# Patient Record
Sex: FEMALE | Race: WHITE | NOT HISPANIC OR LATINO | URBAN - METROPOLITAN AREA
[De-identification: names, ages, dates, MRNs, and addresses within clinical notes are randomized per-mention and may not be internally consistent; named-entity substitution may affect disease eponyms.]

---

## 2017-02-14 ENCOUNTER — EMERGENCY (EMERGENCY)
Facility: HOSPITAL | Age: 82
LOS: 1 days | Discharge: HOME | End: 2017-02-14
Attending: EMERGENCY MEDICINE | Admitting: EMERGENCY MEDICINE
Payer: MEDICARE

## 2017-02-14 VITALS
OXYGEN SATURATION: 100 % | TEMPERATURE: 97 F | HEART RATE: 88 BPM | DIASTOLIC BLOOD PRESSURE: 57 MMHG | SYSTOLIC BLOOD PRESSURE: 138 MMHG | RESPIRATION RATE: 20 BRPM

## 2017-02-14 VITALS
HEART RATE: 82 BPM | DIASTOLIC BLOOD PRESSURE: 46 MMHG | OXYGEN SATURATION: 84 % | SYSTOLIC BLOOD PRESSURE: 159 MMHG | RESPIRATION RATE: 18 BRPM

## 2017-02-14 DIAGNOSIS — Z96.643 PRESENCE OF ARTIFICIAL HIP JOINT, BILATERAL: Chronic | ICD-10-CM

## 2017-02-14 DIAGNOSIS — Z98.89 OTHER SPECIFIED POSTPROCEDURAL STATES: Chronic | ICD-10-CM

## 2017-02-14 PROCEDURE — 99284 EMERGENCY DEPT VISIT MOD MDM: CPT | Mod: 25,GC

## 2017-02-14 PROCEDURE — 93010 ELECTROCARDIOGRAM REPORT: CPT

## 2017-02-14 NOTE — PROVIDER CONTACT NOTE (OTHER) - ASSESSMENT
Pt stable to return home via ambulance.  [facility] to transport patient at 10-10:30.  daughter aware of transport time.

## 2017-02-14 NOTE — ED ADULT NURSE NOTE - OBJECTIVE STATEMENT
Received pt. in rm 2, A&Ox   , c/o generalized body pain (more so on left side) s/p slip and fall out of bed today. Daughter at bedside states pt. didn't have LOC or head trauma. Pt. non-ambulatory at baseline; h/o chronic pain. EKG performed; MD at bedside for eval. Pending xrays. Will continue to monitor. Received pt. in rm 2, A&O to baseline  , c/o generalized body pain (more so on left side) s/p slip and fall out of bed today. Daughter at bedside states pt. didn't have LOC or head trauma. Pt. non-ambulatory at baseline; h/o chronic pain. EKG performed; MD at bedside for eval. Daughter refuses xrays at this time. Will continue to monitor.

## 2017-02-14 NOTE — ED PROVIDER NOTE - PMH
Anxiety    Arthritis    Atrial fibrillation and flutter    Benign essential HTN    CKD (chronic kidney disease)    COPD (chronic obstructive pulmonary disease)    HLD (hyperlipidemia)    Obese    Pruritus    Senile dementia with depression    Symptomatic bradycardia

## 2017-02-14 NOTE — ED ADULT TRIAGE NOTE - CHIEF COMPLAINT QUOTE
Pt. brought directly to tx/ area by EMS. Witness fall by daughter at home. Pt. slid off of bed. A & O x 1. NAD noted. Daughter says that patient was c/o left leg pain. Pt. is not c/o pain at present. NAD noted. Pt. brought directly to tx/ area by EMS. Witness fall by daughter at home. Pt. slid off of bed. A & O x 1. NAD noted. Daughter says that patient was c/o left leg pain. Pt. is not c/o pain at present. NAD noted. Denies LOC or head trauma.

## 2017-02-14 NOTE — ED ADULT NURSE NOTE - CHIEF COMPLAINT QUOTE
Pt. brought directly to tx/ area by EMS. Witness fall by daughter at home. Pt. slid off of bed. A & O x 1. NAD noted. Daughter says that patient was c/o left leg pain. Pt. is not c/o pain at present. NAD noted. Denies LOC or head trauma.

## 2017-02-14 NOTE — ED PROVIDER NOTE - OBJECTIVE STATEMENT
88F PMH afib (not on anticoagulation 2/2 ICH in July 2016), dementia, s/p pacemaker placement for symptomatic bradycardia, CKD, COPD presents after unwitnessed fall at home. Pt's daughter at bedside, states heard pt fall, went into room and found patient on the ground, sitting on her left hip with her back resting up against bed. Pt was previously in bed. Typically does not attempt to get up or ambulate, so attempt to get out of bed was unusual. Pt otherwise at baseline mental status. Pt moaning when attempting to move her, but daughter states this is chronic. Has otherwise been at baseline over the past few days. A+Ox1-2 at baseline, does not ambulate.

## 2017-02-14 NOTE — ED PROVIDER NOTE - ATTENDING CONTRIBUTION TO CARE
88f pmhx severe dementia, chronic pain, htn, dlp, afib with ppm, no a/c. pt non-ambulatory at baseline. lives with daughter. pt tried to get out of bed. daughter heard her fall, found her on the floor. pt was aigtated so she called ems. pt now at baseline. pt chronically has pain wherever you press her. pt was at baseline prior. h/o b/l MANDA. exam, hr 52, other vs wnl, nad. hs and lungs normal, abdo benign. hip not dislocated on exam. I had a long d/w with daughter. she said pt is at baseline now and she would not have brought pt to ED if she was in this state. daughter declines xrays because she would not do surgery. I told her that if fx, longer course of pain but daughter said pt non-mbulatory anyways so doesn't want xrays. pulse 52. I expressed concern over ppm malfunction. daughter daid she was avi last time too and ppm interrogated and was fine. she has a cards f/u appt coming up. declined ppm interrogation in ED. wants to take pt home. will d/c.

## 2017-02-14 NOTE — ED ADULT NURSE NOTE - PSH
History of back surgery  x3, approimately 1980, 1985, 1991  Status post bilateral total hip replacement  several years ago  Status post placement of implantable loop recorder  9/15

## 2017-02-14 NOTE — ED PROVIDER NOTE - MEDICAL DECISION MAKING DETAILS
88F s/p unwitnessed fall at home. 88F s/p unwitnessed fall at home. Pt currently at baseline, per daughter would not have brought her to hospital if was at this baseline prior to calling ambulance. Daughter would not want surgical intervention if fracture present given that pt is nonambulatory at baseline, low suspicion for intracranial process, family would not want any intervention if there was process. Will d/c home per family.

## 2017-02-23 ENCOUNTER — EMERGENCY (EMERGENCY)
Facility: HOSPITAL | Age: 82
LOS: 1 days | Discharge: HOME | End: 2017-02-23
Attending: EMERGENCY MEDICINE | Admitting: EMERGENCY MEDICINE
Payer: MEDICARE

## 2017-02-23 VITALS
RESPIRATION RATE: 18 BRPM | TEMPERATURE: 98 F | HEART RATE: 61 BPM | SYSTOLIC BLOOD PRESSURE: 137 MMHG | DIASTOLIC BLOOD PRESSURE: 69 MMHG | OXYGEN SATURATION: 95 %

## 2017-02-23 VITALS — DIASTOLIC BLOOD PRESSURE: 72 MMHG | SYSTOLIC BLOOD PRESSURE: 139 MMHG | HEART RATE: 70 BPM

## 2017-02-23 DIAGNOSIS — Z98.89 OTHER SPECIFIED POSTPROCEDURAL STATES: Chronic | ICD-10-CM

## 2017-02-23 DIAGNOSIS — Z96.643 PRESENCE OF ARTIFICIAL HIP JOINT, BILATERAL: Chronic | ICD-10-CM

## 2017-02-23 LAB
ALBUMIN SERPL ELPH-MCNC: 3.1 G/DL — LOW (ref 3.3–5)
ALP SERPL-CCNC: 109 U/L — SIGNIFICANT CHANGE UP (ref 40–120)
ALT FLD-CCNC: 9 U/L — SIGNIFICANT CHANGE UP (ref 4–33)
ANISOCYTOSIS BLD QL: SIGNIFICANT CHANGE UP
APTT BLD: 32.7 SEC — SIGNIFICANT CHANGE UP (ref 27.5–37.4)
AST SERPL-CCNC: 12 U/L — SIGNIFICANT CHANGE UP (ref 4–32)
BASOPHILS # BLD AUTO: 0.02 K/UL — SIGNIFICANT CHANGE UP (ref 0–0.2)
BASOPHILS NFR BLD AUTO: 0.2 % — SIGNIFICANT CHANGE UP (ref 0–2)
BASOPHILS NFR SPEC: 1 % — SIGNIFICANT CHANGE UP (ref 0–2)
BILIRUB SERPL-MCNC: 0.8 MG/DL — SIGNIFICANT CHANGE UP (ref 0.2–1.2)
BUN SERPL-MCNC: 22 MG/DL — SIGNIFICANT CHANGE UP (ref 7–23)
CALCIUM SERPL-MCNC: 9.2 MG/DL — SIGNIFICANT CHANGE UP (ref 8.4–10.5)
CHLORIDE SERPL-SCNC: 106 MMOL/L — SIGNIFICANT CHANGE UP (ref 98–107)
CO2 SERPL-SCNC: 21 MMOL/L — LOW (ref 22–31)
CREAT SERPL-MCNC: 0.98 MG/DL — SIGNIFICANT CHANGE UP (ref 0.5–1.3)
EOSINOPHIL # BLD AUTO: 0.63 K/UL — HIGH (ref 0–0.5)
EOSINOPHIL NFR BLD AUTO: 5.9 % — SIGNIFICANT CHANGE UP (ref 0–6)
EOSINOPHIL NFR FLD: 5 % — SIGNIFICANT CHANGE UP (ref 0–6)
GLUCOSE SERPL-MCNC: 102 MG/DL — HIGH (ref 70–99)
HCT VFR BLD CALC: 40.6 % — SIGNIFICANT CHANGE UP (ref 34.5–45)
HGB BLD-MCNC: 12.8 G/DL — SIGNIFICANT CHANGE UP (ref 11.5–15.5)
HYPOCHROMIA BLD QL: SLIGHT — SIGNIFICANT CHANGE UP
IMM GRANULOCYTES NFR BLD AUTO: 1.4 % — SIGNIFICANT CHANGE UP (ref 0–1.5)
INR BLD: 1.03 — SIGNIFICANT CHANGE UP (ref 0.87–1.18)
LYMPHOCYTES # BLD AUTO: 1.86 K/UL — SIGNIFICANT CHANGE UP (ref 1–3.3)
LYMPHOCYTES # BLD AUTO: 17.4 % — SIGNIFICANT CHANGE UP (ref 13–44)
LYMPHOCYTES NFR SPEC AUTO: 17 % — SIGNIFICANT CHANGE UP (ref 13–44)
MANUAL SMEAR VERIFICATION: SIGNIFICANT CHANGE UP
MCHC RBC-ENTMCNC: 26.7 PG — LOW (ref 27–34)
MCHC RBC-ENTMCNC: 31.5 % — LOW (ref 32–36)
MCV RBC AUTO: 84.6 FL — SIGNIFICANT CHANGE UP (ref 80–100)
MONOCYTES # BLD AUTO: 0.84 K/UL — SIGNIFICANT CHANGE UP (ref 0–0.9)
MONOCYTES NFR BLD AUTO: 7.9 % — SIGNIFICANT CHANGE UP (ref 2–14)
MONOCYTES NFR BLD: 8 % — SIGNIFICANT CHANGE UP (ref 2–9)
MYELOCYTES NFR BLD: 1 % — HIGH (ref 0–0)
NEUTROPHIL AB SER-ACNC: 67 % — SIGNIFICANT CHANGE UP (ref 43–77)
NEUTROPHILS # BLD AUTO: 7.18 K/UL — SIGNIFICANT CHANGE UP (ref 1.8–7.4)
NEUTROPHILS NFR BLD AUTO: 67.2 % — SIGNIFICANT CHANGE UP (ref 43–77)
PLATELET # BLD AUTO: 331 K/UL — SIGNIFICANT CHANGE UP (ref 150–400)
PMV BLD: 10 FL — SIGNIFICANT CHANGE UP (ref 7–13)
POLYCHROMASIA BLD QL SMEAR: SLIGHT — SIGNIFICANT CHANGE UP
POTASSIUM SERPL-MCNC: 3.7 MMOL/L — SIGNIFICANT CHANGE UP (ref 3.5–5.3)
POTASSIUM SERPL-SCNC: 3.7 MMOL/L — SIGNIFICANT CHANGE UP (ref 3.5–5.3)
PROT SERPL-MCNC: 6.8 G/DL — SIGNIFICANT CHANGE UP (ref 6–8.3)
PROTHROM AB SERPL-ACNC: 11.8 SEC — SIGNIFICANT CHANGE UP (ref 10–13.1)
RBC # BLD: 4.8 M/UL — SIGNIFICANT CHANGE UP (ref 3.8–5.2)
RBC # FLD: 19.3 % — HIGH (ref 10.3–14.5)
SODIUM SERPL-SCNC: 143 MMOL/L — SIGNIFICANT CHANGE UP (ref 135–145)
VARIANT LYMPHS # BLD: 1 % — SIGNIFICANT CHANGE UP
WBC # BLD: 10.68 K/UL — HIGH (ref 3.8–10.5)
WBC # FLD AUTO: 10.68 K/UL — HIGH (ref 3.8–10.5)

## 2017-02-23 PROCEDURE — 99285 EMERGENCY DEPT VISIT HI MDM: CPT

## 2017-02-23 PROCEDURE — 73552 X-RAY EXAM OF FEMUR 2/>: CPT | Mod: 26,50

## 2017-02-23 PROCEDURE — 73523 X-RAY EXAM HIPS BI 5/> VIEWS: CPT | Mod: 26

## 2017-02-23 PROCEDURE — 73562 X-RAY EXAM OF KNEE 3: CPT | Mod: 26,50

## 2017-02-23 RX ORDER — ACETAMINOPHEN 500 MG
1000 TABLET ORAL ONCE
Qty: 0 | Refills: 0 | Status: COMPLETED | OUTPATIENT
Start: 2017-02-23 | End: 2017-02-23

## 2017-02-23 RX ADMIN — Medication 400 MILLIGRAM(S): at 14:26

## 2017-02-23 NOTE — ED ADULT NURSE NOTE - OBJECTIVE STATEMENT
Pt. received in stable condition and NAD AOX 1-2. 20G IV inserted to left AC Labs drawn and sent. Pt. c/o of left hip pain. Has had hx of hip replacements. MD evaluation in progress. Will cont. to monitor.

## 2017-02-23 NOTE — ED ADULT TRIAGE NOTE - CHIEF COMPLAINT QUOTE
pt BIBA pt seen at [facility] last week s/p fall, reports left hip pain, no deformity but bruising noted. .

## 2017-02-23 NOTE — ED ADULT NURSE NOTE - CHIEF COMPLAINT QUOTE
pt BIBA pt seen at [facility] last week s/p fall, reports left hip pain, no deformity but bruising noted. .

## 2017-02-23 NOTE — ED ADULT NURSE NOTE - PT NEEDS ASSIST
Walnuts  Avocado  Olive Oil  Freeman  Tuna  Exercise    All good ways to increase good cholesterol (HDL)  
no

## 2017-02-23 NOTE — ED PROVIDER NOTE - OBJECTIVE STATEMENT
87yo F PMHx dementia, B/L hip replacements, afib (not on AC due to previous ICH) p/w CC LE pain s/p fall. Pt. is poor historian - accompanied by daughter who explains that  10 days ago pt. fell out of bed, landed in seated position, refused Xrays on previous ED visit, today presents because pt. appears to be in severe pain with movement. Pt. non-ambulatory at baseline - she complains of pain in left hip and left knee. Denies fever, chills, CP, SOB, N/V/D, urinary symptoms. 89yo F PMHx dementia, B/L hip replacements, afib (not on AC due to previous ICH) p/w CC LE pain s/p fall. Pt. is poor historian - accompanied by daughter who explains that  10 days ago pt. fell out of bed, landed in seated position, refused Xrays on previous ED visit, today presents because pt. appears to be in severe pain with movement. Pt. non-ambulatory at baseline - she complains of pain in left hip and left knee. Denies fever, chills, CP, SOB, N/V/D, urinary symptoms.    Attendinyo female presents with hip adria with movement.  Pt was seen with a fall recently, but refused xrays.  pt would not want surgery.

## 2017-03-30 ENCOUNTER — APPOINTMENT (OUTPATIENT)
Dept: ELECTROPHYSIOLOGY | Facility: CLINIC | Age: 82
End: 2017-03-30

## 2017-05-22 ENCOUNTER — APPOINTMENT (OUTPATIENT)
Dept: ELECTROPHYSIOLOGY | Facility: CLINIC | Age: 82
End: 2017-05-22

## 2017-05-29 ENCOUNTER — INPATIENT (INPATIENT)
Facility: HOSPITAL | Age: 82
LOS: 1 days | Discharge: HOME | End: 2017-05-31
Attending: HOSPITALIST | Admitting: HOSPITALIST
Payer: MEDICARE

## 2017-05-29 VITALS
OXYGEN SATURATION: 95 % | SYSTOLIC BLOOD PRESSURE: 158 MMHG | DIASTOLIC BLOOD PRESSURE: 106 MMHG | TEMPERATURE: 97 F | RESPIRATION RATE: 16 BRPM | HEART RATE: 55 BPM

## 2017-05-29 DIAGNOSIS — N17.9 ACUTE KIDNEY FAILURE, UNSPECIFIED: ICD-10-CM

## 2017-05-29 DIAGNOSIS — K52.9 NONINFECTIVE GASTROENTERITIS AND COLITIS, UNSPECIFIED: ICD-10-CM

## 2017-05-29 DIAGNOSIS — I10 ESSENTIAL (PRIMARY) HYPERTENSION: ICD-10-CM

## 2017-05-29 DIAGNOSIS — R00.1 BRADYCARDIA, UNSPECIFIED: ICD-10-CM

## 2017-05-29 DIAGNOSIS — Z96.643 PRESENCE OF ARTIFICIAL HIP JOINT, BILATERAL: Chronic | ICD-10-CM

## 2017-05-29 DIAGNOSIS — R62.7 ADULT FAILURE TO THRIVE: ICD-10-CM

## 2017-05-29 DIAGNOSIS — Z29.9 ENCOUNTER FOR PROPHYLACTIC MEASURES, UNSPECIFIED: ICD-10-CM

## 2017-05-29 DIAGNOSIS — Z98.89 OTHER SPECIFIED POSTPROCEDURAL STATES: Chronic | ICD-10-CM

## 2017-05-29 DIAGNOSIS — N39.0 URINARY TRACT INFECTION, SITE NOT SPECIFIED: ICD-10-CM

## 2017-05-29 DIAGNOSIS — I24.9 ACUTE ISCHEMIC HEART DISEASE, UNSPECIFIED: ICD-10-CM

## 2017-05-29 LAB
ALBUMIN SERPL ELPH-MCNC: 3.9 G/DL — SIGNIFICANT CHANGE UP (ref 3.3–5)
ALP SERPL-CCNC: 101 U/L — SIGNIFICANT CHANGE UP (ref 40–120)
ALT FLD-CCNC: 12 U/L — SIGNIFICANT CHANGE UP (ref 4–33)
APPEARANCE UR: CLEAR — SIGNIFICANT CHANGE UP
AST SERPL-CCNC: 23 U/L — SIGNIFICANT CHANGE UP (ref 4–32)
BACTERIA # UR AUTO: SIGNIFICANT CHANGE UP
BASE EXCESS BLDV CALC-SCNC: 0.7 MMOL/L — SIGNIFICANT CHANGE UP
BASOPHILS # BLD AUTO: 0.07 K/UL — SIGNIFICANT CHANGE UP (ref 0–0.2)
BASOPHILS NFR BLD AUTO: 0.6 % — SIGNIFICANT CHANGE UP (ref 0–2)
BILIRUB SERPL-MCNC: 0.7 MG/DL — SIGNIFICANT CHANGE UP (ref 0.2–1.2)
BILIRUB UR-MCNC: NEGATIVE — SIGNIFICANT CHANGE UP
BLOOD GAS VENOUS - CREATININE: 1.36 MG/DL — HIGH (ref 0.5–1.3)
BLOOD UR QL VISUAL: HIGH
BUN SERPL-MCNC: 46 MG/DL — HIGH (ref 7–23)
CALCIUM SERPL-MCNC: 10 MG/DL — SIGNIFICANT CHANGE UP (ref 8.4–10.5)
CHLORIDE BLDV-SCNC: 112 MMOL/L — HIGH (ref 96–108)
CHLORIDE SERPL-SCNC: 105 MMOL/L — SIGNIFICANT CHANGE UP (ref 98–107)
CK MB BLD-MCNC: 5.33 NG/ML — HIGH (ref 1–4.7)
CK MB BLD-MCNC: 5.49 NG/ML — HIGH (ref 1–4.7)
CK MB BLD-MCNC: SIGNIFICANT CHANGE UP (ref 0–2.5)
CK SERPL-CCNC: 59 U/L — SIGNIFICANT CHANGE UP (ref 25–170)
CK SERPL-CCNC: 59 U/L — SIGNIFICANT CHANGE UP (ref 25–170)
CO2 SERPL-SCNC: 20 MMOL/L — LOW (ref 22–31)
COLOR SPEC: YELLOW — SIGNIFICANT CHANGE UP
CREAT SERPL-MCNC: 1.38 MG/DL — HIGH (ref 0.5–1.3)
DIGOXIN SERPL-MCNC: 1.2 NG/ML — SIGNIFICANT CHANGE UP (ref 0.8–2)
EOSINOPHIL # BLD AUTO: 0.18 K/UL — SIGNIFICANT CHANGE UP (ref 0–0.5)
EOSINOPHIL NFR BLD AUTO: 1.6 % — SIGNIFICANT CHANGE UP (ref 0–6)
GAS PNL BLDV: 138 MMOL/L — SIGNIFICANT CHANGE UP (ref 136–146)
GLUCOSE BLDV-MCNC: 140 — HIGH (ref 70–99)
GLUCOSE SERPL-MCNC: 137 MG/DL — HIGH (ref 70–99)
GLUCOSE UR-MCNC: NEGATIVE — SIGNIFICANT CHANGE UP
HCO3 BLDV-SCNC: 23 MMOL/L — SIGNIFICANT CHANGE UP (ref 20–27)
HCT VFR BLD CALC: 49 % — HIGH (ref 34.5–45)
HCT VFR BLDV CALC: 48.1 % — HIGH (ref 34.5–45)
HGB BLD-MCNC: 15.7 G/DL — HIGH (ref 11.5–15.5)
HGB BLDV-MCNC: 15.7 G/DL — HIGH (ref 11.5–15.5)
IMM GRANULOCYTES NFR BLD AUTO: 0.3 % — SIGNIFICANT CHANGE UP (ref 0–1.5)
KETONES UR-MCNC: NEGATIVE — SIGNIFICANT CHANGE UP
LACTATE BLDV-MCNC: 1.9 MMOL/L — SIGNIFICANT CHANGE UP (ref 0.5–2)
LEUKOCYTE ESTERASE UR-ACNC: HIGH
LYMPHOCYTES # BLD AUTO: 2.44 K/UL — SIGNIFICANT CHANGE UP (ref 1–3.3)
LYMPHOCYTES # BLD AUTO: 21 % — SIGNIFICANT CHANGE UP (ref 13–44)
MCHC RBC-ENTMCNC: 28.4 PG — SIGNIFICANT CHANGE UP (ref 27–34)
MCHC RBC-ENTMCNC: 32 % — SIGNIFICANT CHANGE UP (ref 32–36)
MCV RBC AUTO: 88.6 FL — SIGNIFICANT CHANGE UP (ref 80–100)
MONOCYTES # BLD AUTO: 0.75 K/UL — SIGNIFICANT CHANGE UP (ref 0–0.9)
MONOCYTES NFR BLD AUTO: 6.5 % — SIGNIFICANT CHANGE UP (ref 2–14)
MUCOUS THREADS # UR AUTO: SIGNIFICANT CHANGE UP
NEUTROPHILS # BLD AUTO: 8.14 K/UL — HIGH (ref 1.8–7.4)
NEUTROPHILS NFR BLD AUTO: 70 % — SIGNIFICANT CHANGE UP (ref 43–77)
NITRITE UR-MCNC: POSITIVE — HIGH
PCO2 BLDV: 49 MMHG — SIGNIFICANT CHANGE UP (ref 41–51)
PH BLDV: 7.34 PH — SIGNIFICANT CHANGE UP (ref 7.32–7.43)
PH UR: 6 — SIGNIFICANT CHANGE UP (ref 4.6–8)
PLATELET # BLD AUTO: 275 K/UL — SIGNIFICANT CHANGE UP (ref 150–400)
PMV BLD: 11.3 FL — SIGNIFICANT CHANGE UP (ref 7–13)
PO2 BLDV: 29 MMHG — LOW (ref 35–40)
POTASSIUM BLDV-SCNC: 4.2 MMOL/L — SIGNIFICANT CHANGE UP (ref 3.4–4.5)
POTASSIUM SERPL-MCNC: 4.4 MMOL/L — SIGNIFICANT CHANGE UP (ref 3.5–5.3)
POTASSIUM SERPL-SCNC: 4.4 MMOL/L — SIGNIFICANT CHANGE UP (ref 3.5–5.3)
PROT SERPL-MCNC: 8.6 G/DL — HIGH (ref 6–8.3)
PROT UR-MCNC: 100 — HIGH
RBC # BLD: 5.53 M/UL — HIGH (ref 3.8–5.2)
RBC # FLD: 15.9 % — HIGH (ref 10.3–14.5)
RBC CASTS # UR COMP ASSIST: SIGNIFICANT CHANGE UP (ref 0–?)
SAO2 % BLDV: 41 % — LOW (ref 60–85)
SODIUM SERPL-SCNC: 143 MMOL/L — SIGNIFICANT CHANGE UP (ref 135–145)
SP GR SPEC: 1.02 — SIGNIFICANT CHANGE UP (ref 1–1.03)
SQUAMOUS # UR AUTO: SIGNIFICANT CHANGE UP
TROPONIN T SERPL-MCNC: < 0.06 NG/ML — SIGNIFICANT CHANGE UP (ref 0–0.06)
TROPONIN T SERPL-MCNC: < 0.06 NG/ML — SIGNIFICANT CHANGE UP (ref 0–0.06)
UROBILINOGEN FLD QL: NORMAL E.U. — SIGNIFICANT CHANGE UP (ref 0.1–0.2)
WBC # BLD: 11.61 K/UL — HIGH (ref 3.8–10.5)
WBC # FLD AUTO: 11.61 K/UL — HIGH (ref 3.8–10.5)
WBC UR QL: SIGNIFICANT CHANGE UP (ref 0–?)

## 2017-05-29 PROCEDURE — 99223 1ST HOSP IP/OBS HIGH 75: CPT | Mod: GC

## 2017-05-29 PROCEDURE — 70450 CT HEAD/BRAIN W/O DYE: CPT | Mod: 26

## 2017-05-29 PROCEDURE — 71010: CPT | Mod: 26

## 2017-05-29 PROCEDURE — 99223 1ST HOSP IP/OBS HIGH 75: CPT

## 2017-05-29 PROCEDURE — 74176 CT ABD & PELVIS W/O CONTRAST: CPT | Mod: 26

## 2017-05-29 RX ORDER — DOCUSATE SODIUM 100 MG
100 CAPSULE ORAL THREE TIMES A DAY
Qty: 0 | Refills: 0 | Status: DISCONTINUED | OUTPATIENT
Start: 2017-05-29 | End: 2017-05-31

## 2017-05-29 RX ORDER — CEFTRIAXONE 500 MG/1
1 INJECTION, POWDER, FOR SOLUTION INTRAMUSCULAR; INTRAVENOUS EVERY 24 HOURS
Qty: 0 | Refills: 0 | Status: DISCONTINUED | OUTPATIENT
Start: 2017-05-30 | End: 2017-05-31

## 2017-05-29 RX ORDER — HEPARIN SODIUM 5000 [USP'U]/ML
5000 INJECTION INTRAVENOUS; SUBCUTANEOUS EVERY 12 HOURS
Qty: 0 | Refills: 0 | Status: DISCONTINUED | OUTPATIENT
Start: 2017-05-29 | End: 2017-05-31

## 2017-05-29 RX ORDER — SODIUM CHLORIDE 9 MG/ML
1000 INJECTION INTRAMUSCULAR; INTRAVENOUS; SUBCUTANEOUS
Qty: 0 | Refills: 0 | Status: DISCONTINUED | OUTPATIENT
Start: 2017-05-29 | End: 2017-05-30

## 2017-05-29 RX ORDER — POLYETHYLENE GLYCOL 3350 17 G/17G
17 POWDER, FOR SOLUTION ORAL DAILY
Qty: 0 | Refills: 0 | Status: DISCONTINUED | OUTPATIENT
Start: 2017-05-29 | End: 2017-05-31

## 2017-05-29 RX ORDER — SODIUM CHLORIDE 9 MG/ML
1000 INJECTION INTRAMUSCULAR; INTRAVENOUS; SUBCUTANEOUS ONCE
Qty: 0 | Refills: 0 | Status: COMPLETED | OUTPATIENT
Start: 2017-05-29 | End: 2017-05-29

## 2017-05-29 RX ORDER — ASPIRIN/CALCIUM CARB/MAGNESIUM 324 MG
81 TABLET ORAL DAILY
Qty: 0 | Refills: 0 | Status: DISCONTINUED | OUTPATIENT
Start: 2017-05-29 | End: 2017-05-31

## 2017-05-29 RX ORDER — CLONAZEPAM 1 MG
0.5 TABLET ORAL AT BEDTIME
Qty: 0 | Refills: 0 | Status: DISCONTINUED | OUTPATIENT
Start: 2017-05-29 | End: 2017-05-31

## 2017-05-29 RX ORDER — LEVOTHYROXINE SODIUM 125 MCG
25 TABLET ORAL DAILY
Qty: 0 | Refills: 0 | Status: DISCONTINUED | OUTPATIENT
Start: 2017-05-29 | End: 2017-05-31

## 2017-05-29 RX ORDER — ASPIRIN/CALCIUM CARB/MAGNESIUM 324 MG
162 TABLET ORAL ONCE
Qty: 0 | Refills: 0 | Status: COMPLETED | OUTPATIENT
Start: 2017-05-29 | End: 2017-05-29

## 2017-05-29 RX ORDER — CEFTRIAXONE 500 MG/1
1 INJECTION, POWDER, FOR SOLUTION INTRAMUSCULAR; INTRAVENOUS ONCE
Qty: 0 | Refills: 0 | Status: COMPLETED | OUTPATIENT
Start: 2017-05-29 | End: 2017-05-29

## 2017-05-29 RX ORDER — SENNA PLUS 8.6 MG/1
2 TABLET ORAL AT BEDTIME
Qty: 0 | Refills: 0 | Status: DISCONTINUED | OUTPATIENT
Start: 2017-05-29 | End: 2017-05-31

## 2017-05-29 RX ORDER — DIGOXIN 250 MCG
0.12 TABLET ORAL DAILY
Qty: 0 | Refills: 0 | Status: DISCONTINUED | OUTPATIENT
Start: 2017-05-29 | End: 2017-05-31

## 2017-05-29 RX ORDER — ATORVASTATIN CALCIUM 80 MG/1
80 TABLET, FILM COATED ORAL ONCE
Qty: 0 | Refills: 0 | Status: COMPLETED | OUTPATIENT
Start: 2017-05-29 | End: 2017-05-29

## 2017-05-29 RX ADMIN — Medication 0.12 MILLIGRAM(S): at 18:58

## 2017-05-29 RX ADMIN — Medication 100 MILLIGRAM(S): at 22:24

## 2017-05-29 RX ADMIN — SENNA PLUS 2 TABLET(S): 8.6 TABLET ORAL at 22:24

## 2017-05-29 RX ADMIN — Medication 25 MICROGRAM(S): at 19:02

## 2017-05-29 RX ADMIN — Medication 0.5 MILLIGRAM(S): at 22:24

## 2017-05-29 RX ADMIN — POLYETHYLENE GLYCOL 3350 17 GRAM(S): 17 POWDER, FOR SOLUTION ORAL at 22:24

## 2017-05-29 RX ADMIN — HEPARIN SODIUM 5000 UNIT(S): 5000 INJECTION INTRAVENOUS; SUBCUTANEOUS at 18:59

## 2017-05-29 RX ADMIN — SODIUM CHLORIDE 75 MILLILITER(S): 9 INJECTION INTRAMUSCULAR; INTRAVENOUS; SUBCUTANEOUS at 22:25

## 2017-05-29 RX ADMIN — SODIUM CHLORIDE 1000 MILLILITER(S): 9 INJECTION INTRAMUSCULAR; INTRAVENOUS; SUBCUTANEOUS at 13:51

## 2017-05-29 RX ADMIN — Medication 162 MILLIGRAM(S): at 15:06

## 2017-05-29 RX ADMIN — SODIUM CHLORIDE 75 MILLILITER(S): 9 INJECTION INTRAMUSCULAR; INTRAVENOUS; SUBCUTANEOUS at 20:00

## 2017-05-29 RX ADMIN — Medication 81 MILLIGRAM(S): at 18:58

## 2017-05-29 RX ADMIN — CEFTRIAXONE 100 GRAM(S): 500 INJECTION, POWDER, FOR SOLUTION INTRAMUSCULAR; INTRAVENOUS at 16:33

## 2017-05-29 NOTE — ED PROVIDER NOTE - CARE PLAN
Principal Discharge DX:	ACS (acute coronary syndrome)  Secondary Diagnosis:	Nausea and vomiting, intractability of vomiting not specified, unspecified vomiting type

## 2017-05-29 NOTE — ED PROVIDER NOTE - OBJECTIVE STATEMENT
87yo F h/o dementia, B/L hip replacements, afib (not on AC due to previous ICH) p/w decreased appetite since ICH, but worse today. As per daughter, appetite has deteriorated over the last three day. Daughter tried to give boost, which pt tolerated and then patient vomited up everything. Denies any pain. otherwise as per daughter pt at baseline.

## 2017-05-29 NOTE — H&P ADULT - PROBLEM SELECTOR PLAN 6
Avoid beta blockers given hx symptomatic bradycardia.  Avoid ACEi at this time given ROMIE on CKD  Will give hydralazine and monitor

## 2017-05-29 NOTE — ED ADULT NURSE NOTE - OBJECTIVE STATEMENT
Alert, awake, minimally verbal.  Calm, cooperative.   Abdomen soft, non-distended, no tenderness.   No active vomiting in ED.  Vitals noted, bp elevated.  IV accessed. Labs sent.  MD Montanez at the bedside.

## 2017-05-29 NOTE — H&P ADULT - NSHPPHYSICALEXAM_GEN_ALL_CORE
General: NAD  Neurology: A&Ox1 (name)  ENT:  Mucosa moist  Neck:  Supple, no sinuses or palpable masses  Lymphatic:  Patient w/swelling at ankles and knees. No palpable cervical, supraclavicular, axillary or inguinal adenopathy.  Respiratory: CTA B/L  CV: RRR, no murmur  Abdominal: Soft, NT, ND no palpable mass  MSK: L ankle edema, r knee edema

## 2017-05-29 NOTE — ED PROVIDER NOTE - CRITICAL CARE PROVIDED
additional history taking/interpretation of diagnostic studies/consultation with other physicians/conducted a detailed discussion of DNR status/documentation/direct patient care (not related to procedure)/consult w/ pt's family directly relating to pts condition

## 2017-05-29 NOTE — ED ADULT NURSE NOTE - CHIEF COMPLAINT
The patient is a 88y Female brought in by daughter, c/o pt is not eating since yesterday, one episode of vomiting.  As per the daughter, pt was eating normal food.  History of dementia, cva with hemipalesic, pacemaker.  Pt did not take home meds since yesterday.

## 2017-05-29 NOTE — H&P ADULT - PROBLEM SELECTOR PLAN 1
- Likely 2/2 AMS worsened by metabolic causes (infection)  - Speech and swallow to assess swallowing - Likely 2/2 UTI

## 2017-05-29 NOTE — CONSULT NOTE ADULT - SUBJECTIVE AND OBJECTIVE BOX
Date of Admission:  5/29/17  CHIEF COMPLAINT:  decreased appetite in nursing home    HISTORY OF PRESENT ILLNESS:    89yo F h/o dementia, B/L hip replacements, afib (not on AC due to previous ICH) p/w decreased appetite since ICH, but worse today. As per daughter, appetite has deteriorated over the last three day. Daughter tried to give boost, which pt tolerated and then patient vomited up everything. Denies any pain. otherwise as per daughter pt at baseline.    Allergies  latex (Flushing (Skin))  No Known Drug Allergies  Intolerances  beta blockers (Hypotension)  	    MEDICATIONS:   Clonazepam    PAST MEDICAL & SURGICAL HISTORY:  Symptomatic bradycardia  Obese  Pruritus  Anxiety  Atrial fibrillation and flutter  HLD (hyperlipidemia)  CKD (chronic kidney disease)  High cholesterol  COPD (chronic obstructive pulmonary disease)  Arthritis  Benign essential HTN  Senile dementia with depression  Status post bilateral total hip replacement: several years ago  Status post placement of implantable loop recorder: 9/15  History of back surgery: x3, approimately 1980, 1985, 1991  H/O back injury  Hip problem: surgery b/l      FAMILY HISTORY:  No pertinent family history in first degree relatives  No significant family history      SOCIAL HISTORY:    [x ] Non-smoker  [ ] Smoker  [ ] Alcohol      REVIEW OF SYSTEMS:  General: decreased appetite  Lungs: + wheeze  Cardiac: negative for CP or palpitations  Abd: +nausea and +vomitting  Extremities: negative for edema    PHYSICAL EXAM:  T(C): 35.9, Max: 35.9 (05-29 @ 12:43)  HR: 54 (54 - 55)  BP: 124/82 (124/82 - 158/106)  RR: 15 (15 - 16)  SpO2: 98% (95% - 98%)    Appearance: 	  HEENT:   Normal oral mucosa, PERRL, EOMI	  Lymphatic: No lymphadenopathy  Cardiovascular: Normal S1 S2, No JVD, No murmurs, No edema  Respiratory: decreased breath sounds at bases +wheeze b/l  Psychiatry: baseline dementia Mood & affect appropriate  Gastrointestinal:  Umbilicus red with pus, Soft, Non-tender, + BS	  Skin: No rashes, No ecchymoses, No cyanosis	  Neurologic: Non-focal  Extremities: + 2 pedal edema bilaterally  Vascular: Peripheral pulses palpable 2+ bilaterally        LABS:	 	    CBC Full  -  ( 29 May 2017 13:00 )  WBC Count : 11.61 K/uL  Hemoglobin : 15.7 g/dL  Hematocrit : 49.0 %  Platelet Count - Automated : 275 K/uL  Mean Cell Volume : 88.6 fL  Mean Cell Hemoglobin : 28.4 pg  Mean Cell Hemoglobin Concentration : 32.0 %  Auto Neutrophil # : 8.14 K/uL  Auto Lymphocyte # : 2.44 K/uL  Auto Monocyte # : 0.75 K/uL  Auto Eosinophil # : 0.18 K/uL  Auto Basophil # : 0.07 K/uL  Auto Neutrophil % : 70.0 %  Auto Lymphocyte % : 21.0 %  Auto Monocyte % : 6.5 %  Auto Eosinophil % : 1.6 %  Auto Basophil % : 0.6 %    05-29    143  |  105  |  46<H>  ----------------------------<  137<H>  4.4   |  20<L>  |  1.38<H>    Ca    10.0      29 May 2017 13:00    TPro  8.6<H>  /  Alb  3.9  /  TBili  0.7  /  DBili  x   /  AST  23  /  ALT  12  /  AlkPhos  101  05-29    CARDIAC MARKERS:  CKMB: 5.49 ng/mL (05-29 @ 13:00)    CKMB Relative Index: Test not performed (05-29 @ 13:00)      TELEMETRY: junctional bradycardia	    ECG:  	Junctional bradycardia with ST depressions in V2, slight elevation in AVR  RADIOLOGY:  OTHER: 	    PREVIOUS DIAGNOSTIC TESTING:    Echocardiogram:EF (Visual Estimate): 60 %  1. Normal left ventricular internal dimensions and wall  thicknesses.  2. Normal left ventricular systolic function. No segmental  wall motion abnormalities.  3. Normal right ventricular size and function.

## 2017-05-29 NOTE — H&P ADULT - PROBLEM SELECTOR PLAN 5
- Possible colitis on CT... - Possible colitis on CT. Benign exam.  - Bowel regimen for now - Possible stercoral colitis on CT. Benign exam.  - Bowel regimen for now, and monitor

## 2017-05-29 NOTE — ED PROVIDER NOTE - ATTENDING CONTRIBUTION TO CARE
DR. STEVENSON, ATTENDING MD-  I performed a face to face bedside interview with patient regarding history of present illness, review of symptoms and past medical history. I completed an independent physical exam.  I have discussed patient's plan of care with the resident.   Documentation as above in the note.   HPI: 87yo F h/o dementia, B/L hip replacements, afib (not on AC due to previous ICH 7/2016), anxiety, arthritis, CKD, COPD, HLP, from home presents with loss of appetite but worse today. Had one bout nb/nb vomiting this AM. History obtained from daughter who is also HCP. Pt full code per daughter.   EXAM: no acute distress, AOx1. Heart Bradycardia without M/R/G. Abd tenderness to palpation LUQ.   MDM: 87 yo F from home with multiple medical problems that presents with nausea but found to have abnormal EKG showing possible STEMI with elevation in aVR and depressions in V3-4-5-6. STEMI called and Dr Davies saw EKG, aware. Pt previous ICH last year, not candidate for cardiac cath lab. Will start ASA as pt takes at home safely and Statin but hold plavix and heparin. Will admit to CCU. Pt is full code per daughter, not ready to commit mother to DNR/DNI.

## 2017-05-29 NOTE — H&P ADULT - ASSESSMENT
88F PMH dementia, B/L hip replacements, Afib (not on AC due to previous ICH), symptomatic bradycardia (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), COPD, CKD p/w failure to thrive and junctional bradycardia 88F PMH dementia, B/L hip replacements, Afib (not on AC due to previous ICH), symptomatic bradycardia (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), COPD, CKD p/w failure to thrive and junctional bradycardia, found to have UTI and possible stercoral colitis

## 2017-05-29 NOTE — ED PROVIDER NOTE - PROGRESS NOTE DETAILS
Discussed with cardiology/CCU - admit to medicine on tele due to EKG changes and UTI - interventionalist does not think this is a STEMI and pt is not a candidate for cath or anti-coagulation

## 2017-05-29 NOTE — ED PROVIDER NOTE - DIAGNOSIS COUNSELING, MDM
conducted a detailed discussion... I had a detailed discussion with the patient and guardian regarding the historical points, exam findings, and any diagnostic results supporting the admit diagnosis.

## 2017-05-29 NOTE — H&P ADULT - NSHPREVIEWOFSYSTEMS_GEN_ALL_CORE
Limited by patient's mental status    Review of Systems:   CONSTITUTIONAL: No fever, endorses chills  RESPIRATORY: No cough, wheezing, SOB  CARDIOVASCULAR: No CP, palpitations, dizziness  GASTROINTESTINAL: No abdominal or epigastric pain. No N/V. No diarrhea.  GENITOURINARY: No dysuria, frequency  NEUROLOGICAL: No headaches

## 2017-05-29 NOTE — H&P ADULT - HISTORY OF PRESENT ILLNESS
88F PMH dementia, B/L hip replacements, Afib (not on AC due to previous ICH), symptomatic bradycardia (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), COPD, CKD p/w decreased appetite since ICH, but worse today. As per daughter, appetite has deteriorated over the last three day. Daughter tried to give boost, which pt tolerated and then patient vomited up everything. Denies any pain. otherwise as per daughter pt at baseline. 88F PMH dementia, B/L hip replacements, Afib (not on AC due to previous ICH), symptomatic bradycardia (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), COPD, CKD p/w decreased appetite since ICH, but worse today. As per daughter, appetite has deteriorated over the last three day. Daughter tried to give boost, which pt tolerated and then patient vomited up everything. Denies any pain. otherwise as per daughter pt at baseline.    wheelchair at baseline  Raghavendra lift  Daughter and grand daughter 88F PMH dementia, B/L hip replacements, Afib (not on AC due to previous ICH), symptomatic bradycardia (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), COPD, CKD p/w decreased appetite since ICH, but worse today. As per daughter, appetite has deteriorated over the last three day. Daughter tried to give boost, which pt tolerated and then patient vomited up everything. Patient is A&O x 1. She is in a wheel chair at baseline and her family uses a neel lift to get her out of bed. Denies any pain. otherwise as per daughter pt at baseline.    Patient's daughter states that she has not been eating over the past 2 days. She has been finding food or pills in her mouth not fully eaten. Patient denies any CP, abdominal pain, SOB, dysuria, frequency, dizziness, fevers. Patient does have chills.  Patient does not have any HCP, she is taken care of by her daughter and grand daughter. Daughter states that she would be decision maker.   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     ED Vitals: T 96.7, HR 55, /106, RR 16, SpO2 95% on RA.  Initial Labs: WBC 11.61, Cr 1.38, Trop < .06. Lactate 1.9. Dig 1.2. UA positive nitrite, moderate LE, moderate bacteremia.     Patient was given 88F PMH dementia, B/L hip replacements, Afib (not on AC due to previous ICH), symptomatic bradycardia (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), COPD, CKD p/w decreased appetite since ICH, but worse today. As per daughter, appetite has deteriorated over the last three day. Daughter tried to give boost, which pt tolerated and then patient vomited up everything. Patient is A&O x 1. She is in a wheel chair at baseline and her family uses a neel lift to get her out of bed. Denies any pain. otherwise as per daughter pt at baseline.    Patient's daughter states that she has not been eating over the past 2 days. She has been finding food or pills in her mouth not fully eaten. Patient denies any CP, abdominal pain, SOB, dysuria, frequency, dizziness, fevers. Patient does have chills.  Patient does not have any HCP, she is taken care of by her daughter and grand daughter. Daughter states that she would be decision maker.   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     ED Vitals: T 96.7, HR 55, /106, RR 16, SpO2 95% on RA.  Initial Labs: WBC 11.61, Cr 1.38, Trop < .06. Lactate 1.9. Dig 1.2. UA positive nitrite, moderate LE, moderate bacteremia.   Initial imaging: CTH - No mass effect, hemorrhage or evidence of acute intracranial pathology. CT A/P Colonic diverticulosis without diverticulitis. Stool-filled rectum with mild wall thickening. Possible stercoral colitis.    Patient was given 1L bolus NS and cetriaxone 88F PMH dementia, B/L hip replacements, Afib (not on AC due to previous ICH), symptomatic bradycardia (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), COPD, CKD p/w worsened decrease in appetite over past day. As per daughter, appetite has deteriorated over the last three day. Daughter tried to give boost, which pt tolerated and then patient vomited up everything. Patient is A&O x 1. She is in a wheel chair at baseline and her family uses a neel lift to get her out of bed. Denies any pain. otherwise as per daughter pt at baseline.    Patient's daughter states that she has not been eating over the past 2 days. She has been finding food or pills in her mouth not fully eaten. Patient denies any CP, abdominal pain, SOB, dysuria, frequency, dizziness, fevers. Patient does have chills.  Patient does not have any HCP, she is taken care of by her daughter and grand daughter. Daughter states that she would be decision maker.   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     ED Vitals: T 96.7, HR 55, /106, RR 16, SpO2 95% on RA.  Initial Labs: WBC 11.61, Cr 1.38, Trop < .06. Lactate 1.9. Dig 1.2. UA positive nitrite, moderate LE, moderate bacteremia.   Initial imaging: CTH - No mass effect, hemorrhage or evidence of acute intracranial pathology. CT A/P Colonic diverticulosis without diverticulitis. Stool-filled rectum with mild wall thickening. Possible stercoral colitis.    Patient was given 1L bolus NS and cetriaxone 88F PMH dementia, B/L hip replacements, Afib (not on AC due to previous ICH), symptomatic bradycardia (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), COPD, CKD p/w worsened decrease in appetite over past day. As per daughter, appetite has deteriorated over the last three day. Daughter tried to give boost, which pt tolerated and then patient vomited up everything. Patient is A&O x 1. She is in a wheel chair at baseline and her family uses a neel lift to get her out of bed. Denies any pain. otherwise as per daughter pt at baseline.    Patient's daughter states that she has not been eating over the past 2 days. She has been finding food or pills in her mouth not fully eaten. Patient denies any CP, abdominal pain, SOB, dysuria, frequency, dizziness, fevers. Patient does have chills.  Patient does not have any HCP, she is taken care of by her daughter and grand daughter. Daughter states that she would be decision maker.   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     ED Vitals: T 96.7, HR 55, /106, RR 16, SpO2 95% on RA.  Initial Labs: WBC 11.61, Cr 1.38, Trop < .06. Lactate 1.9. Dig 1.2. UA positive nitrite, moderate LE, moderate bacteremia.   Initial imaging: CTH - No mass effect, hemorrhage or evidence of acute intracranial pathology. CT A/P Colonic diverticulosis without diverticulitis. Stool-filled rectum with mild wall thickening. Possible stercoral colitis.    Patient was given 1L bolus NS and ceftriaxone

## 2017-05-29 NOTE — CONSULT NOTE ADULT - ASSESSMENT
87yo F h/o dementia, B/L hip replacements, afib (not on AC due to previous ICH) p/w decreased appetite since ICH, but worse today, unlikely an acute MI given no chest pain.  -EKG does not meet criteria for ACUTE MI, no ST elevations. Cardiac Enzyme negative x 1. No chest pain. Would not treat for ACS at this time, especially given recent history of ICH.  Can trend enzymes,. Would recommend a medtronic PPM device interrogation in the am to further evaluate junctional bradycardia.  last interrogation was with Dr. Canales in December 2016. DDD  . Plan discussed with Dr. Briones.

## 2017-05-29 NOTE — H&P ADULT - PROBLEM SELECTOR PLAN 4
- Bladder scan,   - Urine lytes  - Renal ultrasound Likely pre-renal  - will give gentle hydration and f/u in am

## 2017-05-29 NOTE — H&P ADULT - NSHPLABSRESULTS_GEN_ALL_CORE
WBC 11.61, Cr 1.38, Trop < .06. Lactate 1.9. Dig 1.2. UA positive nitrite, moderate LE, moderate bacteremia.   CTH - No mass effect, hemorrhage or evidence of acute intracranial pathology. CT A/P Colonic diverticulosis without diverticulitis. Stool-filled rectum with mild wall thickening. Possible stercoral colitis. Reviewed: WBC 11.61, Cr 1.38, Trop < .06. Lactate 1.9. Dig 1.2. UA positive nitrite, moderate LE, moderate bacteremia.   CTH reviewed - No mass effect, hemorrhage or evidence of acute intracranial pathology.   CT A/P reviewed Colonic diverticulosis without diverticulitis. Stool-filled rectum with mild wall thickening. Possible stercoral colitis.

## 2017-05-30 DIAGNOSIS — G93.41 METABOLIC ENCEPHALOPATHY: ICD-10-CM

## 2017-05-30 DIAGNOSIS — K56.41 FECAL IMPACTION: ICD-10-CM

## 2017-05-30 LAB
CK MB BLD-MCNC: 5.17 NG/ML — HIGH (ref 1–4.7)
CK SERPL-CCNC: 56 U/L — SIGNIFICANT CHANGE UP (ref 25–170)
SPECIMEN SOURCE: SIGNIFICANT CHANGE UP
TROPONIN T SERPL-MCNC: < 0.06 NG/ML — SIGNIFICANT CHANGE UP (ref 0–0.06)

## 2017-05-30 PROCEDURE — 99233 SBSQ HOSP IP/OBS HIGH 50: CPT | Mod: GC

## 2017-05-30 PROCEDURE — 93280 PM DEVICE PROGR EVAL DUAL: CPT | Mod: 26,GC

## 2017-05-30 RX ADMIN — POLYETHYLENE GLYCOL 3350 17 GRAM(S): 17 POWDER, FOR SOLUTION ORAL at 12:06

## 2017-05-30 RX ADMIN — SENNA PLUS 2 TABLET(S): 8.6 TABLET ORAL at 21:20

## 2017-05-30 RX ADMIN — Medication 25 MICROGRAM(S): at 05:09

## 2017-05-30 RX ADMIN — Medication 81 MILLIGRAM(S): at 12:07

## 2017-05-30 RX ADMIN — Medication 0.12 MILLIGRAM(S): at 05:10

## 2017-05-30 RX ADMIN — CEFTRIAXONE 100 GRAM(S): 500 INJECTION, POWDER, FOR SOLUTION INTRAMUSCULAR; INTRAVENOUS at 18:01

## 2017-05-30 RX ADMIN — Medication 0.5 MILLIGRAM(S): at 21:20

## 2017-05-30 RX ADMIN — HEPARIN SODIUM 5000 UNIT(S): 5000 INJECTION INTRAVENOUS; SUBCUTANEOUS at 05:10

## 2017-05-30 RX ADMIN — HEPARIN SODIUM 5000 UNIT(S): 5000 INJECTION INTRAVENOUS; SUBCUTANEOUS at 18:01

## 2017-05-30 NOTE — PROGRESS NOTE ADULT - ATTENDING COMMENTS
88F COPD PPM due to symptomatic bradycardia HTN with metabolic encephalopathy due to UTI, ROMIE on CKD3, stercoral colitis  --IVF for likely prerenal ROMIE on CKD3  CTX for UTI, f/u cx  metabolic encephalopathy improving  bowel regimen  s/p interrogation of PPM, functioning well 88F COPD PPM due to symptomatic bradycardia HTN with metabolic encephalopathy due to UTI, ROMIE on CKD3, stercoral colitis,  abnormal EKG without evidence of ACS, paroxysmal afib  --ACS ruled out, EKG stable  --paroxysmal afib, no AC due to hx ICH  --IVF for likely prerenal ROMIE on CKD3  CTX for UTI, f/u cx  metabolic encephalopathy improving  bowel regimen  s/p interrogation of PPM, functioning well

## 2017-05-30 NOTE — PROGRESS NOTE ADULT - ASSESSMENT
88F PMH dementia, B/L hip replacements, Afib (not on AC due to previous ICH), symptomatic bradycardia (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), COPD, CKD p/w failure to thrive and junctional bradycardia, found to have UTI and possible stercoral colitis.    # Metabolic encephalopathyFailure to thrive in adult: Likely 2/2 UTI. Significantly improved today s/p abx treatment     # UTI - C/w ceftriaxone, total 3d course. F/u UCx results.    # Junctional bradycardia - ppm interrogation - ppm interrogated in the AMtoday.    # ROMIE - Likely pre-renal. Gentle IVF and follow-up labs a  - Urine lytes  - Renal ultrasound.     Problem/Plan - 5:  ·  Problem: Fecal impaction in rectumColitis.  Plan: - Possible stercoral colitis on CT. Benign exam.  - Bowel regimen for now, and monitor- Possible colitis on CT. Benign exam.  - Bowel regimen for now.     Problem/Plan - 6:  Problem: HTN (hypertension). Plan: Avoid beta blockers given hx symptomatic bradycardia.  Avoid ACEi at this time given ROMIE on CKD  Will give hydralazine and monitor.    Problem/Plan - 7:  ·  Problem: Need for prophylactic measure.  Plan: - Hep SQ. 88F PMH dementia, B/L hip replacements, Afib (not on AC due to previous ICH), symptomatic bradycardia (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), COPD, CKD p/w failure to thrive and junctional bradycardia, found to have UTI and possible stercoral colitis.    # Metabolic encephalopathyFailure to thrive in adult: Likely 2/2 UTI. Significantly improved today s/p abx treatment     # UTI - C/w ceftriaxone, total 3d course. F/u UCx results.    # Junctional bradycardia - ppm interrogation - ppm interrogated in the AMtoday.    # ROMIE - Likely pre-renal. Gentle IVF and follow-up labs  # Fecal impaction in rectumColitis - Possible stercoral colitis on CT. Benign exam.   - On senna, miralax, colace. Will give suppository today to try to stimulate BM    # HTN - Hypertensive on arrival, BP now controlled. Avoid beta blockers given hx symptomatic bradycardia. Avoid ACEi at this time given ROMIE on CKD.    # DVT PPx: Hep SQ. 88F PMH dementia, B/L hip replacements, Afib (not on AC due to previous ICH), symptomatic bradycardia (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), COPD, CKD3 p/w failure to thrive and junctional bradycardia, found to have UTI and possible stercoral colitis.    # Metabolic encephalopathyFailure to thrive in adult: Likely 2/2 UTI. Significantly improved today s/p abx treatment     # UTI - C/w ceftriaxone, total 3d course. F/u UCx results.    # Junctional bradycardia - ppm interrogation - ppm interrogated in the AMtoday.    # ROMIE - Likely pre-renal. Gentle IVF and follow-up labs  # Fecal impaction in rectumColitis - Possible stercoral colitis on CT. Benign exam.   - On senna, miralax, colace. Will give suppository today to try to stimulate BM    # HTN - Hypertensive on arrival, BP now controlled. Avoid beta blockers given hx symptomatic bradycardia. Avoid ACEi at this time given ROMIE on CKD.    # DVT PPx: Hep SQ. 88F PMH dementia, B/L hip replacements, Afib (not on AC due to previous ICH), symptomatic bradycardia (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), COPD, CKD3 p/w failure to thrive and junctional bradycardia, found to have UTI and possible stercoral colitis     # Metabolic encephalopathyFailure to thrive in adult: Likely 2/2 UTI. Significantly improved today s/p abx treatment     # UTI - C/w ceftriaxone, total 3d course. F/u UCx results.    # Junctional bradycardia - ppm interrogation - ppm interrogated in the AMtoday.    # ROMIE - Likely pre-renal. Gentle IVF and follow-up labs  # Fecal impaction in rectumColitis - Possible stercoral colitis on CT. Benign exam.   - On senna, miralax, colace. Will give suppository today to try to stimulate BM    # HTN - Hypertensive on arrival, BP now controlled. Avoid beta blockers given hx symptomatic bradycardia. Avoid ACEi at this time given ROMIE on CKD.    # DVT PPx: Hep SQ.

## 2017-05-30 NOTE — PROGRESS NOTE ADULT - SUBJECTIVE AND OBJECTIVE BOX
Patient is a 88y old  Female who presents with a chief complaint of Failure to thrive, AMS (29 May 2017 16:53)      SUBJECTIVE / OVERNIGHT EVENTS:    MEDICATIONS  (STANDING):  heparin  Injectable 5000Unit(s) SubCutaneous every 12 hours  levothyroxine Injectable 25MICROGram(s) IV Push daily  clonazePAM Tablet 0.5milliGRAM(s) Oral at bedtime  digoxin     Tablet 0.125milliGRAM(s) Oral daily  aspirin  chewable 81milliGRAM(s) Oral daily  cefTRIAXone   IVPB 1Gram(s) IV Intermittent every 24 hours  sodium chloride 0.9%. 1000milliLiter(s) IV Continuous <Continuous>  senna 2Tablet(s) Oral at bedtime  docusate sodium 100milliGRAM(s) Oral three times a day  polyethylene glycol 3350 17Gram(s) Oral daily    MEDICATIONS  (PRN):  bisacodyl Suppository 10milliGRAM(s) Rectal daily PRN Constipation      Vital Signs Last 24 Hrs  T(C): 36.8, Max: 36.8 ( @ 22:00)  HR: 54 (54 - 70)  BP: 145/74 (131/99 - 186/145)  RR: 16 (16 - 19)  SpO2: 97% (95% - 99%)  Wt(kg): --  CAPILLARY BLOOD GLUCOSE    I&O's Summary      PHYSICAL EXAM:  GENERAL: NAD, well-developed  HEAD:  Atraumatic, Normocephalic  EYES: EOMI, PERRLA, conjunctiva and sclera clear  NECK: Supple, No JVD  CHEST/LUNG: Clear to auscultation bilaterally; No wheeze  HEART: Regular rate and rhythm; No murmurs, rubs, or gallops  ABDOMEN: Soft, Nontender, Nondistended; Bowel sounds present  EXTREMITIES:  2+ Peripheral Pulses, No clubbing, cyanosis, or edema  PSYCH: AAOx3  NEUROLOGY: non-focal  SKIN: No rashes or lesions    LABS:                        15.7   11.61 )-----------( 275      ( 29 May 2017 13:00 )             49.0         143  |  105  |  46<H>  ----------------------------<  137<H>  4.4   |  20<L>  |  1.38<H>    Ca    10.0      29 May 2017 13:00    TPro  8.6<H>  /  Alb  3.9  /  TBili  0.7  /  DBili  x   /  AST  23  /  ALT  12  /  AlkPhos  101  05-29      CARDIAC MARKERS ( 30 May 2017 02:25 )  x     / < 0.06 ng/mL / 56 u/L / 5.17 ng/mL / x      CARDIAC MARKERS ( 29 May 2017 19:44 )  x     / < 0.06 ng/mL / 59 u/L / 5.33 ng/mL / x      CARDIAC MARKERS ( 29 May 2017 13:00 )  x     / < 0.06 ng/mL / 59 u/L / 5.49 ng/mL / x          Urinalysis Basic - ( 29 May 2017 13:40 )    Color: YELLOW / Appearance: CLEAR / S.022 / pH: 6.0  Gluc: NEGATIVE / Ketone: NEGATIVE  / Bili: NEGATIVE / Urobili: NORMAL E.U.   Blood: TRACE / Protein: 100 / Nitrite: POSITIVE   Leuk Esterase: MODERATE / RBC: 10-25 / WBC 10-25   Sq Epi: OCC / Non Sq Epi: x / Bacteria: MOD        RADIOLOGY & ADDITIONAL TESTS:    Imaging Personally Reviewed:    Consultant(s) Notes Reviewed:      Care Discussed with Consultants/Other Providers:

## 2017-05-30 NOTE — PROGRESS NOTE ADULT - SUBJECTIVE AND OBJECTIVE BOX
ELECTROPHYSIOLOGY  Device Interrogation Performed                                  Date/Time: 5/30/17 1300  :  [employer]    Model: Advisa                    Mode:    DDD            Rate:    50/120    Atrial Lead:  P wave amplitude:    0.6 mV          Impedence:    532  Ohms      Threshold:   0.25    V@   0.50   ms      Ventricular Lead(s):  RV Lead: R wave amplitude:     3.8    mv          Impedence:  399    Ohms      Threshold:  0.75   V@  0.40   ms       Battery Status:              X       Good                SATURNINO                     EOL    Underlying Rhythm:   sinus rhythm  AS-VS 43.9%; AS- 12.6%; AP-VS 8.7%; AP- 34.8%    Events/Observation: multiple episodes of ATach/AFib w/ RVR, last recorded 5/17/2017    Impression/Plan:  Normal PPM.   Normal sensing and pacing via iterative testing. Good battery status. Excellent threshold capture.  No reprogramming.

## 2017-05-30 NOTE — PHYSICAL THERAPY INITIAL EVALUATION ADULT - RANGE OF MOTION EXAMINATION, REHAB EVAL
bilateral upper extremity ROM was WFL (within functional limits)/bilateral lower extremity ROM was WFL (within functional limits)/deficits as listed below/b/l knee/: 30-90, b/l ankle df: -10

## 2017-05-31 ENCOUNTER — TRANSCRIPTION ENCOUNTER (OUTPATIENT)
Age: 82
End: 2017-05-31

## 2017-05-31 VITALS — WEIGHT: 156.97 LBS

## 2017-05-31 LAB
BASOPHILS # BLD AUTO: 0.11 K/UL — SIGNIFICANT CHANGE UP (ref 0–0.2)
BASOPHILS NFR BLD AUTO: 1.2 % — SIGNIFICANT CHANGE UP (ref 0–2)
BUN SERPL-MCNC: 35 MG/DL — HIGH (ref 7–23)
CALCIUM SERPL-MCNC: 9.5 MG/DL — SIGNIFICANT CHANGE UP (ref 8.4–10.5)
CHLORIDE SERPL-SCNC: 109 MMOL/L — HIGH (ref 98–107)
CO2 SERPL-SCNC: 16 MMOL/L — LOW (ref 22–31)
CREAT SERPL-MCNC: 1.17 MG/DL — SIGNIFICANT CHANGE UP (ref 0.5–1.3)
EOSINOPHIL # BLD AUTO: 0.38 K/UL — SIGNIFICANT CHANGE UP (ref 0–0.5)
EOSINOPHIL NFR BLD AUTO: 4.1 % — SIGNIFICANT CHANGE UP (ref 0–6)
GLUCOSE SERPL-MCNC: 85 MG/DL — SIGNIFICANT CHANGE UP (ref 70–99)
HCT VFR BLD CALC: 44.7 % — SIGNIFICANT CHANGE UP (ref 34.5–45)
HGB BLD-MCNC: 14.2 G/DL — SIGNIFICANT CHANGE UP (ref 11.5–15.5)
IMM GRANULOCYTES NFR BLD AUTO: 0.2 % — SIGNIFICANT CHANGE UP (ref 0–1.5)
LYMPHOCYTES # BLD AUTO: 2.39 K/UL — SIGNIFICANT CHANGE UP (ref 1–3.3)
LYMPHOCYTES # BLD AUTO: 25.6 % — SIGNIFICANT CHANGE UP (ref 13–44)
MAGNESIUM SERPL-MCNC: 2.1 MG/DL — SIGNIFICANT CHANGE UP (ref 1.6–2.6)
MCHC RBC-ENTMCNC: 28.2 PG — SIGNIFICANT CHANGE UP (ref 27–34)
MCHC RBC-ENTMCNC: 31.8 % — LOW (ref 32–36)
MCV RBC AUTO: 88.7 FL — SIGNIFICANT CHANGE UP (ref 80–100)
METHOD TYPE: SIGNIFICANT CHANGE UP
MONOCYTES # BLD AUTO: 0.52 K/UL — SIGNIFICANT CHANGE UP (ref 0–0.9)
MONOCYTES NFR BLD AUTO: 5.6 % — SIGNIFICANT CHANGE UP (ref 2–14)
NEUTROPHILS # BLD AUTO: 5.92 K/UL — SIGNIFICANT CHANGE UP (ref 1.8–7.4)
NEUTROPHILS NFR BLD AUTO: 63.3 % — SIGNIFICANT CHANGE UP (ref 43–77)
ORGANISM # SPEC MICROSCOPIC CNT: SIGNIFICANT CHANGE UP
PHOSPHATE SERPL-MCNC: 3.1 MG/DL — SIGNIFICANT CHANGE UP (ref 2.5–4.5)
PLATELET # BLD AUTO: 238 K/UL — SIGNIFICANT CHANGE UP (ref 150–400)
PMV BLD: 11.2 FL — SIGNIFICANT CHANGE UP (ref 7–13)
POTASSIUM SERPL-MCNC: 4.1 MMOL/L — SIGNIFICANT CHANGE UP (ref 3.5–5.3)
POTASSIUM SERPL-SCNC: 4.1 MMOL/L — SIGNIFICANT CHANGE UP (ref 3.5–5.3)
RBC # BLD: 5.04 M/UL — SIGNIFICANT CHANGE UP (ref 3.8–5.2)
RBC # FLD: 16.4 % — HIGH (ref 10.3–14.5)
SODIUM SERPL-SCNC: 144 MMOL/L — SIGNIFICANT CHANGE UP (ref 135–145)
WBC # BLD: 9.34 K/UL — SIGNIFICANT CHANGE UP (ref 3.8–10.5)
WBC # FLD AUTO: 9.34 K/UL — SIGNIFICANT CHANGE UP (ref 3.8–10.5)

## 2017-05-31 PROCEDURE — 99239 HOSP IP/OBS DSCHRG MGMT >30: CPT

## 2017-05-31 RX ORDER — ASPIRIN/CALCIUM CARB/MAGNESIUM 324 MG
1 TABLET ORAL
Qty: 0 | Refills: 0 | COMMUNITY
Start: 2017-05-31

## 2017-05-31 RX ORDER — DOCUSATE SODIUM 100 MG
1 CAPSULE ORAL
Qty: 0 | Refills: 0 | COMMUNITY
Start: 2017-05-31

## 2017-05-31 RX ADMIN — CEFTRIAXONE 100 GRAM(S): 500 INJECTION, POWDER, FOR SOLUTION INTRAMUSCULAR; INTRAVENOUS at 14:42

## 2017-05-31 RX ADMIN — Medication 25 MICROGRAM(S): at 05:08

## 2017-05-31 RX ADMIN — Medication 81 MILLIGRAM(S): at 12:40

## 2017-05-31 RX ADMIN — Medication 100 MILLIGRAM(S): at 12:40

## 2017-05-31 RX ADMIN — HEPARIN SODIUM 5000 UNIT(S): 5000 INJECTION INTRAVENOUS; SUBCUTANEOUS at 05:08

## 2017-05-31 RX ADMIN — Medication 0.12 MILLIGRAM(S): at 05:08

## 2017-05-31 NOTE — SWALLOW BEDSIDE ASSESSMENT ADULT - ASR SWALLOW LINGUAL MOBILITY
impaired anterior elevation/impaired right lateral movement/impaired left lateral movement/impaired protrusion

## 2017-05-31 NOTE — DISCHARGE NOTE ADULT - MEDICATION SUMMARY - MEDICATIONS TO TAKE
I will START or STAY ON the medications listed below when I get home from the hospital:    aspirin 81 mg oral tablet, chewable  -- 1 tab(s) by mouth once a day  -- Indication: For Arrhythmia    digoxin 125 mcg (0.125 mg) oral tablet  -- 1 tab(s) by mouth once a day  -- Indication: For Arrhythmia    clonazePAM 0.5 mg oral tablet  -- 0.5 milligram(s) by mouth 2 times a day  -- Indication: For Anxiety    furosemide 40 mg oral tablet  -- 1 tab(s) by mouth once a day  -- Indication: For Intermittent use for lower extremity swelling    famotidine 20 mg oral tablet  -- 1 tab(s) by mouth once a day  -- Indication: For GERD    Colace 100 mg oral capsule  -- 1 cap(s) by mouth 3 times a day  -- Indication: For Constipation    levothyroxine 50 mcg (0.05 mg) oral tablet  -- 1 tab(s) by mouth once a day  -- Indication: For Hypothyroidism

## 2017-05-31 NOTE — PROGRESS NOTE ADULT - ASSESSMENT
88F PMH dementia, B/L hip replacements, Afib (not on AC due to previous ICH), symptomatic bradycardia (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), COPD, CKD3 p/w failure to thrive and junctional bradycardia, found to have UTI and possible stercoral colitis     # Metabolic encephalopathyFailure to thrive in adult: Likely 2/2 UTI. improved w/abx treatment     # UTI - C/w ceftriaxone, total 3d course. GNR.  # Junctional bradycardia - s/p ppm interrogation- ppm interrogated in the AM, no events.  # ORMIE - Improving with gentle IVF. Likely pre-renal.  # Fecal impaction in rectumColitis - Possible stercoral colitis on CT. Benign exam.   - On senna, miralax, colace. S/p bisacodyl suppository. 1 stool count    # HTN - Hypertensive on arrival, BP largely normal but SBP ranging up to 179. Avoid beta blockers given hx symptomatic bradycardia. Avoid ACEi at this time given ROMIE on CKD. Likely runs high. Can give hydralazine as needed, outpatient follow-up.    # DVT PPx: Hep SQ.

## 2017-05-31 NOTE — DIETITIAN INITIAL EVALUATION ADULT. - OTHER INFO
Nutrition Consult X chewing/swallowing difficulty. Pt 89 yo female appears alert, somewhat confused (?). No family @ bed side. Per nurse Pt's appetite fair now; no nausea/vomiting/diarrhea @ present. Pt on Dysphagia 1 Pureed diet with no liquids. Noted Speech Bedside Swallow Evaluation pending. Case discussed with Medical Team; Medical Team to add Thickened liquids to Pt's diet order. Pt not sure about her height or her weight or any weight changes.

## 2017-05-31 NOTE — PROGRESS NOTE ADULT - ATTENDING COMMENTS
88F COPD PPM due to symptomatic bradycardia HTN with metabolic encephalopathy due to UTI, ROMIE on CKD3, stercoral colitis,  abnormal EKG without evidence of ACS, paroxysmal afib  --ACS ruled out, EKG stable  --paroxysmal afib, no AC due to hx ICH  --prerenal ROMIE on CKD3, improved  CTX for UTI, Cx E coli sens to CTX  metabolic encephalopathy improved, MS at baseline  Had BM  s/p interrogation of PPM, functioning well  stable for d/c home  d/c time 45 min

## 2017-05-31 NOTE — PROGRESS NOTE ADULT - SUBJECTIVE AND OBJECTIVE BOX
Patient is a 88y old  Female who presents with a chief complaint of Failure to thrive, AMS (29 May 2017 16:53)      SUBJECTIVE / OVERNIGHT EVENTS: No events overnight.    MEDICATIONS  (STANDING):  heparin  Injectable 5000Unit(s) SubCutaneous every 12 hours  levothyroxine Injectable 25MICROGram(s) IV Push daily  clonazePAM Tablet 0.5milliGRAM(s) Oral at bedtime  digoxin     Tablet 0.125milliGRAM(s) Oral daily  aspirin  chewable 81milliGRAM(s) Oral daily  cefTRIAXone   IVPB 1Gram(s) IV Intermittent every 24 hours  senna 2Tablet(s) Oral at bedtime  docusate sodium 100milliGRAM(s) Oral three times a day  polyethylene glycol 3350 17Gram(s) Oral daily    MEDICATIONS  (PRN):  bisacodyl Suppository 10milliGRAM(s) Rectal daily PRN Constipation      Vital Signs Last 24 Hrs  T(C): 36.8, Max: 37.1 (05-30 @ 18:05)  HR: 52 (50 - 56)  BP: 156/67 (135/93 - 179/74)  RR: 18 (16 - 20)  SpO2: 97% (96% - 100%)  Wt(kg): --  CAPILLARY BLOOD GLUCOSE    I&O's Summary      PHYSICAL EXAM:  GENERAL: NAD, well-developed  HEAD:  Atraumatic, Normocephalic  EYES: EOMI, PERRLA, conjunctiva and sclera clear  NECK: Supple, No JVD  CHEST/LUNG: Clear to auscultation bilaterally; No wheeze  HEART: Regular rate and rhythm; No murmurs, rubs, or gallops  ABDOMEN: Soft, Nontender, Nondistended; Bowel sounds present  EXTREMITIES:  2+ Peripheral Pulses, No clubbing, cyanosis, or edema  PSYCH: AAOx3  NEUROLOGY: non-focal  SKIN: No rashes or lesions    LABS:         RADIOLOGY & ADDITIONAL TESTS:    Imaging Personally Reviewed:    Consultant(s) Notes Reviewed:  Physical therapy, Electrophysiology    Care Discussed with Consultants/Other Providers: Patient is a 88y old  Female who presents with a chief complaint of Failure to thrive, AMS (29 May 2017 16:53)      SUBJECTIVE / OVERNIGHT EVENTS: No events overnight. Large BM this morning.    MEDICATIONS  (STANDING):  heparin  Injectable 5000Unit(s) SubCutaneous every 12 hours  levothyroxine Injectable 25MICROGram(s) IV Push daily  clonazePAM Tablet 0.5milliGRAM(s) Oral at bedtime  digoxin     Tablet 0.125milliGRAM(s) Oral daily  aspirin  chewable 81milliGRAM(s) Oral daily  cefTRIAXone   IVPB 1Gram(s) IV Intermittent every 24 hours  senna 2Tablet(s) Oral at bedtime  docusate sodium 100milliGRAM(s) Oral three times a day  polyethylene glycol 3350 17Gram(s) Oral daily    MEDICATIONS  (PRN):  bisacodyl Suppository 10milliGRAM(s) Rectal daily PRN Constipation      Vital Signs Last 24 Hrs  T(C): 36.8, Max: 37.1 (05-30 @ 18:05)  HR: 52 (50 - 56)  BP: 156/67 (135/93 - 179/74)  RR: 18 (16 - 20)  SpO2: 97% (96% - 100%)  Wt(kg): --  CAPILLARY BLOOD GLUCOSE    I&O's Summary      PHYSICAL EXAM:  GENERAL: NAD, well-developed  HEAD:  Atraumatic, Normocephalic  EYES: EOMI, PERRLA, conjunctiva and sclera clear  NECK: Supple, No JVD  CHEST/LUNG: Clear to auscultation bilaterally; No wheeze  HEART: Regular rate and rhythm; No murmurs, rubs, or gallops  ABDOMEN: Soft, Nontender, Nondistended; Bowel sounds present  EXTREMITIES:  2+ Peripheral Pulses, No clubbing, cyanosis, or edema  PSYCH: AAOx3  NEUROLOGY: non-focal  SKIN: No rashes or lesions    LABS:                        14.2   9.34  )-----------( 238      ( 31 May 2017 06:30 )             44.7   05-31    144  |  109<H>  |  35<H>  ----------------------------<  85  4.1   |  16<L>  |  1.17    Ca    9.5      31 May 2017 06:30  Phos  3.1     05-31  Mg     2.1     05-31    TPro  8.6<H>  /  Alb  3.9  /  TBili  0.7  /  DBili  x   /  AST  23  /  ALT  12  /  AlkPhos  101  05-29           RADIOLOGY & ADDITIONAL TESTS:    Imaging Personally Reviewed:    Consultant(s) Notes Reviewed:  Physical therapy, Electrophysiology    Care Discussed with Consultants/Other Providers:

## 2017-05-31 NOTE — DISCHARGE NOTE ADULT - PATIENT PORTAL LINK FT
“You can access the [employer] Patient Portal, offered by [facility], by registering with the following website: [email or web address]”

## 2017-05-31 NOTE — SWALLOW BEDSIDE ASSESSMENT ADULT - PHARYNGEAL PHASE
Decreased laryngeal elevation/Delayed pharyngeal swallow Delayed pharyngeal swallow/Decreased laryngeal elevation/Throat clear post oral intake

## 2017-05-31 NOTE — SWALLOW BEDSIDE ASSESSMENT ADULT - COMMENTS
The patient was seen this PM for initial assessment of swallow function, at which time patient was alert and cooperative. Per charting, the patient is an 87 y/o F with PMHx significant of dementia, B/L hip replacements, Afib (not on AC due to previous ICH), symptomatic bradycardia (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), COPD, and CKD. She presented to [facility] for failure to thrive and junctional bradycardia, found to have UTI and possible stercoral colitis. Upon further chart review, the patient had a Modified Barium Swallow Study completed on 7/25/16 (please see full report for details), at which time a puree with honey thick liquid diet with strict aspiration precautions was recommended. It is unknown if the patient has been compliant with this recommendation at home. Discussed results and recommendations from this evaluation with the patient and Team 4.

## 2017-05-31 NOTE — SWALLOW BEDSIDE ASSESSMENT ADULT - MUCOSAL QUALITY
Intra-oral examination revealed a white substance on lingual surface suggestive of possible thrush. MD made aware.

## 2017-05-31 NOTE — DISCHARGE NOTE ADULT - CARE PROVIDERS DIRECT ADDRESSES
[email or web address]

## 2017-05-31 NOTE — DISCHARGE NOTE ADULT - PLAN OF CARE
Follow-up with your doctor When you came in your mental status was altered. This was because you had a urinary tract infection. Your urine grew pan-sensitive E. coli. You had a full course (3 days) of IV antibiotics (Ceftriaxone) in the hospital. Please follow-up with your primary care physician Follow-up with your doctor for a regimen to ensure healthy bowel movements When you arrived in the emergency department, you had a CT Scan of the abdomen, which demonstrated stool impaction in the colon. You had a bowel movement and passed this on your third day of admission after a regimen and suppositories. You can continue with colace, at this time, to ensure regular bowel movements. Follow-up with your primary doctor for any adjustments, as needed. Follow a Dysphagia 1 diet; honey-thick liquids. Follow-up with your doctor Follow the appropriate diet based on the assessment by speech and swallow.   You were recommended to follow-up for a cinesophagram to better study your swallowing. Please follow with your doctor.

## 2017-05-31 NOTE — SWALLOW BEDSIDE ASSESSMENT ADULT - ASR SWALLOW ASPIRATION MONITOR
pneumonia/throat clearing/oral hygiene/change of breathing pattern/fever/position upright (89Y)/cough/gurgly voice/upper respiratory infection

## 2017-05-31 NOTE — DISCHARGE NOTE ADULT - CARE PROVIDER_API CALL
Mahogany Sanhcez (MD), Cardiovascular Disease  [address] [place] [address]  Phone: [phone]  Fax: [phone]    Dr. Waters,   Primary Medical Doctor  Phone: (   )    -  Fax: (   )    -

## 2017-05-31 NOTE — SWALLOW BEDSIDE ASSESSMENT ADULT - SWALLOW EVAL: DIAGNOSIS
1. The patient demonstrated a mild oral dysphagia for puree, honey thick, and nectar thick liquid textures marked by delayed bolus collection, transfer, and posterior transport. 2. The patient demonstrated a mild-moderate oral dysphagia for thin liquid textures marked by delayed bolus collection and transfer with suspected posterior loss over the base of tongue. 3. The patient demonstrated a mild pharyngeal dysphagia for puree and honey thick liquid textures marked by delayed swallow trigger with reduced hyolaryngeal elevation upon digital palpation without any evidence of airway penetration. 4. The patient demonstrated a mild-moderate pharyngeal dysphagia for nectar thick and thin liquid textures marked by delayed swallow trigger with reduced hyolaryngeal elevation upon palpation resulting in delayed throat clearing suggesting possible airway penetration.

## 2017-05-31 NOTE — DISCHARGE NOTE ADULT - HOSPITAL COURSE
HPI:   88F PMH dementia, B/L hip replacements, Afib (not on AC due to previous ICH), symptomatic bradycardia (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), COPD, CKD p/w worsened decrease in appetite over past day. As per daughter, appetite has deteriorated over the last three day. Daughter tried to give boost, which pt tolerated and then patient vomited up everything. Patient is A&O x 1. She is in a wheel chair at baseline and her family uses a neel lift to get her out of bed. Denies any pain. otherwise as per daughter pt at baseline.    Patient's daughter states that she has not been eating over the past 2 days. She has been finding food or pills in her mouth not fully eaten. Patient denies any CP, abdominal pain, SOB, dysuria, frequency, dizziness, fevers. Patient does have chills.  Patient does not have any HCP, she is taken care of by her daughter and grand daughter. Daughter states that she would be decision maker.   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     ED Vitals: T 96.7, HR 55, /106, RR 16, SpO2 95% on RA.  Initial Labs: WBC 11.61, Cr 1.38, Trop < .06. Lactate 1.9. Dig 1.2. UA positive nitrite, moderate LE, moderate bacteremia.   Initial imaging: CTH - No mass effect, hemorrhage or evidence of acute intracranial pathology. CT A/P Colonic diverticulosis without diverticulitis. Stool-filled rectum with mild wall thickening. Possible stercoral colitis.  Patient had EKG concerning for ACS and was seen by cardiology, who determined the rhythm to be junctional bradycardia with   Patient was given 1L bolus NS and ceftriaxone.  Patient had a 3 day course of Ceftriaxone for a pan-sensitive E. coli UTI. Her mental status improved to near baseline by day after hospitalization. She was given gentle IVF for mild pre-renal ROMIE. She received a bowel regimen and suppository which resulted in a large bowel movement on her third day of hospitalization. Patient was seen by PT with recommendation of home with home PT, which patient's family declined. She was also scheduled to be seen by speech and swallow, although her mental status had significantly improved and her family declined the assessment and wanted to take her home. HPI:   88F PMH dementia, B/L hip replacements, Afib (not on AC due to previous ICH), symptomatic bradycardia (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), COPD, CKD p/w worsened decrease in appetite over past day. As per daughter, appetite has deteriorated over the last three day. Daughter tried to give boost, which pt tolerated and then patient vomited up everything. Patient is A&O x 1. She is in a wheel chair at baseline and her family uses a neel lift to get her out of bed. Denies any pain. otherwise as per daughter pt at baseline.    Patient's daughter states that she has not been eating over the past 2 days. She has been finding food or pills in her mouth not fully eaten. Patient denies any CP, abdominal pain, SOB, dysuria, frequency, dizziness, fevers. Patient does have chills.  Patient does not have any HCP, she is taken care of by her daughter and grand daughter. Daughter states that she would be decision maker.   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     ED Vitals: T 96.7, HR 55, /106, RR 16, SpO2 95% on RA.  Initial Labs: WBC 11.61, Cr 1.38, Trop < .06. Lactate 1.9. Dig 1.2. UA positive nitrite, moderate LE, moderate bacteremia.   Initial imaging: CTH - No mass effect, hemorrhage or evidence of acute intracranial pathology. CT A/P Colonic diverticulosis without diverticulitis. Stool-filled rectum with mild wall thickening. Possible stercoral colitis.    Patient had EKG concerning for ACS and was seen by cardiology, who determined the rhythm to be junctional bradycardia without ischemic features. Cardiac enzymes were trended and negative. Pacemaker was interrogated and showed no events.    Patient was given 1L bolus NS and ceftriaxone.  Patient had a 3 day course of Ceftriaxone for a pan-sensitive E. coli UTI. Her mental status improved to near baseline by day after hospitalization. She was given gentle IVF for mild pre-renal ROMIE. She received a bowel regimen and suppository which resulted in a large bowel movement on her third day of hospitalization. Patient was seen by PT with recommendation of home with home PT, which patient's family declined. She was also scheduled to be seen by speech and swallow, although her mental status had significantly improved and her family declined the assessment and wanted to take her home.

## 2017-05-31 NOTE — DISCHARGE NOTE ADULT - CARE PLAN
Principal Discharge DX:	Urinary tract infection  Goal:	Follow-up with your doctor  Instructions for follow-up, activity and diet:	When you came in your mental status was altered. This was because you had a urinary tract infection. Your urine grew pan-sensitive E. coli. You had a full course (3 days) of IV antibiotics (Ceftriaxone) in the hospital. Please follow-up with your primary care physician  Secondary Diagnosis:	Constipation  Goal:	Follow-up with your doctor for a regimen to ensure healthy bowel movements  Instructions for follow-up, activity and diet:	When you arrived in the emergency department, you had a CT Scan of the abdomen, which demonstrated stool impaction in the colon. You had a bowel movement and passed this on your third day of admission after a regimen and suppositories. You can continue with colace, at this time, to ensure regular bowel movements. Follow-up with your primary doctor for any adjustments, as needed. Principal Discharge DX:	Urinary tract infection  Goal:	Follow-up with your doctor  Instructions for follow-up, activity and diet:	When you came in your mental status was altered. This was because you had a urinary tract infection. Your urine grew pan-sensitive E. coli. You had a full course (3 days) of IV antibiotics (Ceftriaxone) in the hospital. Please follow-up with your primary care physician  Secondary Diagnosis:	Constipation  Goal:	Follow-up with your doctor for a regimen to ensure healthy bowel movements  Instructions for follow-up, activity and diet:	When you arrived in the emergency department, you had a CT Scan of the abdomen, which demonstrated stool impaction in the colon. You had a bowel movement and passed this on your third day of admission after a regimen and suppositories. You can continue with colace, at this time, to ensure regular bowel movements. Follow-up with your primary doctor for any adjustments, as needed.  Secondary Diagnosis:	Dysphagia, unspecified type  Goal:	Follow a Dysphagia 1 diet; honey-thick liquids. Follow-up with your doctor  Instructions for follow-up, activity and diet:	Follow the appropriate diet based on the assessment by speech and swallow.   You were recommended to follow-up for a cinesophagram to better study your swallowing. Please follow with your doctor.

## 2017-05-31 NOTE — SWALLOW BEDSIDE ASSESSMENT ADULT - SWALLOW EVAL: RECOMMENDED FEEDING/EATING TECHNIQUES
position upright (90 degrees)/allow for swallow between intakes/no straws/maintain upright posture during/after eating for 30 mins/check mouth frequently for oral residue/pocketing/crush medication (when feasible)/alternate food with liquid/small sips/bites/oral hygiene

## 2017-06-01 LAB
-  AMIKACIN: SIGNIFICANT CHANGE UP
-  AMIKACIN: SIGNIFICANT CHANGE UP
-  AMPICILLIN/SULBACTAM: SIGNIFICANT CHANGE UP
-  AMPICILLIN/SULBACTAM: SIGNIFICANT CHANGE UP
-  AMPICILLIN: SIGNIFICANT CHANGE UP
-  AMPICILLIN: SIGNIFICANT CHANGE UP
-  AZTREONAM: SIGNIFICANT CHANGE UP
-  AZTREONAM: SIGNIFICANT CHANGE UP
-  CEFAZOLIN: SIGNIFICANT CHANGE UP
-  CEFAZOLIN: SIGNIFICANT CHANGE UP
-  CEFEPIME: SIGNIFICANT CHANGE UP
-  CEFEPIME: SIGNIFICANT CHANGE UP
-  CEFOXITIN: SIGNIFICANT CHANGE UP
-  CEFOXITIN: SIGNIFICANT CHANGE UP
-  CEFTAZIDIME: SIGNIFICANT CHANGE UP
-  CEFTAZIDIME: SIGNIFICANT CHANGE UP
-  CEFTRIAXONE: SIGNIFICANT CHANGE UP
-  CEFTRIAXONE: SIGNIFICANT CHANGE UP
-  CIPROFLOXACIN: SIGNIFICANT CHANGE UP
-  CIPROFLOXACIN: SIGNIFICANT CHANGE UP
-  ERTAPENEM: SIGNIFICANT CHANGE UP
-  ERTAPENEM: SIGNIFICANT CHANGE UP
-  GENTAMICIN: SIGNIFICANT CHANGE UP
-  GENTAMICIN: SIGNIFICANT CHANGE UP
-  IMIPENEM: SIGNIFICANT CHANGE UP
-  IMIPENEM: SIGNIFICANT CHANGE UP
-  LEVOFLOXACIN: SIGNIFICANT CHANGE UP
-  LEVOFLOXACIN: SIGNIFICANT CHANGE UP
-  MEROPENEM: SIGNIFICANT CHANGE UP
-  MEROPENEM: SIGNIFICANT CHANGE UP
-  NITROFURANTOIN: SIGNIFICANT CHANGE UP
-  NITROFURANTOIN: SIGNIFICANT CHANGE UP
-  PIPERACILLIN/TAZOBACTAM: SIGNIFICANT CHANGE UP
-  PIPERACILLIN/TAZOBACTAM: SIGNIFICANT CHANGE UP
-  TIGECYCLINE: SIGNIFICANT CHANGE UP
-  TIGECYCLINE: SIGNIFICANT CHANGE UP
-  TOBRAMYCIN: SIGNIFICANT CHANGE UP
-  TOBRAMYCIN: SIGNIFICANT CHANGE UP
-  TRIMETHOPRIM/SULFAMETHOXAZOLE: SIGNIFICANT CHANGE UP
-  TRIMETHOPRIM/SULFAMETHOXAZOLE: SIGNIFICANT CHANGE UP
BACTERIA UR CULT: SIGNIFICANT CHANGE UP
METHOD TYPE: SIGNIFICANT CHANGE UP
ORGANISM # SPEC MICROSCOPIC CNT: SIGNIFICANT CHANGE UP
ORGANISM # SPEC MICROSCOPIC CNT: SIGNIFICANT CHANGE UP

## 2017-06-07 ENCOUNTER — INPATIENT (INPATIENT)
Facility: HOSPITAL | Age: 82
LOS: 2 days | Discharge: HOME | End: 2017-06-10
Attending: HOSPITALIST | Admitting: HOSPITALIST
Payer: MEDICARE

## 2017-06-07 VITALS
RESPIRATION RATE: 24 BRPM | TEMPERATURE: 98 F | DIASTOLIC BLOOD PRESSURE: 117 MMHG | SYSTOLIC BLOOD PRESSURE: 155 MMHG | HEART RATE: 61 BPM | OXYGEN SATURATION: 93 %

## 2017-06-07 DIAGNOSIS — N17.9 ACUTE KIDNEY FAILURE, UNSPECIFIED: ICD-10-CM

## 2017-06-07 DIAGNOSIS — J69.0 PNEUMONITIS DUE TO INHALATION OF FOOD AND VOMIT: ICD-10-CM

## 2017-06-07 DIAGNOSIS — I48.91 UNSPECIFIED ATRIAL FIBRILLATION: ICD-10-CM

## 2017-06-07 DIAGNOSIS — Z29.9 ENCOUNTER FOR PROPHYLACTIC MEASURES, UNSPECIFIED: ICD-10-CM

## 2017-06-07 DIAGNOSIS — Z96.643 PRESENCE OF ARTIFICIAL HIP JOINT, BILATERAL: Chronic | ICD-10-CM

## 2017-06-07 DIAGNOSIS — R00.1 BRADYCARDIA, UNSPECIFIED: ICD-10-CM

## 2017-06-07 DIAGNOSIS — R13.10 DYSPHAGIA, UNSPECIFIED: ICD-10-CM

## 2017-06-07 DIAGNOSIS — M25.532 PAIN IN LEFT WRIST: ICD-10-CM

## 2017-06-07 DIAGNOSIS — R63.8 OTHER SYMPTOMS AND SIGNS CONCERNING FOOD AND FLUID INTAKE: ICD-10-CM

## 2017-06-07 DIAGNOSIS — Z98.89 OTHER SPECIFIED POSTPROCEDURAL STATES: Chronic | ICD-10-CM

## 2017-06-07 LAB
ALBUMIN SERPL ELPH-MCNC: 3.6 G/DL — SIGNIFICANT CHANGE UP (ref 3.3–5)
ALP SERPL-CCNC: 93 U/L — SIGNIFICANT CHANGE UP (ref 40–120)
ALT FLD-CCNC: 13 U/L — SIGNIFICANT CHANGE UP (ref 4–33)
APPEARANCE UR: CLEAR — SIGNIFICANT CHANGE UP
APTT BLD: 30.6 SEC — SIGNIFICANT CHANGE UP (ref 27.5–37.4)
AST SERPL-CCNC: 19 U/L — SIGNIFICANT CHANGE UP (ref 4–32)
BASE EXCESS BLDV CALC-SCNC: -1.9 MMOL/L — SIGNIFICANT CHANGE UP
BASOPHILS # BLD AUTO: 0.05 K/UL — SIGNIFICANT CHANGE UP (ref 0–0.2)
BASOPHILS NFR BLD AUTO: 0.3 % — SIGNIFICANT CHANGE UP (ref 0–2)
BILIRUB SERPL-MCNC: 0.7 MG/DL — SIGNIFICANT CHANGE UP (ref 0.2–1.2)
BILIRUB UR-MCNC: NEGATIVE — SIGNIFICANT CHANGE UP
BLOOD GAS VENOUS - CREATININE: 1.35 MG/DL — HIGH (ref 0.5–1.3)
BLOOD UR QL VISUAL: HIGH
BUN SERPL-MCNC: 37 MG/DL — HIGH (ref 7–23)
CALCIUM SERPL-MCNC: 9.7 MG/DL — SIGNIFICANT CHANGE UP (ref 8.4–10.5)
CHLORIDE BLDV-SCNC: 109 MMOL/L — HIGH (ref 96–108)
CHLORIDE SERPL-SCNC: 103 MMOL/L — SIGNIFICANT CHANGE UP (ref 98–107)
CO2 SERPL-SCNC: 20 MMOL/L — LOW (ref 22–31)
COLOR SPEC: YELLOW — SIGNIFICANT CHANGE UP
CREAT SERPL-MCNC: 1.45 MG/DL — HIGH (ref 0.5–1.3)
EOSINOPHIL # BLD AUTO: 0.2 K/UL — SIGNIFICANT CHANGE UP (ref 0–0.5)
EOSINOPHIL NFR BLD AUTO: 1.3 % — SIGNIFICANT CHANGE UP (ref 0–6)
GAS PNL BLDV: 140 MMOL/L — SIGNIFICANT CHANGE UP (ref 136–146)
GLUCOSE BLDV-MCNC: 129 — HIGH (ref 70–99)
GLUCOSE SERPL-MCNC: 123 MG/DL — HIGH (ref 70–99)
GLUCOSE UR-MCNC: NEGATIVE — SIGNIFICANT CHANGE UP
HCO3 BLDV-SCNC: 22 MMOL/L — SIGNIFICANT CHANGE UP (ref 20–27)
HCT VFR BLD CALC: 49.6 % — HIGH (ref 34.5–45)
HCT VFR BLDV CALC: 49.7 % — HIGH (ref 34.5–45)
HGB BLD-MCNC: 15.7 G/DL — HIGH (ref 11.5–15.5)
HGB BLDV-MCNC: 16.2 G/DL — HIGH (ref 11.5–15.5)
IMM GRANULOCYTES NFR BLD AUTO: 0.4 % — SIGNIFICANT CHANGE UP (ref 0–1.5)
INR BLD: 0.97 — SIGNIFICANT CHANGE UP (ref 0.88–1.17)
KETONES UR-MCNC: NEGATIVE — SIGNIFICANT CHANGE UP
LACTATE BLDV-MCNC: 1.9 MMOL/L — SIGNIFICANT CHANGE UP (ref 0.5–2)
LEUKOCYTE ESTERASE UR-ACNC: NEGATIVE — SIGNIFICANT CHANGE UP
LYMPHOCYTES # BLD AUTO: 20.3 % — SIGNIFICANT CHANGE UP (ref 13–44)
LYMPHOCYTES # BLD AUTO: 3.13 K/UL — SIGNIFICANT CHANGE UP (ref 1–3.3)
MCHC RBC-ENTMCNC: 28.3 PG — SIGNIFICANT CHANGE UP (ref 27–34)
MCHC RBC-ENTMCNC: 31.7 % — LOW (ref 32–36)
MCV RBC AUTO: 89.4 FL — SIGNIFICANT CHANGE UP (ref 80–100)
MONOCYTES # BLD AUTO: 0.96 K/UL — HIGH (ref 0–0.9)
MONOCYTES NFR BLD AUTO: 6.2 % — SIGNIFICANT CHANGE UP (ref 2–14)
MUCOUS THREADS # UR AUTO: SIGNIFICANT CHANGE UP
NEUTROPHILS # BLD AUTO: 11 K/UL — HIGH (ref 1.8–7.4)
NEUTROPHILS NFR BLD AUTO: 71.5 % — SIGNIFICANT CHANGE UP (ref 43–77)
NITRITE UR-MCNC: NEGATIVE — SIGNIFICANT CHANGE UP
NON-SQ EPI CELLS # UR AUTO: <1 — SIGNIFICANT CHANGE UP
PCO2 BLDV: 37 MMHG — LOW (ref 41–51)
PH BLDV: 7.39 PH — SIGNIFICANT CHANGE UP (ref 7.32–7.43)
PH UR: 6 — SIGNIFICANT CHANGE UP (ref 4.6–8)
PLATELET # BLD AUTO: 253 K/UL — SIGNIFICANT CHANGE UP (ref 150–400)
PMV BLD: 11.4 FL — SIGNIFICANT CHANGE UP (ref 7–13)
PO2 BLDV: 35 MMHG — SIGNIFICANT CHANGE UP (ref 35–40)
POTASSIUM BLDV-SCNC: 4.4 MMOL/L — SIGNIFICANT CHANGE UP (ref 3.4–4.5)
POTASSIUM SERPL-MCNC: 4.7 MMOL/L — SIGNIFICANT CHANGE UP (ref 3.5–5.3)
POTASSIUM SERPL-SCNC: 4.7 MMOL/L — SIGNIFICANT CHANGE UP (ref 3.5–5.3)
PROT SERPL-MCNC: 8.5 G/DL — HIGH (ref 6–8.3)
PROT UR-MCNC: 30 — HIGH
PROTHROM AB SERPL-ACNC: 10.9 SEC — SIGNIFICANT CHANGE UP (ref 9.8–13.1)
RBC # BLD: 5.55 M/UL — HIGH (ref 3.8–5.2)
RBC # FLD: 16.5 % — HIGH (ref 10.3–14.5)
RBC CASTS # UR COMP ASSIST: SIGNIFICANT CHANGE UP (ref 0–?)
SAO2 % BLDV: 60.6 % — SIGNIFICANT CHANGE UP (ref 60–85)
SODIUM SERPL-SCNC: 141 MMOL/L — SIGNIFICANT CHANGE UP (ref 135–145)
SP GR SPEC: 1.02 — SIGNIFICANT CHANGE UP (ref 1–1.03)
UROBILINOGEN FLD QL: NORMAL E.U. — SIGNIFICANT CHANGE UP (ref 0.1–0.2)
WBC # BLD: 15.4 K/UL — HIGH (ref 3.8–10.5)
WBC # FLD AUTO: 15.4 K/UL — HIGH (ref 3.8–10.5)
WBC UR QL: SIGNIFICANT CHANGE UP (ref 0–?)

## 2017-06-07 PROCEDURE — 73090 X-RAY EXAM OF FOREARM: CPT | Mod: 26,LT

## 2017-06-07 PROCEDURE — 71010: CPT | Mod: 26

## 2017-06-07 PROCEDURE — 73130 X-RAY EXAM OF HAND: CPT | Mod: 26,LT

## 2017-06-07 PROCEDURE — 73110 X-RAY EXAM OF WRIST: CPT | Mod: 26,LT

## 2017-06-07 PROCEDURE — 70450 CT HEAD/BRAIN W/O DYE: CPT | Mod: 26

## 2017-06-07 RX ORDER — FAMOTIDINE 10 MG/ML
20 INJECTION INTRAVENOUS DAILY
Qty: 0 | Refills: 0 | Status: DISCONTINUED | OUTPATIENT
Start: 2017-06-07 | End: 2017-06-07

## 2017-06-07 RX ORDER — ASPIRIN/CALCIUM CARB/MAGNESIUM 324 MG
81 TABLET ORAL DAILY
Qty: 0 | Refills: 0 | Status: DISCONTINUED | OUTPATIENT
Start: 2017-06-07 | End: 2017-06-07

## 2017-06-07 RX ORDER — HEPARIN SODIUM 5000 [USP'U]/ML
5000 INJECTION INTRAVENOUS; SUBCUTANEOUS EVERY 8 HOURS
Qty: 0 | Refills: 0 | Status: DISCONTINUED | OUTPATIENT
Start: 2017-06-07 | End: 2017-06-10

## 2017-06-07 RX ORDER — LEVOTHYROXINE SODIUM 125 MCG
25 TABLET ORAL DAILY
Qty: 0 | Refills: 0 | Status: DISCONTINUED | OUTPATIENT
Start: 2017-06-07 | End: 2017-06-10

## 2017-06-07 RX ORDER — LEVOTHYROXINE SODIUM 125 MCG
50 TABLET ORAL DAILY
Qty: 0 | Refills: 0 | Status: DISCONTINUED | OUTPATIENT
Start: 2017-06-07 | End: 2017-06-07

## 2017-06-07 RX ORDER — FUROSEMIDE 40 MG
40 TABLET ORAL DAILY
Qty: 0 | Refills: 0 | Status: DISCONTINUED | OUTPATIENT
Start: 2017-06-07 | End: 2017-06-07

## 2017-06-07 RX ORDER — DOCUSATE SODIUM 100 MG
100 CAPSULE ORAL THREE TIMES A DAY
Qty: 0 | Refills: 0 | Status: DISCONTINUED | OUTPATIENT
Start: 2017-06-07 | End: 2017-06-07

## 2017-06-07 RX ORDER — SODIUM CHLORIDE 9 MG/ML
1000 INJECTION INTRAMUSCULAR; INTRAVENOUS; SUBCUTANEOUS ONCE
Qty: 0 | Refills: 0 | Status: COMPLETED | OUTPATIENT
Start: 2017-06-07 | End: 2017-06-07

## 2017-06-07 RX ORDER — SODIUM CHLORIDE 9 MG/ML
1000 INJECTION, SOLUTION INTRAVENOUS
Qty: 0 | Refills: 0 | Status: DISCONTINUED | OUTPATIENT
Start: 2017-06-07 | End: 2017-06-08

## 2017-06-07 RX ORDER — SODIUM CHLORIDE 9 MG/ML
500 INJECTION INTRAMUSCULAR; INTRAVENOUS; SUBCUTANEOUS ONCE
Qty: 0 | Refills: 0 | Status: COMPLETED | OUTPATIENT
Start: 2017-06-07 | End: 2017-06-07

## 2017-06-07 RX ORDER — ACETAMINOPHEN 500 MG
650 TABLET ORAL ONCE
Qty: 0 | Refills: 0 | Status: COMPLETED | OUTPATIENT
Start: 2017-06-07 | End: 2017-06-07

## 2017-06-07 RX ORDER — CLONAZEPAM 1 MG
0.5 TABLET ORAL
Qty: 0 | Refills: 0 | Status: DISCONTINUED | OUTPATIENT
Start: 2017-06-07 | End: 2017-06-07

## 2017-06-07 RX ORDER — DIGOXIN 250 MCG
0.12 TABLET ORAL DAILY
Qty: 0 | Refills: 0 | Status: DISCONTINUED | OUTPATIENT
Start: 2017-06-07 | End: 2017-06-07

## 2017-06-07 RX ORDER — IPRATROPIUM/ALBUTEROL SULFATE 18-103MCG
3 AEROSOL WITH ADAPTER (GRAM) INHALATION EVERY 6 HOURS
Qty: 0 | Refills: 0 | Status: DISCONTINUED | OUTPATIENT
Start: 2017-06-07 | End: 2017-06-10

## 2017-06-07 RX ADMIN — SODIUM CHLORIDE 500 MILLILITER(S): 9 INJECTION INTRAMUSCULAR; INTRAVENOUS; SUBCUTANEOUS at 14:14

## 2017-06-07 RX ADMIN — Medication 650 MILLIGRAM(S): at 11:41

## 2017-06-07 RX ADMIN — HEPARIN SODIUM 5000 UNIT(S): 5000 INJECTION INTRAVENOUS; SUBCUTANEOUS at 23:00

## 2017-06-07 RX ADMIN — SODIUM CHLORIDE 50 MILLILITER(S): 9 INJECTION, SOLUTION INTRAVENOUS at 17:13

## 2017-06-07 RX ADMIN — Medication 3 MILLILITER(S): at 21:46

## 2017-06-07 RX ADMIN — SODIUM CHLORIDE 2000 MILLILITER(S): 9 INJECTION INTRAMUSCULAR; INTRAVENOUS; SUBCUTANEOUS at 14:23

## 2017-06-07 RX ADMIN — SODIUM CHLORIDE 50 MILLILITER(S): 9 INJECTION, SOLUTION INTRAVENOUS at 23:00

## 2017-06-07 NOTE — ED PROVIDER NOTE - OBJECTIVE STATEMENT
pt 89 y/o F with PMHx significant of dementia, B/L hip replacements, Afib (not on AC due to previous ICH), symptomatic bradycardia (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), COPD, and CKD. pt 89 y/o F with PMHx significant of dementia, B/L hip replacements, Afib (not on AC due to previous ICH), symptomatic bradycardia (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), COPD, and CKD. as per daughter pt had difficulty swallowing and facial drooping that started last night that was there this morning but has since resolved. Patient is A&O x 1 baseline. pt is not at her baseline as per daughter  denies fever, chill pt 87 y/o F with PMHx significant of dementia, B/L hip replacements, Afib (not on AC due to previous ICH), symptomatic bradycardia (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), COPD, and CKD. as per daughter pt had difficulty swallowing and facial drooping that started last night that was there this morning but has since resolved. pt still not able to tolerate any food. Patient is A&O x 1 baseline. pt is not at her baseline as per daughter  denies fever, chills, n/v

## 2017-06-07 NOTE — H&P ADULT - HISTORY OF PRESENT ILLNESS
88F PMH dementia, B/L hip replacements, Afib (not on AC due to previous ICH), symptomatic bradycardia (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), COPD, CKD, recently discharged on 5/31/17 for FTT  p/w difficultly swallowing. 88F PMH dementia, B/L hip replacements, Afib (not on AC due to previous ICH), symptomatic bradycardia (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), COPD, CKD, recently discharged on 5/31/17 for FTT  p/w difficultly swallowing.     In ED,   Labs showed ROMIE w/ Cr 1.45, WBC 15.4, lactate 1.9.  Pt was HTN on admission, but BP returned to normal with no pharmacologic intervention.   Tachypneic in ED started on 2L NC   No acute changes on CT head.     Vital Signs Last 24 Hrs  T(C): 36.4, Max: 37.8 (06-07 @ 11:44)  T(F): 97.5, Max: 100.1 (06-07 @ 11:44)  HR: 63 (61 - 68)  BP: 109/87 (109/87 - 211/109)  BP(mean): --  RR: 20 (12 - 24)  SpO2: 96% (93% - 100%) 89 y/o F PMHx dementia, B/L hip replacements, Afib (not on AC due to previous ICH), symptomatic bradycardia (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), COPD, CKD, recently discharged on 5/31/17 for FTT  p/w difficultly swallowing.   Hx obtained from daughter over the phone. Daughter reports difficulty feeding patient since discharge. Daughter reports having a constant struggle every day to get food and water in her mother. She has been using thickened water and pureed foods to feed her mother and usually is able to get the food in her mouth and sometimes it leaks out her mouth or the pt swallows it. Daughter reports her mother would cough more before using the thickened water. In addition, the patient has been "spacing-out about 90% of the time" during which she will not follow commands or swallow food. Per daughter, pt sometimes says a few words but has been nonverbal this week.   Patient also doesn't complain of any symptoms at baseline and the daughter didn't notice any new symptoms, other than decreased PO intake, since last admission.     In ED,   Labs showed ROMIE w/ Cr 1.45, WBC 15.4, lactate 1.9.  Pt was HTN on admission, but BP returned to normal with no pharmacologic intervention.   Tachypneic in ED started on 2L NC   No acute changes on CT head.     Vital Signs Last 24 Hrs  T(C): 36.4, Max: 37.8 (06-07 @ 11:44)  T(F): 97.5, Max: 100.1 (06-07 @ 11:44)  HR: 63 (61 - 68)  BP: 109/87 (109/87 - 211/109)  BP(mean): --  RR: 20 (12 - 24)  SpO2: 96% (93% - 100%) 89 y/o F PMHx dementia, B/L hip replacements, Afib (not on AC due to previous ICH), symptomatic bradycardia (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), COPD, CKD, recently discharged on 5/31/17 for FTT  p/w difficultly swallowing.   Hx obtained from daughter over the phone. Daughter reports difficulty feeding patient since discharge. Daughter reports having a constant struggle every day to get food and water in her mother. She has been using thickened water and pureed foods to feed her mother and usually is able to get the food in her mouth and sometimes it leaks out her mouth or the pt swallows it. Daughter reports her mother would cough more before using the thickened water. In addition, the patient has been "spacing-out about 90% of the time" during which she will not follow commands or swallow food. Per daughter, pt sometimes says a few words but has been nonverbal this week.   Patient also doesn't complain of any symptoms at baseline and the daughter didn't notice any new symptoms, other than decreased PO intake, since last admission.   Daughter would want everything done including intubation and chest compressions if needed.     In ED,   Labs showed ROMIE w/ Cr 1.45, WBC 15.4, lactate 1.9.  Pt was HTN on admission, but BP returned to normal with no pharmacologic intervention.   Tachypneic in ED started on 2L NC   No acute changes on CT head.     Vital Signs Last 24 Hrs  T(C): 36.4, Max: 37.8 (06-07 @ 11:44)  T(F): 97.5, Max: 100.1 (06-07 @ 11:44)  HR: 63 (61 - 68)  BP: 109/87 (109/87 - 211/109)  BP(mean): --  RR: 20 (12 - 24)  SpO2: 96% (93% - 100%)

## 2017-06-07 NOTE — ED PROVIDER NOTE - MEDICAL DECISION MAKING DETAILS
pt 87 yo f difficulty swallowing. will obtain labs, ua, ct head, cxr, admit for swallow study and hydration

## 2017-06-07 NOTE — H&P ADULT - PROBLEM SELECTOR PLAN 4
- ROMIE on CKD, likely pre-renal 2/2 to decreased PO intake and lasix use  - holding lasix and giving 50cc/hr LR for 10 hours

## 2017-06-07 NOTE — H&P ADULT - ASSESSMENT
87 y/o F PMHx dementia, B/L hip replacements, Afib (not on AC due to previous ICH), symptomatic bradycardia (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), COPD, CKD, recently discharged on 5/31/17 for FTT p/w worsening dysphagia.

## 2017-06-07 NOTE — H&P ADULT - PROBLEM SELECTOR PLAN 2
- Has increased O2 requirements, - Has increased O2 requirements  - concern for aspiration pna vs chemical pneumonitis   - aspiration precautions, elevated head of bed, chest PT  - afebrile, sent bcx and ucx in ED, no need for abx at this time

## 2017-06-07 NOTE — H&P ADULT - NSHPPHYSICALEXAM_GEN_ALL_CORE
PHYSICAL EXAM:    GENERAL: Lying in stretcher, tachypneic but in NAD    HEAD:  Normocephalic, atraumatic  EYES: EOMI, PERRLA  HEENT: Moist mucous membranes  NECK: Supple, No JVD  NERVOUS SYSTEM:  Alert & Oriented X0, non verbal, No moving extremities, resting tremor present   CHEST/LUNG: Bilateral rhonchi and wheezes, tachypneic   HEART: Regular rate and rhythm, no murmur   ABDOMEN: Soft, Nontender, Nondistended, Bowel sounds present  EXTREMITIES:   No clubbing, cyanosis, or edema, Bilateral LE rotated to R side   MUSCULOSKELTAL: No muscle tenderness, tenderness to palpation and movement of LUE   SKIN:  warm and dry, no rash

## 2017-06-07 NOTE — H&P ADULT - PROBLEM SELECTOR PLAN 5
- on digoxin at home, will home for today, can convert to IV if needed  - currently HD stable   - no a/c per prior ICH

## 2017-06-07 NOTE — H&P ADULT - PROBLEM SELECTOR PLAN 1
- NPO including meds for now   - changed meds to IV where possible   - aspiration precautions, elevated head of bed - NPO including meds for now until S&S eval   - changed meds to IV where possible   - aspiration precautions, elevated head of bed

## 2017-06-07 NOTE — H&P ADULT - PROBLEM SELECTOR PLAN 3
- s/p PPM, had interrogation last visit with no issues - s/p PPM, had PPM interrogation last visit with no issues  - currently stable

## 2017-06-07 NOTE — ED PROVIDER NOTE - ATTENDING CONTRIBUTION TO CARE
88f, pmhx ICH 1 year ago, since then dementia, also avi with ppm, htn. pt does not speak, history from daughter. has had decreased PO over the last week. today not taking PO at all. previously had swallow study and rec thickened purees but now can't even take that. daughter puts food in her mouth and pt spits it out. daughter note some left facial droop this morning that resolved. denies v/d. pt unable to express pain. daughter also notes pt is less responsive then usual. exam, mm dry, hs and lungs and abdomen normal. pt alert but does not speak or follow commands. no obvious focal neuro deficits. labs revealed hgb hemoconcentrated, kimi. ct head unchanged, other labs neg. will admit for rehydration. w/u of the not eating.

## 2017-06-07 NOTE — ED ADULT NURSE NOTE - OBJECTIVE STATEMENT
pt received to rm 7 with c/o AMS. pt a&ox1 non ambulatory. pt family states pt has AMS has gotten worse since las week. pt is shaking and has a rectal temp of 100.1. pt is non verbal. pt has pacemaker. pt bp is 211/109, MD aware. pt on monitor NSR noted. IV placed labs sent. will continue to monitor.

## 2017-06-07 NOTE — H&P ADULT - ATTENDING COMMENTS
Seen with primary team on 6/8/17  88F dementia, Afib (not on AC due to previous ICH), symptomatic bradycardia (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), COPD, CKD3, recently discharged on 5/31/17 for FTT  p/w difficultly swallowing and ROMIE on CKD3, leukocytosis  --ROMIE probably prerenal in setting of poor PO intake, IVF follow renal function  --leukocytosis with normal CXR, afebrile, possible aspiration pneumonitis, follow WBC  --Difficulty swallowing, speech/swallow eval and MBSS. NPO until assessed, convert meds to IV/[place]

## 2017-06-07 NOTE — H&P ADULT - NSHPLABSRESULTS_GEN_ALL_CORE
15.7   15.40 )-----------( 253      ( 2017 11:39 )             49.6           141  |  103  |  37<H>  ----------------------------<  123<H>  4.7   |  20<L>  |  1.45<H>    Ca    9.7      2017 11:39    TPro  8.5<H>  /  Alb  3.6  /  TBili  0.7  /  DBili  x   /  AST  19  /  ALT  13  /  AlkPhos  93                Urinalysis Basic - ( 2017 11:39 )    Color: YELLOW / Appearance: CLEAR / S.019 / pH: 6.0  Gluc: NEGATIVE / Ketone: NEGATIVE  / Bili: NEGATIVE / Urobili: NORMAL E.U.   Blood: TRACE / Protein: 30 / Nitrite: NEGATIVE   Leuk Esterase: NEGATIVE / RBC: 2-5 / WBC 0-2   Sq Epi: x / Non Sq Epi: x / Bacteria: x        PT/INR - ( 2017 11:39 )   PT: 10.9 SEC;   INR: 0.97          PTT - ( 2017 11:39 )  PTT:30.6 SEC    Lactate Trend            CAPILLARY BLOOD GLUCOSE  124 (2017 11:17)      CT BRAIN    No significant change when allowing for differences in   technique.

## 2017-06-07 NOTE — H&P ADULT - PROBLEM SELECTOR PROBLEM 2
Aspiration pneumonia, unspecified aspiration pneumonia type, unspecified laterality, unspecified part of lung

## 2017-06-07 NOTE — ED ADULT TRIAGE NOTE - CHIEF COMPLAINT QUOTE
As per daughter pt starting acting differently last night and developed left sided weakness this morning unable to swallow foods. Pt currently non verbal

## 2017-06-08 LAB
BASOPHILS # BLD AUTO: 0.06 K/UL — SIGNIFICANT CHANGE UP (ref 0–0.2)
BASOPHILS NFR BLD AUTO: 0.5 % — SIGNIFICANT CHANGE UP (ref 0–2)
BUN SERPL-MCNC: 34 MG/DL — HIGH (ref 7–23)
CALCIUM SERPL-MCNC: 9.2 MG/DL — SIGNIFICANT CHANGE UP (ref 8.4–10.5)
CHLORIDE SERPL-SCNC: 106 MMOL/L — SIGNIFICANT CHANGE UP (ref 98–107)
CO2 SERPL-SCNC: 17 MMOL/L — LOW (ref 22–31)
CREAT SERPL-MCNC: 1.26 MG/DL — SIGNIFICANT CHANGE UP (ref 0.5–1.3)
EOSINOPHIL # BLD AUTO: 0.07 K/UL — SIGNIFICANT CHANGE UP (ref 0–0.5)
EOSINOPHIL NFR BLD AUTO: 0.6 % — SIGNIFICANT CHANGE UP (ref 0–6)
GLUCOSE SERPL-MCNC: 103 MG/DL — HIGH (ref 70–99)
HCT VFR BLD CALC: 44.8 % — SIGNIFICANT CHANGE UP (ref 34.5–45)
HGB BLD-MCNC: 13.9 G/DL — SIGNIFICANT CHANGE UP (ref 11.5–15.5)
IMM GRANULOCYTES NFR BLD AUTO: 0.3 % — SIGNIFICANT CHANGE UP (ref 0–1.5)
LYMPHOCYTES # BLD AUTO: 18.5 % — SIGNIFICANT CHANGE UP (ref 13–44)
LYMPHOCYTES # BLD AUTO: 2.34 K/UL — SIGNIFICANT CHANGE UP (ref 1–3.3)
MAGNESIUM SERPL-MCNC: 2 MG/DL — SIGNIFICANT CHANGE UP (ref 1.6–2.6)
MCHC RBC-ENTMCNC: 27.9 PG — SIGNIFICANT CHANGE UP (ref 27–34)
MCHC RBC-ENTMCNC: 31 % — LOW (ref 32–36)
MCV RBC AUTO: 90 FL — SIGNIFICANT CHANGE UP (ref 80–100)
MONOCYTES # BLD AUTO: 1.5 K/UL — HIGH (ref 0–0.9)
MONOCYTES NFR BLD AUTO: 11.8 % — SIGNIFICANT CHANGE UP (ref 2–14)
NEUTROPHILS # BLD AUTO: 8.65 K/UL — HIGH (ref 1.8–7.4)
NEUTROPHILS NFR BLD AUTO: 68.3 % — SIGNIFICANT CHANGE UP (ref 43–77)
PHOSPHATE SERPL-MCNC: 3.5 MG/DL — SIGNIFICANT CHANGE UP (ref 2.5–4.5)
PLATELET # BLD AUTO: 194 K/UL — SIGNIFICANT CHANGE UP (ref 150–400)
PMV BLD: 11.1 FL — SIGNIFICANT CHANGE UP (ref 7–13)
POTASSIUM SERPL-MCNC: 4.9 MMOL/L — SIGNIFICANT CHANGE UP (ref 3.5–5.3)
POTASSIUM SERPL-SCNC: 4.9 MMOL/L — SIGNIFICANT CHANGE UP (ref 3.5–5.3)
RBC # BLD: 4.98 M/UL — SIGNIFICANT CHANGE UP (ref 3.8–5.2)
RBC # FLD: 16.8 % — HIGH (ref 10.3–14.5)
SODIUM SERPL-SCNC: 141 MMOL/L — SIGNIFICANT CHANGE UP (ref 135–145)
SPECIMEN SOURCE: SIGNIFICANT CHANGE UP
WBC # BLD: 12.66 K/UL — HIGH (ref 3.8–10.5)
WBC # FLD AUTO: 12.66 K/UL — HIGH (ref 3.8–10.5)

## 2017-06-08 PROCEDURE — 99223 1ST HOSP IP/OBS HIGH 75: CPT | Mod: AI,GC

## 2017-06-08 RX ORDER — SODIUM CHLORIDE 9 MG/ML
1000 INJECTION, SOLUTION INTRAVENOUS
Qty: 0 | Refills: 0 | Status: DISCONTINUED | OUTPATIENT
Start: 2017-06-08 | End: 2017-06-10

## 2017-06-08 RX ADMIN — Medication 3 MILLILITER(S): at 03:30

## 2017-06-08 RX ADMIN — Medication 3 MILLILITER(S): at 22:20

## 2017-06-08 RX ADMIN — SODIUM CHLORIDE 50 MILLILITER(S): 9 INJECTION, SOLUTION INTRAVENOUS at 23:24

## 2017-06-08 RX ADMIN — SODIUM CHLORIDE 50 MILLILITER(S): 9 INJECTION, SOLUTION INTRAVENOUS at 13:06

## 2017-06-08 RX ADMIN — Medication 3 MILLILITER(S): at 16:21

## 2017-06-08 RX ADMIN — Medication 3 MILLILITER(S): at 09:24

## 2017-06-08 RX ADMIN — HEPARIN SODIUM 5000 UNIT(S): 5000 INJECTION INTRAVENOUS; SUBCUTANEOUS at 21:36

## 2017-06-08 RX ADMIN — Medication 25 MICROGRAM(S): at 05:19

## 2017-06-08 RX ADMIN — HEPARIN SODIUM 5000 UNIT(S): 5000 INJECTION INTRAVENOUS; SUBCUTANEOUS at 13:06

## 2017-06-08 RX ADMIN — HEPARIN SODIUM 5000 UNIT(S): 5000 INJECTION INTRAVENOUS; SUBCUTANEOUS at 05:19

## 2017-06-08 NOTE — PROGRESS NOTE ADULT - ASSESSMENT
89 y/o F PMHx dementia, B/L hip replacements, Afib (not on AC due to previous ICH), symptomatic bradycardia (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), COPD, CKD, recently discharged on 5/31/17 for FTT p/w worsening dysphagia.

## 2017-06-08 NOTE — DIETITIAN INITIAL EVALUATION ADULT. - NS AS NUTRI INTERV COLLABORAT
1) Obtain MBS to help determine most appropriate diet consistency.                2)If PO contraindicated, based on pending swallow evaluation findings, suggest establishing long term [nutritional] goals of care w Pt/family               3)Obtain daily weights                4)Aspiration precautions

## 2017-06-08 NOTE — PROGRESS NOTE ADULT - ATTENDING COMMENTS
88F dementia, Afib (not on AC due to previous ICH), symptomatic bradycardia (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), COPD, CKD3, recently discharged on 5/31/17 for FTT  p/w difficultly swallowing and ROMIE on CKD3, leukocytosis  --ROMIE probably prerenal in setting of poor PO intake, IVF follow renal function  --leukocytosis with normal CXR, afebrile, possible aspiration pneumonitis, follow WBC  --Difficulty swallowing, speech/swallow eval and MBSS. NPO until assessed, convert meds to IV/[place]

## 2017-06-08 NOTE — PROGRESS NOTE ADULT - SUBJECTIVE AND OBJECTIVE BOX
Patient is a 88y old  Female who presents with a chief complaint of Dysphagia (2017 20:12)      SUBJECTIVE / OVERNIGHT EVENTS:    MEDICATIONS  (STANDING):  heparin  Injectable 5000Unit(s) SubCutaneous every 8 hours  levothyroxine Injectable 25MICROGram(s) IV Push daily  lactated ringers. 1000milliLiter(s) IV Continuous <Continuous>  ALBUTerol/ipratropium for Nebulization 3milliLiter(s) Nebulizer every 6 hours    MEDICATIONS  (PRN):      Vital Signs Last 24 Hrs  T(C): 36.4, Max: 37.8 (- @ 11:44)  HR: 60 (60 - 80)  BP: 135/94 (109/87 - 211/109)  RR: 17 (12 - 24)  SpO2: 98% (93% - 100%)  Wt(kg): --  CAPILLARY BLOOD GLUCOSE  124 (2017 11:17)    I&O's Summary      PHYSICAL EXAM:  GENERAL: NAD, well-developed  HEAD:  Atraumatic, Normocephalic  EYES: EOMI, PERRLA, conjunctiva and sclera clear  NECK: Supple, No JVD  CHEST/LUNG: Clear to auscultation bilaterally; No wheeze  HEART: Regular rate and rhythm; No murmurs, rubs, or gallops  ABDOMEN: Soft, Nontender, Nondistended; Bowel sounds present  EXTREMITIES:  2+ Peripheral Pulses, No clubbing, cyanosis, or edema  PSYCH: AAOx3  NEUROLOGY: non-focal  SKIN: No rashes or lesions    LABS:    Urinalysis Basic - ( 2017 11:39 )    Color: YELLOW / Appearance: CLEAR / S.019 / pH: 6.0  Gluc: NEGATIVE / Ketone: NEGATIVE  / Bili: NEGATIVE / Urobili: NORMAL E.U.   Blood: TRACE / Protein: 30 / Nitrite: NEGATIVE   Leuk Esterase: NEGATIVE / RBC: 2-5 / WBC 0-2   Sq Epi: x / Non Sq Epi: x / Bacteria: x        RADIOLOGY & ADDITIONAL TESTS:    Imaging Personally Reviewed:    Consultant(s) Notes Reviewed:      Care Discussed with Consultants/Other Providers: Patient is a 88y old  Female who presents with a chief complaint of Dysphagia (2017 20:12)      SUBJECTIVE / OVERNIGHT EVENTS:     MEDICATIONS  (STANDING):  heparin  Injectable 5000Unit(s) SubCutaneous every 8 hours  levothyroxine Injectable 25MICROGram(s) IV Push daily  lactated ringers. 1000milliLiter(s) IV Continuous <Continuous>  ALBUTerol/ipratropium for Nebulization 3milliLiter(s) Nebulizer every 6 hours    MEDICATIONS  (PRN):      Vital Signs Last 24 Hrs  T(C): 36.4, Max: 37.8 (- @ 11:44)  HR: 60 (60 - 80)  BP: 135/94 (109/87 - 211/109)  RR: 17 (12 - 24)  SpO2: 98% (93% - 100%)  Wt(kg): --  CAPILLARY BLOOD GLUCOSE  124 (2017 11:17)    I&O's Summary      PHYSICAL EXAM:  GENERAL: NAD, well-developed  HEAD:  Atraumatic, Normocephalic  EYES: EOMI, PERRLA, conjunctiva and sclera clear  NECK: Supple, No JVD  CHEST/LUNG: Clear to auscultation bilaterally; No wheeze  HEART: Regular rate and rhythm; No murmurs, rubs, or gallops  ABDOMEN: Soft, Nontender, Nondistended; Bowel sounds present  EXTREMITIES:  2+ Peripheral Pulses, No clubbing, cyanosis, or edema  PSYCH: AAOx3  NEUROLOGY: non-focal  SKIN: No rashes or lesions    LABS:    Urinalysis Basic - ( 2017 11:39 )    Color: YELLOW / Appearance: CLEAR / S.019 / pH: 6.0  Gluc: NEGATIVE / Ketone: NEGATIVE  / Bili: NEGATIVE / Urobili: NORMAL E.U.   Blood: TRACE / Protein: 30 / Nitrite: NEGATIVE   Leuk Esterase: NEGATIVE / RBC: 2-5 / WBC 0-2   Sq Epi: x / Non Sq Epi: x / Bacteria: x        RADIOLOGY & ADDITIONAL TESTS:    Imaging Personally Reviewed:    Consultant(s) Notes Reviewed:      Care Discussed with Consultants/Other Providers: Patient is a 88y old  Female who presents with a chief complaint of Dysphagia (2017 20:12)      SUBJECTIVE / OVERNIGHT EVENTS: No overnight events. Patient A&O x 1 this AM, at baseline. Denies pain, discomfort, SOB.    MEDICATIONS  (STANDING):  heparin  Injectable 5000Unit(s) SubCutaneous every 8 hours  levothyroxine Injectable 25MICROGram(s) IV Push daily  lactated ringers. 1000milliLiter(s) IV Continuous <Continuous>  ALBUTerol/ipratropium for Nebulization 3milliLiter(s) Nebulizer every 6 hours    MEDICATIONS  (PRN):      Vital Signs Last 24 Hrs  T(C): 36.4, Max: 37.8 (- @ 11:44)  HR: 60 (60 - 80)  BP: 135/94 (109/87 - 211/109)  RR: 17 (12 - 24)  SpO2: 98% (93% - 100%)  Wt(kg): --  CAPILLARY BLOOD GLUCOSE  124 (2017 11:17)    I&O's Summary      PHYSICAL EXAM:  GENERAL: NAD. Uneaten food in mouth  HEAD:  Atraumatic, Normocephalic  EYES: EOMI, PERRLA, conjunctiva and sclera clear  NECK: Supple, No JVD  CHEST/LUNG: Clear to auscultation bilaterally; No wheeze  HEART: Regular rate and rhythm  ABDOMEN: Soft, Nontender, Nondistended; Bowel sounds present  EXTREMITIES:  No clubbing, cyanosis  PSYCH: AAOx1  SKIN: No rashes or lesions    LABS:    Urinalysis Basic - ( 2017 11:39 )    Color: YELLOW / Appearance: CLEAR / S.019 / pH: 6.0  Gluc: NEGATIVE / Ketone: NEGATIVE  / Bili: NEGATIVE / Urobili: NORMAL E.U.   Blood: TRACE / Protein: 30 / Nitrite: NEGATIVE   Leuk Esterase: NEGATIVE / RBC: 2-5 / WBC 0-2   Sq Epi: x / Non Sq Epi: x / Bacteria: x      Radiology:  Xray L wrist  No dislocations, acute appearing fractures, or gross osseous or joint   deformities.    Amorphous intra-articular and periarticular calcificationsabout the   wrist and elbow compatible with chondrocalcinosis, most commonly seen in   association with CPPD.    Preserved joint spaces and no joint margin erosions.    Generalized osteopenia otherwise no discrete lytic or blastic lesions.    CXR  Lungs again underinflated.  Slightly hazy indistinct right CP angle could be due to overlying soft   tissues versus a small right pleural effusion. Sharp left CP angle.  Generalized mild interstitial prominence could reflect mild edema or   chronic interstitial disease. No focal patchy airspace consolidations or   pneumothorax.  Stable left chest wall dual-lead pacemaker and cardiac and mediastinal   silhouettes.  Trachea projects to right of midline but proportionate to degree of   patient rotation.  Generalized osteopenia and spinal degenerative changes again noted.

## 2017-06-08 NOTE — PROGRESS NOTE ADULT - PROBLEM SELECTOR PLAN 5
- on digoxin at home, will home for today, can convert to IV if needed.   - currently HD stable   - no a/c per prior ICH

## 2017-06-08 NOTE — SWALLOW BEDSIDE ASSESSMENT ADULT - SWALLOW EVAL: DIAGNOSIS
Patient presents with suspected oropharyngeal dysphagia characterized by adequate oral containment, slow bolus manipulation and transport for puree/honey thick liquids. There is laryngeal elevation upon palpation, suspect delayed initiation of the pharyngeal swallow. There were no overt signs of impaired airway protection for puree and honey thick liquid trials. Given history, patient will benefit a repeat Cinesophagram to objectively assess swallowing function.

## 2017-06-08 NOTE — SWALLOW BEDSIDE ASSESSMENT ADULT - COMMENTS
Dx- dysphagia w/ possible aspiration pna; Patient is an 87 y/o F PMHx dementia, B/L hip replacements, Afib (not on AC due to previous ICH), symptomatic bradycardia (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), COPD, CKD, recently discharged on 5/31/17 for FTT  p/w difficultly swallowing.     Of Note: Patient had a Cinesophagram back on 7/25/2016 with recommendations for puree and honey thick liquids. Patient was recently discharged from [facility] with recommendations for Puree and Honey Thick Liquids and repeat Cinesophagram.  Patient was discharged home prior to completing a repeat Cinesophagram on previous admission.     Patient seen at bedside, alert state. Patient's daughter (Maritza) is also present. Patient's daughter states that when she gave her mother thin liquids that she did exhibit coughing and when she utilized thick it powder it improved without coughing episodes (unclear if its nectar or honey consistency). Dx- dysphagia w/ possible aspiration pna; Patient is an 87 y/o F PMHx dementia, B/L hip replacements, Afib (not on AC due to previous ICH), symptomatic bradycardia (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), COPD, CKD, recently discharged on 5/31/17 for FTT  p/w difficultly swallowing.     Of Note: Patient had a Cinesophagram back on 7/25/2016 with recommendations for puree and honey thick liquids. Patient was recently discharged from [facility] (May 2017) with recommendations for Puree and Honey Thick Liquids and repeat Cinesophagram.  Patient was discharged home prior to completing a repeat Cinesophagram on previous admission; and was to return as an OutPatient.       Patient seen at bedside, alert state. Patient's daughter (Maritza) is also present. Patient's daughter states that when she gave her mother thin liquids that she did exhibit coughing and when she utilized thick it powder it improved without coughing episodes (unclear if its nectar or honey consistency).

## 2017-06-08 NOTE — PROGRESS NOTE ADULT - PROBLEM SELECTOR PLAN 1
- NPO including meds for now until S&S eval   - changed meds to IV where possible   - aspiration precautions, elevated head of bed

## 2017-06-08 NOTE — SWALLOW BEDSIDE ASSESSMENT ADULT - SWALLOW EVAL: RECOMMENDED FEEDING/EATING TECHNIQUES
oral hygiene/small sips/bites/crush medication (when feasible)/position upright (90 degrees)/check mouth frequently for oral residue/pocketing/allow for swallow between intakes/maintain upright posture during/after eating for 30 mins

## 2017-06-08 NOTE — PROGRESS NOTE ADULT - PROBLEM SELECTOR PLAN 4
Baseline Cr ~ .9 to 1  - ROMIE on CKD, likely pre-renal 2/2 to decreased PO intake and lasix use  - holding lasix and giving 50cc/hr LR for 10 hours Baseline Cr ~ .9 to 1  - ROMIE on CKD, likely pre-renal 2/2 to decreased PO intake and lasix use  - holding lasix and giving maintenance LR at 50 mL/hr

## 2017-06-08 NOTE — DIETITIAN INITIAL EVALUATION ADULT. - OTHER INFO
Pt. unable to provide nutrition Hx and is currently NPO... pending bedside swallow evaluation.   Verbalized to physician recommendation to obtain MBS, as based on prior admission swallow eval suggestion.  Per chart review, Pt. with suspected weight loss... noted as 191lbs (July 2016), 156.9lbs (May 2017) & currently 133.6lbs on this admission.  Per H&P, Pt. w decreased PO intake x ~1week.   No reported/noted nausea/vomiting/diarrhea/constipation at this time.  NKFA.   Also discussed consideration for goals of care discussion, w physician.

## 2017-06-08 NOTE — CHART NOTE - NSCHARTNOTEFT_GEN_A_CORE
NUTRITION SERVICES     Upon Nutritional Assessment by the Registered Dietitian your patient was determined to meet criteria/ has evidence of the following diagnosis/diagnoses:  [ ] Mild Protein Calorie Malnutrition   [X] Moderate Protein Calorie Malnutrition   [ ] Severe Protein Calorie Malnutrition   [ ] Unspecified Protein Calorie Malnutrition   [ ] Underweight / BMI <19  [ ] Morbid Obesity / BMI >40    Findings as based on:  •  Comprehensive nutritional assessment and consultation    Please refer to Initial Dietitian Evaluation via documents section of [employer] EMR for further recommendations.    Farheen Oro RDN, CDN   pager: [phone]

## 2017-06-08 NOTE — SWALLOW BEDSIDE ASSESSMENT ADULT - SWALLOW EVAL: ORAL MUSCULATURE
Unable to adequately assess oral mechanism as patient did not participate despite visual/verbal cues

## 2017-06-09 LAB
ALBUMIN SERPL ELPH-MCNC: 2.9 G/DL — LOW (ref 3.3–5)
ALP SERPL-CCNC: 76 U/L — SIGNIFICANT CHANGE UP (ref 40–120)
ALT FLD-CCNC: 10 U/L — SIGNIFICANT CHANGE UP (ref 4–33)
AST SERPL-CCNC: 16 U/L — SIGNIFICANT CHANGE UP (ref 4–32)
BACTERIA UR CULT: SIGNIFICANT CHANGE UP
BASOPHILS # BLD AUTO: 0.04 K/UL — SIGNIFICANT CHANGE UP (ref 0–0.2)
BASOPHILS NFR BLD AUTO: 0.3 % — SIGNIFICANT CHANGE UP (ref 0–2)
BILIRUB SERPL-MCNC: 0.5 MG/DL — SIGNIFICANT CHANGE UP (ref 0.2–1.2)
BUN SERPL-MCNC: 35 MG/DL — HIGH (ref 7–23)
CALCIUM SERPL-MCNC: 9.1 MG/DL — SIGNIFICANT CHANGE UP (ref 8.4–10.5)
CHLORIDE SERPL-SCNC: 107 MMOL/L — SIGNIFICANT CHANGE UP (ref 98–107)
CO2 SERPL-SCNC: 21 MMOL/L — LOW (ref 22–31)
CREAT SERPL-MCNC: 1.18 MG/DL — SIGNIFICANT CHANGE UP (ref 0.5–1.3)
EOSINOPHIL # BLD AUTO: 0.16 K/UL — SIGNIFICANT CHANGE UP (ref 0–0.5)
EOSINOPHIL NFR BLD AUTO: 1.3 % — SIGNIFICANT CHANGE UP (ref 0–6)
GLUCOSE SERPL-MCNC: 87 MG/DL — SIGNIFICANT CHANGE UP (ref 70–99)
HCT VFR BLD CALC: 40.9 % — SIGNIFICANT CHANGE UP (ref 34.5–45)
HGB BLD-MCNC: 12.4 G/DL — SIGNIFICANT CHANGE UP (ref 11.5–15.5)
IMM GRANULOCYTES NFR BLD AUTO: 0.4 % — SIGNIFICANT CHANGE UP (ref 0–1.5)
LYMPHOCYTES # BLD AUTO: 2.91 K/UL — SIGNIFICANT CHANGE UP (ref 1–3.3)
LYMPHOCYTES # BLD AUTO: 23.1 % — SIGNIFICANT CHANGE UP (ref 13–44)
MAGNESIUM SERPL-MCNC: 2 MG/DL — SIGNIFICANT CHANGE UP (ref 1.6–2.6)
MCHC RBC-ENTMCNC: 27.5 PG — SIGNIFICANT CHANGE UP (ref 27–34)
MCHC RBC-ENTMCNC: 30.3 % — LOW (ref 32–36)
MCV RBC AUTO: 90.7 FL — SIGNIFICANT CHANGE UP (ref 80–100)
MONOCYTES # BLD AUTO: 1.48 K/UL — HIGH (ref 0–0.9)
MONOCYTES NFR BLD AUTO: 11.7 % — SIGNIFICANT CHANGE UP (ref 2–14)
NEUTROPHILS # BLD AUTO: 7.98 K/UL — HIGH (ref 1.8–7.4)
NEUTROPHILS NFR BLD AUTO: 63.2 % — SIGNIFICANT CHANGE UP (ref 43–77)
PHOSPHATE SERPL-MCNC: 3.4 MG/DL — SIGNIFICANT CHANGE UP (ref 2.5–4.5)
PLATELET # BLD AUTO: 224 K/UL — SIGNIFICANT CHANGE UP (ref 150–400)
PMV BLD: 11.6 FL — SIGNIFICANT CHANGE UP (ref 7–13)
POTASSIUM SERPL-MCNC: 4.3 MMOL/L — SIGNIFICANT CHANGE UP (ref 3.5–5.3)
POTASSIUM SERPL-SCNC: 4.3 MMOL/L — SIGNIFICANT CHANGE UP (ref 3.5–5.3)
PROT SERPL-MCNC: 7.1 G/DL — SIGNIFICANT CHANGE UP (ref 6–8.3)
RBC # BLD: 4.51 M/UL — SIGNIFICANT CHANGE UP (ref 3.8–5.2)
RBC # FLD: 16.8 % — HIGH (ref 10.3–14.5)
SODIUM SERPL-SCNC: 144 MMOL/L — SIGNIFICANT CHANGE UP (ref 135–145)
WBC # BLD: 12.62 K/UL — HIGH (ref 3.8–10.5)
WBC # FLD AUTO: 12.62 K/UL — HIGH (ref 3.8–10.5)

## 2017-06-09 PROCEDURE — 74230 X-RAY XM SWLNG FUNCJ C+: CPT | Mod: 26

## 2017-06-09 PROCEDURE — 93971 EXTREMITY STUDY: CPT | Mod: 26

## 2017-06-09 RX ORDER — FUROSEMIDE 40 MG
1 TABLET ORAL
Qty: 6 | Refills: 0 | OUTPATIENT
Start: 2017-06-09 | End: 2017-07-19

## 2017-06-09 RX ADMIN — Medication 3 MILLILITER(S): at 22:03

## 2017-06-09 RX ADMIN — Medication 3 MILLILITER(S): at 16:33

## 2017-06-09 RX ADMIN — HEPARIN SODIUM 5000 UNIT(S): 5000 INJECTION INTRAVENOUS; SUBCUTANEOUS at 23:27

## 2017-06-09 RX ADMIN — Medication 3 MILLILITER(S): at 10:48

## 2017-06-09 RX ADMIN — HEPARIN SODIUM 5000 UNIT(S): 5000 INJECTION INTRAVENOUS; SUBCUTANEOUS at 14:53

## 2017-06-09 RX ADMIN — Medication 3 MILLILITER(S): at 04:34

## 2017-06-09 RX ADMIN — Medication 25 MICROGRAM(S): at 05:35

## 2017-06-09 RX ADMIN — HEPARIN SODIUM 5000 UNIT(S): 5000 INJECTION INTRAVENOUS; SUBCUTANEOUS at 05:35

## 2017-06-09 NOTE — DISCHARGE NOTE ADULT - PLAN OF CARE
Continue with specified diet, as recommended. Follow-up with your doctor. Recommended consistencies:  1.) Puree with Honey Thick Liquids with Feeding Assistance  2.) Feeding/Swallowing Guidelines: Sit upright, teaspoon amount at a time, allow patient to swallow prior to next presentation, provide teaspoon or small single cup sips of honey thick liquids; alternate with honey thick liquid wash to assist with oral and pharyngeal clearance.   3.) Aspiration Precautions  4.) Maintain Good Oral Hygiene Care    Recommended Feeding/Eating Techniques: maintain upright posture during/after eating for 30 mins; position upright (90 degrees) Follow-up with your doctor On arrival, your Creatinine was 1.45, this is elevated from your normal, indicating that your kidneys were functioning suboptimally. This was likely due to dehydration. Your kidney function improved with mild rehydration. Follow-up with your cardiologist

## 2017-06-09 NOTE — PROGRESS NOTE ADULT - SUBJECTIVE AND OBJECTIVE BOX
Patient is a 88y old  Female who presents with a chief complaint of Dysphagia (07 Jun 2017 20:12)      SUBJECTIVE / OVERNIGHT EVENTS: No overnight events.     MEDICATIONS  (STANDING):  heparin  Injectable 5000Unit(s) SubCutaneous every 8 hours  levothyroxine Injectable 25MICROGram(s) IV Push daily  ALBUTerol/ipratropium for Nebulization 3milliLiter(s) Nebulizer every 6 hours  lactated ringers. 1000milliLiter(s) IV Continuous <Continuous>    MEDICATIONS  (PRN):      Vital Signs Last 24 Hrs  T(C): 36.8, Max: 36.8 (06-08 @ 21:57)  HR: 64 (60 - 68)  BP: 141/74 (141/74 - 159/90)  RR: 20 (18 - 22)  SpO2: 98% (98% - 99%)  Wt(kg): --  CAPILLARY BLOOD GLUCOSE    I&O's Summary      PHYSICAL EXAM:  GENERAL: NAD, well-developed  HEAD:  Atraumatic, Normocephalic  EYES: EOMI, PERRLA, conjunctiva and sclera clear  NECK: Supple, No JVD  CHEST/LUNG: Clear to auscultation bilaterally; No wheeze  HEART: Regular rate and rhythm; No murmurs, rubs, or gallops  ABDOMEN: Soft, Nontender, Nondistended; Bowel sounds present  EXTREMITIES:  2+ Peripheral Pulses, No clubbing, cyanosis, or edema  PSYCH: AAOx3  NEUROLOGY: non-focal  SKIN: No rashes or lesions    LABS:                        12.4   12.62 )-----------( 224      ( 09 Jun 2017 05:00 )             40.9               Consultant(s) Notes Reviewed:  Speech and Swallow, Nutrition, dietician     Care Discussed with Consultants/Other Providers: Patient is a 88y old  Female who presents with a chief complaint of Dysphagia (07 Jun 2017 20:12)      SUBJECTIVE / OVERNIGHT EVENTS: No overnight events.     MEDICATIONS  (STANDING):  heparin  Injectable 5000Unit(s) SubCutaneous every 8 hours  levothyroxine Injectable 25MICROGram(s) IV Push daily  ALBUTerol/ipratropium for Nebulization 3milliLiter(s) Nebulizer every 6 hours  lactated ringers. 1000milliLiter(s) IV Continuous <Continuous>    MEDICATIONS  (PRN):      Vital Signs Last 24 Hrs  T(C): 36.8, Max: 36.8 (06-08 @ 21:57)  HR: 64 (60 - 68)  BP: 141/74 (141/74 - 159/90)  RR: 20 (18 - 22)  SpO2: 98% (98% - 99%)  Wt(kg): --  CAPILLARY BLOOD GLUCOSE    I&O's Summary      PHYSICAL EXAM:  GENERAL: NAD, well-developed  HEAD:  Atraumatic, Normocephalic  EYES: EOMI, PERRLA, conjunctiva and sclera clear  NECK: Supple, No JVD  CHEST/LUNG: Clear to auscultation bilaterally; No wheeze  HEART: Regular rate and rhythm; No murmurs, rubs, or gallops  ABDOMEN: Soft, Nontender, Nondistended; Bowel sounds present  EXTREMITIES:  2+ Peripheral Pulses, No clubbing, cyanosis, or edema  PSYCH: AAOx3  NEUROLOGY: non-focal  SKIN: No rashes or lesions    LABS:                        12.4   12.62 )-----------( 224      ( 09 Jun 2017 05:00 )             40.9       06-09    144  |  107  |  35<H>  ----------------------------<  87  4.3   |  21<L>  |  1.18    Ca    9.1      09 Jun 2017 05:00  Phos  3.4     06-09  Mg     2.0     06-09    TPro  7.1  /  Alb  2.9<L>  /  TBili  0.5  /  DBili  x   /  AST  16  /  ALT  10  /  AlkPhos  76  06-09          Consultant(s) Notes Reviewed:  Speech and Swallow, Nutrition, dietician     Care Discussed with Consultants/Other Providers: Patient is a 88y old  Female who presents with a chief complaint of Dysphagia (07 Jun 2017 20:12)      SUBJECTIVE / OVERNIGHT EVENTS: No overnight events.     MEDICATIONS  (STANDING):  heparin  Injectable 5000Unit(s) SubCutaneous every 8 hours  levothyroxine Injectable 25MICROGram(s) IV Push daily  ALBUTerol/ipratropium for Nebulization 3milliLiter(s) Nebulizer every 6 hours  lactated ringers. 1000milliLiter(s) IV Continuous <Continuous>    MEDICATIONS  (PRN):      Vital Signs Last 24 Hrs  T(C): 36.8, Max: 36.8 (06-08 @ 21:57)  HR: 64 (60 - 68)  BP: 141/74 (141/74 - 159/90)  RR: 20 (18 - 22)  SpO2: 98% (98% - 99%)  Wt(kg): --  CAPILLARY BLOOD GLUCOSE    I&O's Summary      PHYSICAL EXAM:  GENERAL: NAD, A&O x 1 (at baseline)  HEAD:  Atraumatic, Normocephalic  EYES: EOMI, PERRLA, conjunctiva and sclera clear  NECK: Supple, No JVD  CHEST/LUNG: lung sounds improved, no work of breathing  HEART: Regular rate and rhythm; No murmurs, rubs, or gallops  ABDOMEN: Soft, Nontender, Nondistended; Bowel sounds present  EXTREMITIES: No clubbing, cyanosis, or edema  PSYCH: AAOx3  NEUROLOGY: non-focal  SKIN: No rashes or lesions    LABS:                        12.4   12.62 )-----------( 224      ( 09 Jun 2017 05:00 )             40.9       06-09    144  |  107  |  35<H>  ----------------------------<  87  4.3   |  21<L>  |  1.18    Ca    9.1      09 Jun 2017 05:00  Phos  3.4     06-09  Mg     2.0     06-09    TPro  7.1  /  Alb  2.9<L>  /  TBili  0.5  /  DBili  x   /  AST  16  /  ALT  10  /  AlkPhos  76  06-09          Consultant(s) Notes Reviewed:  Speech and Swallow, Nutrition, dietician     Care Discussed with Consultants/Other Providers:

## 2017-06-09 NOTE — PROGRESS NOTE ADULT - PROBLEM SELECTOR PLAN 4
Baseline Cr ~ .9 to 1  - ROMIE on CKD, likely pre-renal 2/2 to decreased PO intake and lasix use  - holding lasix and giving maintenance LR at 50 mL/hr. Plan to discontinue fluids today in setting of diet.

## 2017-06-09 NOTE — PROGRESS NOTE ADULT - PROBLEM SELECTOR PLAN 2
- SpO2 98-99% w/ NC, will trial without O2 today  - Monitor for fever in setting of possible aspiration w/leukocytosis (downtrending) and slightly increased breathing rate and coarse breath sounds on admission. Abx if fever, but now holding.  - May have aspiration pneumonitis rather than PNA   - aspiration precautions, elevated head of bed, chest PT  - follow-up cultures

## 2017-06-09 NOTE — SWALLOW VFSS/MBS ASSESSMENT ADULT - ORAL PHASE
Reduced anterior - posterior transport/Delayed oral transit time Delayed oral transit time/Reduced anterior - posterior transport

## 2017-06-09 NOTE — DISCHARGE NOTE ADULT - MEDICATION SUMMARY - MEDICATIONS TO TAKE
I will START or STAY ON the medications listed below when I get home from the hospital:    aspirin 81 mg oral tablet, chewable  -- 1 tab(s) by mouth once a day  -- Indication: For Atrial fibrillation and flutter    famotidine 20 mg oral tablet  -- 1 tab(s) by mouth once a day  -- Indication: For GERD    levothyroxine 50 mcg (0.05 mg) oral tablet  -- 1 tab(s) by mouth once a day  -- Indication: For Hypothyroidism

## 2017-06-09 NOTE — DISCHARGE NOTE ADULT - MEDICATION SUMMARY - MEDICATIONS TO STOP TAKING
I will STOP taking the medications listed below when I get home from the hospital:    furosemide 40 mg oral tablet  -- 1 tab(s) by mouth once a day    digoxin 125 mcg (0.125 mg) oral tablet  -- 1 tab(s) by mouth once a day    Colace 100 mg oral capsule  -- 1 cap(s) by mouth 3 times a day

## 2017-06-09 NOTE — PROGRESS NOTE ADULT - ATTENDING COMMENTS
88F dementia, Afib (not on AC due to previous ICH), symptomatic bradycardia (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), COPD, CKD3, recently discharged on 5/31/17 for FTT  p/w difficultly swallowing and ROMIE on CKD3, leukocytosis  --ROMIE improved with IVF  --leukocytosis with normal CXR, afebrile, no infection source found  --MBSS demonstrates airway protection swallowing thickened liquids  --UE doppler for edematous left arm, elevate  --anticipate d/c home today  d/c time 45min

## 2017-06-09 NOTE — SWALLOW VFSS/MBS ASSESSMENT ADULT - COMMENTS
Dx- dysphagia w/ possible aspiration pna   Patient is an 87 y/o F PMHx dementia, B/L hip replacements, Afib (not on AC due to previous ICH), symptomatic bradycardia (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), COPD, CKD, recently discharged on 5/31/17 for FTT  p/w difficultly swallowing.

## 2017-06-09 NOTE — DISCHARGE NOTE ADULT - CARE PLAN
Principal Discharge DX:	Dysphagia, unspecified type  Goal:	Continue with specified diet, as recommended. Follow-up with your doctor.  Instructions for follow-up, activity and diet:	Recommended consistencies:  1.) Puree with Honey Thick Liquids with Feeding Assistance  2.) Feeding/Swallowing Guidelines: Sit upright, teaspoon amount at a time, allow patient to swallow prior to next presentation, provide teaspoon or small single cup sips of honey thick liquids; alternate with honey thick liquid wash to assist with oral and pharyngeal clearance.   3.) Aspiration Precautions  4.) Maintain Good Oral Hygiene Care    Recommended Feeding/Eating Techniques: maintain upright posture during/after eating for 30 mins; position upright (90 degrees)  Secondary Diagnosis:	ROMIE (acute kidney injury)  Goal:	Follow-up with your doctor  Instructions for follow-up, activity and diet:	On arrival, your Creatinine was 1.45, this is elevated from your normal, indicating that your kidneys were functioning suboptimally. This was likely due to dehydration. Your kidney function improved with mild rehydration.  Secondary Diagnosis:	Atrial fibrillation and flutter  Goal:	Follow-up with your cardiologist

## 2017-06-09 NOTE — DISCHARGE NOTE ADULT - HOSPITAL COURSE
HPI:  87 y/o F PMHx dementia, B/L hip replacements, Afib (not on AC due to previous ICH), symptomatic bradycardia (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), COPD, CKD, recently discharged on 5/31/17 for FTT  p/w difficultly swallowing.   Hx obtained from daughter over the phone. Daughter reports difficulty feeding patient since discharge. Daughter reports having a constant struggle every day to get food and water in her mother. She has been using thickened water and pureed foods to feed her mother and usually is able to get the food in her mouth and sometimes it leaks out her mouth or the pt swallows it. Daughter reports her mother would cough more before using the thickened water. In addition, the patient has been "spacing-out about 90% of the time" during which she will not follow commands or swallow food. Per daughter, pt sometimes says a few words but has been nonverbal this week.   Patient also doesn't complain of any symptoms at baseline and the daughter didn't notice any new symptoms, other than decreased PO intake, since last admission.   Daughter would want everything done including intubation and chest compressions if needed.     In ED,   Labs showed ROMIE w/ Cr 1.45, WBC 15.4, lactate 1.9.  Pt was HTN on admission, but BP returned to normal with no pharmacologic intervention.   Tachypneic in ED started on 2L NC   No acute changes on CT head.   CXR showed no consolidations    Hospital Course:  Bcx   Xray L wrist  No dislocations, acute appearing fractures, or gross osseous or joint   deformities.    Amorphous intra-articular and periarticular calcificationsabout the   wrist and elbow compatible with chondrocalcinosis, most commonly seen in   association with CPPD.    Preserved joint spaces and no joint margin erosions.    Generalized osteopenia otherwise no discrete lytic or blastic lesions.    CXR  Lungs again underinflated.  Slightly hazy indistinct right CP angle could be due to overlying soft   tissues versus a small right pleural effusion. Sharp left CP angle.  Generalized mild interstitial prominence could reflect mild edema or   chronic interstitial disease. No focal patchy airspace consolidations or   pneumothorax.  Stable left chest wall dual-lead pacemaker and cardiac and mediastinal   silhouettes.  Trachea projects to right of midline but proportionate to degree of   patient rotation.  Generalized osteopenia and spinal degenerative changes again noted.    Assessment and Plan:   · Assessment		  87 y/o F PMHx dementia, B/L hip replacements, Afib (not on AC due to previous ICH), symptomatic bradycardia (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), COPD, CKD, recently discharged on 5/31/17 for FTT p/w worsening dysphagia.       Dysphagia, unspecified type.  Plan: - NPO including meds for now until S&S eval   - changed meds to IV where possible   - aspiration precautions, elevated head of bed    Aspiration pneumonia, unspecified aspiration pneumonia type, unspecified laterality, unspecified part of lung.  Plan: - Has increased O2 requirements  - concern for aspiration pna vs chemical pneumonitis   - aspiration precautions, elevated head of bed, chest PT  - afebrile, sent bcx and ucx in ED, no need for abx at this time    Symptomatic bradycardia.  Plan: - s/p PPM, had PPM interrogation last visit with no issues  - currently stable    ROMIE (acute kidney injury).  Plan: Baseline Cr ~ .9 to 1  - ROMIE on CKD, likely pre-renal 2/2 to decreased PO intake and lasix use  - holding lasix and giving maintenance LR at 50 mL/hr    Atrial fibrillation and flutter.  Plan: - on digoxin at home, will home for today, can convert to IV if needed.   - currently HD stable   - no a/c per prior ICH  Wrist pain, left. Plan: - Pain on passive motion of wrist and palpation  - Xrays showed no acute fracture or injury  Prophylactic measure.  Plan: - DVT ppx- HSQ  Nutrition, metabolism, and development symptoms.  Plan: - NPO for now, until S&S HPI:  89 y/o F PMHx dementia, B/L hip replacements, Afib (not on AC due to previous ICH), symptomatic bradycardia (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), COPD, CKD, recently discharged on 5/31/17 for FTT  p/w difficultly swallowing.   Hx obtained from daughter over the phone. Daughter reports difficulty feeding patient since discharge. Daughter reports having a constant struggle every day to get food and water in her mother. She has been using thickened water and pureed foods to feed her mother and usually is able to get the food in her mouth and sometimes it leaks out her mouth or the pt swallows it. Daughter reports her mother would cough more before using the thickened water. In addition, the patient has been "spacing-out about 90% of the time" during which she will not follow commands or swallow food. Per daughter, pt sometimes says a few words but has been nonverbal this week.   Patient also doesn't complain of any symptoms at baseline and the daughter didn't notice any new symptoms, other than decreased PO intake, since last admission.   Daughter would want everything done including intubation and chest compressions if needed.      In ED,   Labs showed ROMIE w/ Cr 1.45, WBC 15.4, lactate 1.9.  Pt was HTN on admission, but BP returned to normal with no pharmacologic intervention.   Tachypneic in ED started on 2L NC   No acute changes on CT head.   CXR showed no consolidations    Hospital Course:  Bcx   Xray L wrist  No dislocations, acute appearing fractures, or gross osseous or joint   deformities.    Amorphous intra-articular and periarticular calcificationsabout the   wrist and elbow compatible with chondrocalcinosis, most commonly seen in   association with CPPD.    Preserved joint spaces and no joint margin erosions.    Generalized osteopenia otherwise no discrete lytic or blastic lesions.    CXR  Lungs again underinflated.  Slightly hazy indistinct right CP angle could be due to overlying soft   tissues versus a small right pleural effusion. Sharp left CP angle.  Generalized mild interstitial prominence could reflect mild edema or   chronic interstitial disease. No focal patchy airspace consolidations or   pneumothorax.  Stable left chest wall dual-lead pacemaker and cardiac and mediastinal   silhouettes.  Trachea projects to right of midline but proportionate to degree of   patient rotation.  Generalized osteopenia and spinal degenerative changes again noted.    Assessment and Plan:   · Assessment		  89 y/o F PMHx dementia, B/L hip replacements, Afib (not on AC due to previous ICH), symptomatic bradycardia (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), COPD, CKD, recently discharged on 5/31/17 for FTT p/w worsening dysphagia.       Dysphagia, unspecified type.  Plan: - NPO including meds for now until S&S eval   - changed meds to IV where possible   - aspiration precautions, elevated head of bed    Aspiration pneumonia, unspecified aspiration pneumonia type, unspecified laterality, unspecified part of lung.  Plan: - Has increased O2 requirements  - concern for aspiration pna vs chemical pneumonitis   - aspiration precautions, elevated head of bed, chest PT  - afebrile, sent bcx and ucx in ED, no need for abx at this time    Symptomatic bradycardia.  Plan: - s/p PPM, had PPM interrogation last visit with no issues  - currently stable    ROMIE (acute kidney injury).  Plan: Baseline Cr ~ .9 to 1  - ROMIE on CKD, likely pre-renal 2/2 to decreased PO intake and lasix use  - holding lasix and giving maintenance LR at 50 mL/hr    Atrial fibrillation and flutter.  Plan: - on digoxin at home, will home for today, can convert to IV if needed.   - currently HD stable   - no a/c per prior ICH  Wrist pain, left. Plan: - Pain on passive motion of wrist and palpation  - Xrays showed no acute fracture or injury  Prophylactic measure.  Plan: - DVT ppx- HSQ  Nutrition, metabolism, and development symptoms.  Plan: - NPO for now, until S&S HPI:  89 y/o F PMHx dementia, B/L hip replacements, Afib (not on AC due to previous ICH), symptomatic bradycardia (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), COPD, CKD, recently discharged on 5/31/17 for FTT  p/w difficultly swallowing.   Hx obtained from daughter over the phone. Daughter reports difficulty feeding patient since discharge. Daughter reports having a constant struggle every day to get food and water in her mother. She has been using thickened water and pureed foods to feed her mother and usually is able to get the food in her mouth and sometimes it leaks out her mouth or the pt swallows it. Daughter reports her mother would cough more before using the thickened water. In addition, the patient has been "spacing-out about 90% of the time" during which she will not follow commands or swallow food. Per daughter, pt sometimes says a few words but has been nonverbal this week.   Patient also doesn't complain of any symptoms at baseline and the daughter didn't notice any new symptoms, other than decreased PO intake, since last admission.   Daughter would want everything done including intubation and chest compressions if needed.      In ED,   Labs showed ROMIE w/ Cr 1.45, WBC 15.4, lactate 1.9.  Pt was HTN on admission, but BP returned to normal with no pharmacologic intervention.   Tachypneic in ED started on 2L NC   No acute changes on CT head.   CXR showed no consolidations    Hospital Course:  Bcx and UA were drawn and showed no growth. Patient had an x-ray of the left wrist 2/2 pain which demonstrated no acute frx or dislocation with some possible CPPD and osteopenia. Patient was monitored for fever, infectious symptoms, or SOB during admission and none occurred. Patient was kept NPO until seen by speech and swallow, with recommendation for a pureed diet with honey thickened liquids. Aspiration precautions given. Patient had a modified barium swallow, with recommendation to continue pureed diet with honey thickened liquids. Patient's ROMIE improved with light fluids and resuming diet. Digoxin was held 2/2 age, patient did not have rapid HR during admission. No record was found in outpatient records of patient being on digoxin.  Patient did not have anticoagulation 2/2 prior ICH. Patient had no prior services and was discharged home with new feeding recommendations.

## 2017-06-09 NOTE — DISCHARGE NOTE ADULT - CARE PROVIDER_API CALL
Mahogany Sanchez (MD), Cardiovascular Disease  [address] [place] [address]  Phone: [phone]  Fax: [phone]

## 2017-06-09 NOTE — SWALLOW VFSS/MBS ASSESSMENT ADULT - RECOMMENDED CONSISTENCY
1.) Puree with Honey Thick Liquids with Feeding Assistance  2.) Feeding/Swallowing Guidelines: Sit upright, teaspoon amount at a time, allow patient to swallow prior to next presentation, provide teaspoon or small single cup sips of honey thick liquids; alternate with honey thick liquid wash to assist with oral and pharyngeal clearance.   3.) Aspiration Precautions  4.) Maintain Good Oral Hygiene Care

## 2017-06-09 NOTE — SWALLOW VFSS/MBS ASSESSMENT ADULT - ROSENBEK'S PENETRATION ASPIRATION SCALE
(1) no aspiration, contrast does not enter airway (3) contrast remains above the vocal cords, visible residue remains (penetration)

## 2017-06-09 NOTE — DISCHARGE NOTE ADULT - PATIENT PORTAL LINK FT
“You can access the [employer] Patient Portal, offered by [facility], by registering with the following website: [email or web address]”

## 2017-06-09 NOTE — PROGRESS NOTE ADULT - PROBLEM SELECTOR PLAN 1
- On dysphagia 1 pureed honey thick diet  - IV meds where possible for now   - aspiration precautions, elevated head of bed Patient seen by speech and swallow. To continue with dysphagia 1 pureed honey thick diet  - IV meds where possible for now   - aspiration precautions, elevated head of bed

## 2017-06-09 NOTE — SWALLOW VFSS/MBS ASSESSMENT ADULT - DIAGNOSTIC IMPRESSIONS
Patient presents with mild oral and moderate pharyngeal stage dysphagia. The Oral Stage is characterized by adequate oral containment, slow bolus manipulation, slow tongue motion with slow anterior to posterior transfer of the bolus; piecemeal deglutition. There is trace/mild oral clearance deficit located lining along tongue surface post swallow. The Pharyngeal Stage is characterized by delayed initiation of the pharyngeal swallow (bolus head is at the pyriforms for thin liquid/nectar thick liquids), reduced laryngeal elevation, reduced tongue base retraction resulting in mild to moderate vallecular residue post primary swallow, and adequate pharyngeal constriction. There is mild to moderate pharyngeal clearance deficit located primarily in the vallecular space post primary swallow. Patient produces an intermittent spontaneous reswallow to assist with partial clearance.  There was Laryngeal Penetration during the swallow for Nectar thick liquids and Thin liquids without retrieval leaving residue in the Laryngeal Vestibule. Patient is not sensate to the Laryngeal Penetration given no reflexive cough response.  There was No Aspiration observed before, during or after the swallow for puree/honey thick liquids.

## 2017-06-09 NOTE — SWALLOW VFSS/MBS ASSESSMENT ADULT - PHARYNGEAL PHASE COMMENTS
delayed initiation of the pharyngeal swallow, reduced laryngeal elevation, reduced tongue base retraction, adequate pharyngeal constriction

## 2017-06-09 NOTE — PROGRESS NOTE ADULT - PROBLEM SELECTOR PLAN 5
- Currently holding home Dig, can convert to IV if needed.  - Hx symptomatic bradycardia, and syncope w/metoprolol   - currently HD stable   - no a/c per prior ICH

## 2017-06-10 VITALS
OXYGEN SATURATION: 99 % | TEMPERATURE: 98 F | HEART RATE: 71 BPM | DIASTOLIC BLOOD PRESSURE: 67 MMHG | RESPIRATION RATE: 17 BRPM | SYSTOLIC BLOOD PRESSURE: 164 MMHG

## 2017-06-10 DIAGNOSIS — M79.89 OTHER SPECIFIED SOFT TISSUE DISORDERS: ICD-10-CM

## 2017-06-10 RX ADMIN — HEPARIN SODIUM 5000 UNIT(S): 5000 INJECTION INTRAVENOUS; SUBCUTANEOUS at 05:26

## 2017-06-10 RX ADMIN — Medication 3 MILLILITER(S): at 04:41

## 2017-06-10 NOTE — PROGRESS NOTE ADULT - SUBJECTIVE AND OBJECTIVE BOX
Patient is a 88y old  Female who presents with a chief complaint of Dysphagia (09 Jun 2017 13:05)      SUBJECTIVE / OVERNIGHT EVENTS: No events overnight. Patient was supposed to leave at 5 pm by ambulette. Ambulette arrived at 10 pm and family refused to accept patient saying it was too late.    MEDICATIONS  (STANDING):  heparin  Injectable 5000Unit(s) SubCutaneous every 8 hours  levothyroxine Injectable 25MICROGram(s) IV Push daily  ALBUTerol/ipratropium for Nebulization 3milliLiter(s) Nebulizer every 6 hours  lactated ringers. 1000milliLiter(s) IV Continuous <Continuous>    MEDICATIONS  (PRN):      Vital Signs Last 24 Hrs  T(C): 36.7, Max: 36.9 (06-09 @ 14:38)  HR: 71 (60 - 73)  BP: 164/67 (147/99 - 164/67)  RR: 17 (17 - 20)  SpO2: 99% (94% - 100%)  Wt(kg): --  CAPILLARY BLOOD GLUCOSE    I&O's Summary      PHYSICAL EXAM:  GENERAL: NAD, A&O x 1 (at baseline)  HEAD:  Atraumatic, Normocephalic  EYES: EOMI, PERRLA, conjunctiva and sclera clear  NECK: Supple, No JVD  CHEST/LUNG: lung sounds improved, no work of breathing  HEART: Regular rate and rhythm; No murmurs, rubs, or gallops  ABDOMEN: Soft, Nontender, Nondistended; Bowel sounds present  EXTREMITIES: Left arm edema, c/w yesterday, and eschar on L hand from likely prior IV. No clubbing, cyanosis.  PSYCH: AAOx3  NEUROLOGY: non-focal  SKIN: No rashes or lesions    LABS:              No labs      Consultant(s) Notes Reviewed:  Speech and Swallow    Care Discussed with Consultants/Other Providers: Speech and swallow

## 2017-06-10 NOTE — PROVIDER CONTACT NOTE (OTHER) - ASSESSMENT
elevated blood pressure
/67, HR 71. No acute distress noted.
Levothyroxine IVP not given. MD aware and notified. Pt. has no IV access because pt. was suppose to be discharged yesterday evening. Pt. will be discharged today at 9am.
Pt is non-verbal

## 2017-06-10 NOTE — PROVIDER CONTACT NOTE (OTHER) - ACTION/TREATMENT ORDERED:
elevated blood pressure, MD notified
MD notified and aware. Ok to hold medication.
Continue to monitor. No interventions at this time.
continue to monitor

## 2017-06-10 NOTE — PROGRESS NOTE ADULT - PROBLEM SELECTOR PLAN 2
- Doppler negative for any dvt.  - Likely 2/2 small clot in superficial veins s/p IV/blood draw  - Recommend arm elevation

## 2017-06-10 NOTE — PROGRESS NOTE ADULT - PROBLEM SELECTOR PLAN 1
Patient seen by speech and swallow. To continue with dysphagia 1 pureed honey thick diet  - IV meds where possible for now   - aspiration precautions, elevated head of bed

## 2017-06-10 NOTE — PROVIDER CONTACT NOTE (OTHER) - RECOMMENDATIONS
Continue to monitor. MD notified and aware.
continue to assess
MD notified and aware. Ok to hold medication.

## 2017-06-12 LAB
BACTERIA BLD CULT: SIGNIFICANT CHANGE UP
BACTERIA BLD CULT: SIGNIFICANT CHANGE UP

## 2017-06-21 ENCOUNTER — INPATIENT (INPATIENT)
Facility: HOSPITAL | Age: 82
LOS: 2 days | Discharge: HOSPICE | End: 2017-06-24
Attending: HOSPITALIST | Admitting: HOSPITALIST
Payer: MEDICARE

## 2017-06-21 VITALS
OXYGEN SATURATION: 98 % | HEART RATE: 71 BPM | SYSTOLIC BLOOD PRESSURE: 151 MMHG | RESPIRATION RATE: 17 BRPM | TEMPERATURE: 98 F | DIASTOLIC BLOOD PRESSURE: 122 MMHG

## 2017-06-21 DIAGNOSIS — Z98.89 OTHER SPECIFIED POSTPROCEDURAL STATES: Chronic | ICD-10-CM

## 2017-06-21 DIAGNOSIS — N18.3 CHRONIC KIDNEY DISEASE, STAGE 3 (MODERATE): ICD-10-CM

## 2017-06-21 DIAGNOSIS — K92.2 GASTROINTESTINAL HEMORRHAGE, UNSPECIFIED: ICD-10-CM

## 2017-06-21 DIAGNOSIS — I10 ESSENTIAL (PRIMARY) HYPERTENSION: ICD-10-CM

## 2017-06-21 DIAGNOSIS — R00.1 BRADYCARDIA, UNSPECIFIED: ICD-10-CM

## 2017-06-21 DIAGNOSIS — Z29.9 ENCOUNTER FOR PROPHYLACTIC MEASURES, UNSPECIFIED: ICD-10-CM

## 2017-06-21 DIAGNOSIS — I48.91 UNSPECIFIED ATRIAL FIBRILLATION: ICD-10-CM

## 2017-06-21 DIAGNOSIS — Z96.643 PRESENCE OF ARTIFICIAL HIP JOINT, BILATERAL: Chronic | ICD-10-CM

## 2017-06-21 LAB
ALBUMIN SERPL ELPH-MCNC: 3.1 G/DL — LOW (ref 3.3–5)
ALP SERPL-CCNC: 90 U/L — SIGNIFICANT CHANGE UP (ref 40–120)
ALT FLD-CCNC: 13 U/L — SIGNIFICANT CHANGE UP (ref 4–33)
AST SERPL-CCNC: 31 U/L — SIGNIFICANT CHANGE UP (ref 4–32)
BASE EXCESS BLDV CALC-SCNC: -0.2 MMOL/L — SIGNIFICANT CHANGE UP
BASOPHILS # BLD AUTO: 0.04 K/UL — SIGNIFICANT CHANGE UP (ref 0–0.2)
BASOPHILS NFR BLD AUTO: 0.4 % — SIGNIFICANT CHANGE UP (ref 0–2)
BILIRUB SERPL-MCNC: 0.4 MG/DL — SIGNIFICANT CHANGE UP (ref 0.2–1.2)
BLD GP AB SCN SERPL QL: NEGATIVE — SIGNIFICANT CHANGE UP
BLOOD GAS VENOUS - CREATININE: 1.4 MG/DL — HIGH (ref 0.5–1.3)
BUN SERPL-MCNC: 26 MG/DL — HIGH (ref 7–23)
BUN SERPL-MCNC: 27 MG/DL — HIGH (ref 7–23)
CALCIUM SERPL-MCNC: 7.6 MG/DL — LOW (ref 8.4–10.5)
CALCIUM SERPL-MCNC: 9.3 MG/DL — SIGNIFICANT CHANGE UP (ref 8.4–10.5)
CHLORIDE BLDV-SCNC: 112 MMOL/L — HIGH (ref 96–108)
CHLORIDE SERPL-SCNC: 103 MMOL/L — SIGNIFICANT CHANGE UP (ref 98–107)
CHLORIDE SERPL-SCNC: 109 MMOL/L — HIGH (ref 98–107)
CO2 SERPL-SCNC: 17 MMOL/L — LOW (ref 22–31)
CO2 SERPL-SCNC: 21 MMOL/L — LOW (ref 22–31)
CREAT SERPL-MCNC: 1.35 MG/DL — HIGH (ref 0.5–1.3)
CREAT SERPL-MCNC: 1.45 MG/DL — HIGH (ref 0.5–1.3)
EOSINOPHIL # BLD AUTO: 0.27 K/UL — SIGNIFICANT CHANGE UP (ref 0–0.5)
EOSINOPHIL NFR BLD AUTO: 2.6 % — SIGNIFICANT CHANGE UP (ref 0–6)
GAS PNL BLDV: 134 MMOL/L — LOW (ref 136–146)
GLUCOSE BLDV-MCNC: 89 — SIGNIFICANT CHANGE UP (ref 70–99)
GLUCOSE SERPL-MCNC: 601 MG/DL — CRITICAL HIGH (ref 70–99)
GLUCOSE SERPL-MCNC: 90 MG/DL — SIGNIFICANT CHANGE UP (ref 70–99)
HCO3 BLDV-SCNC: 24 MMOL/L — SIGNIFICANT CHANGE UP (ref 20–27)
HCT VFR BLD CALC: 39.2 % — SIGNIFICANT CHANGE UP (ref 34.5–45)
HCT VFR BLD CALC: 41.7 % — SIGNIFICANT CHANGE UP (ref 34.5–45)
HCT VFR BLDV CALC: 36.8 % — SIGNIFICANT CHANGE UP (ref 34.5–45)
HGB BLD-MCNC: 12.2 G/DL — SIGNIFICANT CHANGE UP (ref 11.5–15.5)
HGB BLD-MCNC: 12.9 G/DL — SIGNIFICANT CHANGE UP (ref 11.5–15.5)
HGB BLDV-MCNC: 12 G/DL — SIGNIFICANT CHANGE UP (ref 11.5–15.5)
IMM GRANULOCYTES NFR BLD AUTO: 0.3 % — SIGNIFICANT CHANGE UP (ref 0–1.5)
LACTATE BLDV-MCNC: 1.6 MMOL/L — SIGNIFICANT CHANGE UP (ref 0.5–2)
LYMPHOCYTES # BLD AUTO: 3.25 K/UL — SIGNIFICANT CHANGE UP (ref 1–3.3)
LYMPHOCYTES # BLD AUTO: 31.5 % — SIGNIFICANT CHANGE UP (ref 13–44)
MCHC RBC-ENTMCNC: 27.8 PG — SIGNIFICANT CHANGE UP (ref 27–34)
MCHC RBC-ENTMCNC: 27.8 PG — SIGNIFICANT CHANGE UP (ref 27–34)
MCHC RBC-ENTMCNC: 30.9 % — LOW (ref 32–36)
MCHC RBC-ENTMCNC: 31.1 % — LOW (ref 32–36)
MCV RBC AUTO: 89.3 FL — SIGNIFICANT CHANGE UP (ref 80–100)
MCV RBC AUTO: 89.9 FL — SIGNIFICANT CHANGE UP (ref 80–100)
MONOCYTES # BLD AUTO: 0.67 K/UL — SIGNIFICANT CHANGE UP (ref 0–0.9)
MONOCYTES NFR BLD AUTO: 6.5 % — SIGNIFICANT CHANGE UP (ref 2–14)
NEUTROPHILS # BLD AUTO: 6.07 K/UL — SIGNIFICANT CHANGE UP (ref 1.8–7.4)
NEUTROPHILS NFR BLD AUTO: 58.7 % — SIGNIFICANT CHANGE UP (ref 43–77)
OB PNL STL: POSITIVE — SIGNIFICANT CHANGE UP
PCO2 BLDV: 41 MMHG — SIGNIFICANT CHANGE UP (ref 41–51)
PH BLDV: 7.39 PH — SIGNIFICANT CHANGE UP (ref 7.32–7.43)
PLATELET # BLD AUTO: 360 K/UL — SIGNIFICANT CHANGE UP (ref 150–400)
PLATELET # BLD AUTO: 395 K/UL — SIGNIFICANT CHANGE UP (ref 150–400)
PMV BLD: 10.6 FL — SIGNIFICANT CHANGE UP (ref 7–13)
PMV BLD: 10.7 FL — SIGNIFICANT CHANGE UP (ref 7–13)
PO2 BLDV: 49 MMHG — HIGH (ref 35–40)
POTASSIUM BLDV-SCNC: 4.1 MMOL/L — SIGNIFICANT CHANGE UP (ref 3.4–4.5)
POTASSIUM SERPL-MCNC: 3.7 MMOL/L — SIGNIFICANT CHANGE UP (ref 3.5–5.3)
POTASSIUM SERPL-MCNC: 5.5 MMOL/L — HIGH (ref 3.5–5.3)
POTASSIUM SERPL-SCNC: 3.7 MMOL/L — SIGNIFICANT CHANGE UP (ref 3.5–5.3)
POTASSIUM SERPL-SCNC: 5.5 MMOL/L — HIGH (ref 3.5–5.3)
PROT SERPL-MCNC: 7.6 G/DL — SIGNIFICANT CHANGE UP (ref 6–8.3)
RBC # BLD: 4.39 M/UL — SIGNIFICANT CHANGE UP (ref 3.8–5.2)
RBC # BLD: 4.64 M/UL — SIGNIFICANT CHANGE UP (ref 3.8–5.2)
RBC # FLD: 16.5 % — HIGH (ref 10.3–14.5)
RBC # FLD: 16.6 % — HIGH (ref 10.3–14.5)
RH IG SCN BLD-IMP: POSITIVE — SIGNIFICANT CHANGE UP
RH IG SCN BLD-IMP: POSITIVE — SIGNIFICANT CHANGE UP
SAO2 % BLDV: 79.8 % — SIGNIFICANT CHANGE UP (ref 60–85)
SODIUM SERPL-SCNC: 139 MMOL/L — SIGNIFICANT CHANGE UP (ref 135–145)
SODIUM SERPL-SCNC: 141 MMOL/L — SIGNIFICANT CHANGE UP (ref 135–145)
WBC # BLD: 10.33 K/UL — SIGNIFICANT CHANGE UP (ref 3.8–10.5)
WBC # BLD: 14 K/UL — HIGH (ref 3.8–10.5)
WBC # FLD AUTO: 10.33 K/UL — SIGNIFICANT CHANGE UP (ref 3.8–10.5)
WBC # FLD AUTO: 14 K/UL — HIGH (ref 3.8–10.5)

## 2017-06-21 PROCEDURE — 74176 CT ABD & PELVIS W/O CONTRAST: CPT | Mod: 26

## 2017-06-21 PROCEDURE — 71010: CPT | Mod: 26

## 2017-06-21 PROCEDURE — 99223 1ST HOSP IP/OBS HIGH 75: CPT | Mod: AI

## 2017-06-21 RX ORDER — SODIUM CHLORIDE 9 MG/ML
1000 INJECTION, SOLUTION INTRAVENOUS
Qty: 0 | Refills: 0 | Status: DISCONTINUED | OUTPATIENT
Start: 2017-06-21 | End: 2017-06-24

## 2017-06-21 RX ORDER — PANTOPRAZOLE SODIUM 20 MG/1
80 TABLET, DELAYED RELEASE ORAL ONCE
Qty: 0 | Refills: 0 | Status: COMPLETED | OUTPATIENT
Start: 2017-06-21 | End: 2017-06-21

## 2017-06-21 RX ORDER — PANTOPRAZOLE SODIUM 20 MG/1
8 TABLET, DELAYED RELEASE ORAL
Qty: 80 | Refills: 0 | Status: DISCONTINUED | OUTPATIENT
Start: 2017-06-21 | End: 2017-06-23

## 2017-06-21 RX ORDER — FAMOTIDINE 10 MG/ML
20 INJECTION INTRAVENOUS ONCE
Qty: 0 | Refills: 0 | Status: DISCONTINUED | OUTPATIENT
Start: 2017-06-21 | End: 2017-06-21

## 2017-06-21 RX ORDER — LEVOTHYROXINE SODIUM 125 MCG
50 TABLET ORAL DAILY
Qty: 0 | Refills: 0 | Status: DISCONTINUED | OUTPATIENT
Start: 2017-06-21 | End: 2017-06-24

## 2017-06-21 RX ORDER — HYDRALAZINE HCL 50 MG
25 TABLET ORAL ONCE
Qty: 0 | Refills: 0 | Status: COMPLETED | OUTPATIENT
Start: 2017-06-21 | End: 2017-06-21

## 2017-06-21 RX ORDER — AMLODIPINE BESYLATE 2.5 MG/1
2.5 TABLET ORAL DAILY
Qty: 0 | Refills: 0 | Status: DISCONTINUED | OUTPATIENT
Start: 2017-06-21 | End: 2017-06-24

## 2017-06-21 RX ADMIN — PANTOPRAZOLE SODIUM 10 MG/HR: 20 TABLET, DELAYED RELEASE ORAL at 12:09

## 2017-06-21 RX ADMIN — PANTOPRAZOLE SODIUM 80 MILLIGRAM(S): 20 TABLET, DELAYED RELEASE ORAL at 12:08

## 2017-06-21 RX ADMIN — SODIUM CHLORIDE 60 MILLILITER(S): 9 INJECTION, SOLUTION INTRAVENOUS at 16:00

## 2017-06-21 RX ADMIN — Medication 25 MILLIGRAM(S): at 14:33

## 2017-06-21 NOTE — ED ADULT TRIAGE NOTE - CHIEF COMPLAINT QUOTE
Patient brought to ER from home by EMS for rectal bleeding. She has had loose stool for a couple of days but when they cleaned her yesterday they noted as little blood. But this morning she was noted to have a large amount of bleeding. Pt's blood pressure is elevated and has afib on monitor. IVL placed to left hand 20 gauge.

## 2017-06-21 NOTE — H&P ADULT - HISTORY OF PRESENT ILLNESS
87 yo F 87 y/o F PMHx dementia, B/L hip replacements, Afib (not on AC due to previous ICH), symptomatic bradycardia (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), COPD, CKDIII, recently discharged on 6/9/17 for dysphagia, was admitted for bloody stool/melena for 2 days. Pt AAO*1, information was obtained from daughter Maritza [phone]. As per daughter pt was noted to have loose stools for the past 2-3 days. Starting from yesterday noted to have some bright red blood mixed with the stool was noted by another daughter while she was changing the pt. Last night Pt was found to have large amount of black stool, another episode observed this AM. Daughter denied no other symptom, such as fever/chills, no N/V, no CP/SOB. Pt has been having good appetite reported feeling well.   Ever since last discharge, pt is only on baby asa, no recent use of other blood thinners or NSAIds use.   Pt had never got endoscopy or colonoscopy in  the past. She has been using holter monitor monthly, and sent report remotely to Cardiology Dr. garcia office, last time checked in May 2017.   In ER, Pt no acute distress. Was found BP elevated 233/83, s/p hydralazine 25 mg IV push. Pt remained calm, no distress, no more melena/rectal bleeding observed.

## 2017-06-21 NOTE — H&P ADULT - NSHPLABSRESULTS_GEN_ALL_CORE
LABS:                        12.2   10.33 )-----------( 360      ( 21 Jun 2017 11:12 )             39.2     06-21    139  |  103  |  27<H>  ----------------------------<  90  5.5<H>   |  21<L>  |  1.45<H>    Ca    9.3      21 Jun 2017 11:12    TPro  7.6  /  Alb  3.1<L>  /  TBili  0.4  /  DBili  x   /  AST  31  /  ALT  13  /  AlkPhos  90  06-21              RADIOLOGY & ADDITIONAL TESTS:    Imaging Personally Reviewed:    EKG afib @62 BPM, diffuse ST inversion V3-V6, no new change compare with previous record 6/7/17

## 2017-06-21 NOTE — ED ADULT NURSE NOTE - OBJECTIVE STATEMENT
Pt AOx2 from home c/o loose stool for a couple of days but when she was cleaned yesterday they noted as little blood. But this morning she was noted to have a large amount of bleeding. Pt remains hypertensive, denies chest pain, SOB, dizziness, n/v or other complaints.

## 2017-06-21 NOTE — H&P ADULT - PROBLEM SELECTOR PLAN 2
BP > 200 SBP, s/p hydralazine IV  Pt creatinine mildly elevated compared with baseline, meet criteria for HTN emergency, However as discussed with daughter, Pt Hx hypotension on BP medication, in the setting of GI bleeding, will not reduce BP aggressively. will start low dose of amlodipine, goal SBP over 24 hr 180 SBP.  Pt asymptomatic currently

## 2017-06-21 NOTE — H&P ADULT - ATTENDING COMMENTS
explained above findings and plan of care with daughter over the phone, Pt is full code as discussed with daughter.

## 2017-06-21 NOTE — H&P ADULT - ASSESSMENT
87 y/o F PMHx dementia, B/L hip replacements, Afib (not on AC due to previous ICH), symptomatic bradycardia (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), COPD, CKDIII, recently discharged on 6/9/17 for dysphagia, was admitted for bloody stool/melena for 2 days.

## 2017-06-21 NOTE — H&P ADULT - NSHPPHYSICALEXAM_GEN_ALL_CORE
Vital Signs Last 24 Hrs  T(C): 36.7, Max: 36.7 (06-21 @ 10:46)  HR: 66 (66 - 71)  BP: 222/113 (151/122 - 233/83)  RR: 17 (17 - 17)  SpO2: 99% (98% - 99%)  Wt(kg): --  CAPILLARY BLOOD GLUCOSE    I&O's Summary      PHYSICAL EXAM:  GENERAL: NAD,   HEAD:  Atraumatic, Normocephalic  EYES: EOMI, PERRLA, conjunctiva and sclera clear  NECK: Supple, No JVD  CHEST/LUNG: Clear to auscultation bilaterally; No wheeze  HEART: irregular rate and rhythm; No murmurs, rubs, or gallops, PPM palpated  ABDOMEN: Soft, Nontender, Nondistended; Bowel sounds present  EXTREMITIES:  2+ Peripheral Pulses, No clubbing, cyanosis, or edema  PSYCH: AAOx1  NEUROLOGY: non-focal, unable to coordinate, limited exam, muscle strength 5/5 b/l  SKIN: No rashes or lesions

## 2017-06-21 NOTE — H&P ADULT - PROBLEM SELECTOR PLAN 1
NPO, IVF, and Protonix gtt  repeat CBC  reviewed CT abd in may 2017, Hx of diverticulosis, will repeat CT abd w/o contrast  H/H stable, no indication for PRBC transfusion.   GI consulted

## 2017-06-21 NOTE — H&P ADULT - NSHPREVIEWOFSYSTEMS_GEN_ALL_CORE
Review of Systems:   CONSTITUTIONAL: No fever, weight loss, or fatigue  EYES: No eye pain, visual disturbances, or discharge  ENMT:  No difficulty hearing, tinnitus, vertigo; No sinus or throat pain  NECK: No pain or stiffness  BREASTS: No pain, masses, or nipple discharge  RESPIRATORY: No cough, wheezing, chills or hemoptysis; No shortness of breath  CARDIOVASCULAR: s/p PPM, No chest pain, palpitations, dizziness, or leg swelling  GASTROINTESTINAL: rectal bleeding/melena, diarrhea, No abdominal or epigastric pain. No nausea, vomiting, or hematemesis; No constipation. No melena or hematochezia.  GENITOURINARY: No dysuria, frequency, hematuria, or incontinence  NEUROLOGICAL: No headaches, memory loss, loss of strength, numbness, or tremors  SKIN: No itching, burning, rashes, or lesions   LYMPH NODES: No enlarged glands  ENDOCRINE: No heat or cold intolerance; No hair loss  MUSCULOSKELETAL: No joint pain or swelling; No muscle, back, or extremity pain  PSYCHIATRIC: No depression, anxiety, mood swings, or difficulty sleeping  HEME/LYMPH: No easy bruising, or bleeding gums  ALLERY AND IMMUNOLOGIC: No hives or eczema

## 2017-06-22 DIAGNOSIS — R13.10 DYSPHAGIA, UNSPECIFIED: ICD-10-CM

## 2017-06-22 DIAGNOSIS — K92.2 GASTROINTESTINAL HEMORRHAGE, UNSPECIFIED: ICD-10-CM

## 2017-06-22 DIAGNOSIS — F03.90 UNSPECIFIED DEMENTIA, UNSPECIFIED SEVERITY, WITHOUT BEHAVIORAL DISTURBANCE, PSYCHOTIC DISTURBANCE, MOOD DISTURBANCE, AND ANXIETY: ICD-10-CM

## 2017-06-22 LAB
BUN SERPL-MCNC: 30 MG/DL — HIGH (ref 7–23)
CALCIUM SERPL-MCNC: 9.3 MG/DL — SIGNIFICANT CHANGE UP (ref 8.4–10.5)
CHLORIDE SERPL-SCNC: 105 MMOL/L — SIGNIFICANT CHANGE UP (ref 98–107)
CO2 SERPL-SCNC: 22 MMOL/L — SIGNIFICANT CHANGE UP (ref 22–31)
CREAT SERPL-MCNC: 1.54 MG/DL — HIGH (ref 0.5–1.3)
GLUCOSE SERPL-MCNC: 113 MG/DL — HIGH (ref 70–99)
HCT VFR BLD CALC: 38.7 % — SIGNIFICANT CHANGE UP (ref 34.5–45)
HCT VFR BLD CALC: 40 % — SIGNIFICANT CHANGE UP (ref 34.5–45)
HGB BLD-MCNC: 12 G/DL — SIGNIFICANT CHANGE UP (ref 11.5–15.5)
HGB BLD-MCNC: 12.4 G/DL — SIGNIFICANT CHANGE UP (ref 11.5–15.5)
MCHC RBC-ENTMCNC: 27.7 PG — SIGNIFICANT CHANGE UP (ref 27–34)
MCHC RBC-ENTMCNC: 27.7 PG — SIGNIFICANT CHANGE UP (ref 27–34)
MCHC RBC-ENTMCNC: 31 % — LOW (ref 32–36)
MCHC RBC-ENTMCNC: 31 % — LOW (ref 32–36)
MCV RBC AUTO: 89.3 FL — SIGNIFICANT CHANGE UP (ref 80–100)
MCV RBC AUTO: 89.4 FL — SIGNIFICANT CHANGE UP (ref 80–100)
PLATELET # BLD AUTO: 361 K/UL — SIGNIFICANT CHANGE UP (ref 150–400)
PLATELET # BLD AUTO: 385 K/UL — SIGNIFICANT CHANGE UP (ref 150–400)
PMV BLD: 10.3 FL — SIGNIFICANT CHANGE UP (ref 7–13)
PMV BLD: 10.4 FL — SIGNIFICANT CHANGE UP (ref 7–13)
POTASSIUM SERPL-MCNC: 4.3 MMOL/L — SIGNIFICANT CHANGE UP (ref 3.5–5.3)
POTASSIUM SERPL-SCNC: 4.3 MMOL/L — SIGNIFICANT CHANGE UP (ref 3.5–5.3)
RBC # BLD: 4.33 M/UL — SIGNIFICANT CHANGE UP (ref 3.8–5.2)
RBC # BLD: 4.48 M/UL — SIGNIFICANT CHANGE UP (ref 3.8–5.2)
RBC # FLD: 16.7 % — HIGH (ref 10.3–14.5)
RBC # FLD: 16.9 % — HIGH (ref 10.3–14.5)
SODIUM SERPL-SCNC: 141 MMOL/L — SIGNIFICANT CHANGE UP (ref 135–145)
TSH SERPL-MCNC: 2.13 UIU/ML — SIGNIFICANT CHANGE UP (ref 0.27–4.2)
WBC # BLD: 12.91 K/UL — HIGH (ref 3.8–10.5)
WBC # BLD: 9.46 K/UL — SIGNIFICANT CHANGE UP (ref 3.8–10.5)
WBC # FLD AUTO: 12.91 K/UL — HIGH (ref 3.8–10.5)
WBC # FLD AUTO: 9.46 K/UL — SIGNIFICANT CHANGE UP (ref 3.8–10.5)

## 2017-06-22 PROCEDURE — 99233 SBSQ HOSP IP/OBS HIGH 50: CPT

## 2017-06-22 PROCEDURE — 99223 1ST HOSP IP/OBS HIGH 75: CPT

## 2017-06-22 PROCEDURE — 99222 1ST HOSP IP/OBS MODERATE 55: CPT | Mod: GC

## 2017-06-22 RX ADMIN — PANTOPRAZOLE SODIUM 10 MG/HR: 20 TABLET, DELAYED RELEASE ORAL at 18:03

## 2017-06-22 RX ADMIN — SODIUM CHLORIDE 60 MILLILITER(S): 9 INJECTION, SOLUTION INTRAVENOUS at 05:46

## 2017-06-22 RX ADMIN — PANTOPRAZOLE SODIUM 10 MG/HR: 20 TABLET, DELAYED RELEASE ORAL at 05:46

## 2017-06-22 RX ADMIN — AMLODIPINE BESYLATE 2.5 MILLIGRAM(S): 2.5 TABLET ORAL at 05:46

## 2017-06-22 RX ADMIN — SODIUM CHLORIDE 60 MILLILITER(S): 9 INJECTION, SOLUTION INTRAVENOUS at 09:38

## 2017-06-22 RX ADMIN — Medication 50 MICROGRAM(S): at 05:46

## 2017-06-22 NOTE — CONSULT NOTE ADULT - ASSESSMENT
Impression:  1) Hematochezia - likely lower GI bleeding - ddx Diverticulosis, Angioectasia, Colon Cancer, Hemorrhoids.  2) Black Stool - suspect upper GI bleeding vs right sided colonic bleeding - ddx PUD, NSAID Gastropathy, Angioectasia.  3) Bradycardia - with Pacemaker  4) Atrial Fibrillation    Plan:  - clear liquids, npo after midnight  - Protonix IV high dose infusion  - Monitor hemoglobin  - Will discuss with daughter regarding EGD + Colonoscopy Impression:  1) Hematochezia - likely lower GI bleeding - ddx Diverticulosis, Angioectasia, Colon Cancer, Hemorrhoids.  2) Black Stool - suspect upper GI bleeding vs right sided colonic bleeding - ddx PUD, NSAID Gastropathy, Angioectasia.  3) Bradycardia - with Pacemaker  4) Atrial Fibrillation    Plan:  - clear liquids  - Protonix IV high dose infusion  - Monitor hemoglobin  - Discussed extensively with daughter regarding options and endoscopic procedures ---> daughter preferred to defer endoscopic procedures, and to continue Protonix with conservative clinical management.  - Monitor clinically

## 2017-06-22 NOTE — PROGRESS NOTE ADULT - PROBLEM SELECTOR PLAN 1
GI PLAN NO SCOPING- they will do official NOTE. c/w Protonix  CBC Q12 GI PLAN NO SCOPING- they will do official NOTE. c/w Protonix  CBC Q12- monitor for Acute blood loss and need for transfusion. GI PLAN NO SCOPING- they will do official NOTE. c/w Protonix drip.  CBC Q12- monitor for Acute blood loss and need for transfusion.

## 2017-06-22 NOTE — CONSULT NOTE ADULT - PROBLEM SELECTOR RECOMMENDATION 2
As per GI conversations with family, conservative interventions, with Protonix.  No endoscopies at this point.

## 2017-06-22 NOTE — CONSULT NOTE ADULT - SUBJECTIVE AND OBJECTIVE BOX
HPI:   88 year old female with Dementia, Afib (not on AC due to previous ICH), Bradycardia (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), COPD, CKD III was admitted for 3 days of hematochezia described as stool mixed with bright red blood. Daughter also reports that she had one bowel movement that was black in color. The patient denies abdominal pain, nausea or vomiting. She denies fever or chills. She is taking Aspirin but no other NSAIDs.   Pt had never had an endoscopy or colonoscopy in  the past.    PAST MEDICAL & SURGICAL HISTORY:  Symptomatic bradycardia  Obese  Pruritus  Anxiety  Atrial fibrillation and flutter  HLD (hyperlipidemia)  CKD (chronic kidney disease)  High cholesterol  COPD (chronic obstructive pulmonary disease)  Arthritis  Benign essential HTN  Senile dementia with depression  Status post bilateral total hip replacement: several years ago  Status post placement of implantable loop recorder: 9/15  History of back surgery: x3, approimately 1980, 1985, 1991  H/O back injury  Hip problem: surgery b/l    Review of Systems: Negative except as per HPI.    MEDICATIONS  (STANDING):  pantoprazole Infusion 8mG/Hr IV Continuous <Continuous>  amLODIPine   Tablet 2.5milliGRAM(s) Oral daily  levothyroxine 50MICROGram(s) Oral daily  dextrose 5% + sodium chloride 0.9%. 1000milliLiter(s) IV Continuous <Continuous>    ALLERGIES:  beta blockers (Hypotension)    SOCIAL HISTORY:  - Alcohol: denies  - Recreational drug use: denies    FAMILY HISTORY:  No pertinent family history in first degree relatives  No significant family history    Vital Signs Last 24 Hrs  T(C): 36.4, Max: 36.7 (06-21 @ 10:46)  T(F): 97.6, Max: 98.1 (06-21 @ 15:50)  HR: 58 (52 - 71)  BP: 125/90 (125/90 - 233/83)  BP(mean): --  RR: 18 (17 - 18)  SpO2: 98% (96% - 99%)    PHYSICAL EXAM:  Constitutional: no acute distress  Eyes: no icterus  Neck: no masses, no LAD  Respiratory: normal inspiratory effort; no wheezing or crackles  Cardiovascular: RRR, normal S1/S2, no murmurs/rubs/gallops  Gastrointestinal: soft, nondistended, nontender, +BS  Rectal: dark brown stool with old red blood  Extremities: no LE edema  Neurological: AAOx3, no asterixis  Skin: no rashes, bruises, petechiae    LABS:                        12.4   9.46  )-----------( 361      ( 22 Jun 2017 06:41 )             40.0     141  |  105  |  30<H>  ----------------------------<  113<H>  4.3   |  22  |  1.54<H>    Ca    9.3      22 Jun 2017 06:41    TPro  7.6  /  Alb  3.1<L>  /  TBili  0.4  /  DBili  x   /  AST  31  /  ALT  13  /  AlkPhos  90  06-21    LIVER FUNCTIONS - ( 21 Jun 2017 11:12 )  Alb: 3.1 g/dL / Pro: 7.6 g/dL / ALK PHOS: 90 u/L / ALT: 13 u/L / AST: 31 u/L / GGT: x

## 2017-06-22 NOTE — PROGRESS NOTE ADULT - ASSESSMENT
Patient is a 88y old  Female who presents with a chief complaint of melena (21 Jun 2017 15:13).  Patient seen with Grand daughter Snehal at bedside - she want us to call patients daughter Maritza at [phone].  Snehal noted patient had small blood per Rectum on Tuesday 6/20, and Large blood yesturday 6/21/17- melena and BRBPR - so took her to ED.

## 2017-06-22 NOTE — CONSULT NOTE ADULT - PROBLEM SELECTOR RECOMMENDATION 9
At this point, FAST >7.  Progressive decline.   Patient close to being a candidate for hospice services.

## 2017-06-22 NOTE — CONSULT NOTE ADULT - PROBLEM SELECTOR RECOMMENDATION 4
If patient is still admitted in the next few days, will reach out to patients daughter to discuss advanced directives, DEANDRAST.

## 2017-06-22 NOTE — DIETITIAN INITIAL EVALUATION ADULT. - ADHERENCE
Granddaughter stated that Pt was consuming pureed texture diet and honey thickened liquids at home./good

## 2017-06-22 NOTE — PROGRESS NOTE ADULT - PROBLEM SELECTOR PLAN 4
Rate controlled. Cardilogy is Dr SHIRIN Sanchez Rate controlled. Cardioilogy is Dr SHIRIN Sanchez Rate controlled. Cardioilogy is Dr SHIRIN Sanchez.  Asa on HOLD.  CARDIOLOGY CONSULT CALLED- ?? NOT RESUME ASA ON D/ C pump

## 2017-06-22 NOTE — DIETITIAN INITIAL EVALUATION ADULT. - SOURCE
Granddaughter at bedside, PA, RN, RD previous notes, comprehensive chart review./family/significant other

## 2017-06-22 NOTE — CONSULT NOTE ADULT - SUBJECTIVE AND OBJECTIVE BOX
HPI:  87 y/o F PMHx dementia, B/L hip replacements, Afib (not on AC due to previous ICH), symptomatic bradycardia (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), COPD, CKDIII, recently discharged on 6/9/17 for dysphagia, was admitted for bloody stool/melena for 2 days. Patient with progressive decline, likely secondary to underlying dementia.       PERTINENT PMH REVIEWED:  [x ] YES [ ] NO           SOCIAL HISTORY:  Significant other/partner:  [ ] YES  [x ] NO            Children:  [x ] YES  [ ] NO                   Shinto/Spirituality: Mormon  Subtance hx:  [ ] YES   [x ] NO           Tobacco hx:  [ ] YES  [x ] NO             Alcohol hx: [ ] YES  [ x] NO        Home Opiod hx:  [ ] YES  [x ] NO   Living Situation: [x ] Home  [ ] Long term care  [ ] Rehab    REFERRALS:   [ ] Chaplaincy  [ ] Hospice  [ ] Child Life  [ ] Social Work  [ ] Case management [ ] Holistic Therapy     FAMILY HISTORY:  No pertinent family history in first degree relatives  No significant family history    [x ] Family history non contributory     BASELINE ADLs (prior to admission):  Independent [ ] moderately [ ] fully   Dependent   [ x] moderately [ ] fully    ADVANCE DIRECTIVES:  [ ] YES [ x] NO   DNR [ ] YES [x ] NO                      MOLST  [ ] YES [x ] NO    Living Will  [ ] YES [x ] NO    Health Care Proxy [ ] YES  [x ] NO      [x ] Surrogate  [ ] HCP  [ ] Guardian:                    Maritza Delgadillo (daughter)     Phone#:  [phone]    Allergies    latex (Flushing (Skin))  No Known Drug Allergies    Intolerances    beta blockers (Hypotension)      MEDICATIONS  (STANDING):  amLODIPine   Tablet 2.5milliGRAM(s) Oral daily  levothyroxine 50MICROGram(s) Oral daily  dextrose 5% + sodium chloride 0.9%. 1000milliLiter(s) IV Continuous <Continuous>  pantoprazole    Tablet 40milliGRAM(s) Oral two times a day before meals    MEDICATIONS  (PRN):      PRESENT SYMPTOMS:  Source: [ ] Patient   [ ] Family   [x ] Team     Pain: [ ] YES [x ] NO  OLDCARTS:     Dyspnea: [ ] YES [ ] NO   Anxiety: [ ] YES [ ] NO  Fatigue: [ ] YES [ ] NO   Nausea: [ ] YES [ ] NO  Loss of appetite: [ ] YES [ ] NO   Constipation: [ ] YES [ ] NO     Other Symptoms:  [ ] All other review of systems negative   [x ] Unable to obtain due to poor mentation     Karnofsky Performance Score/Palliative Performance Status Version 2:  30      %  Protein Calorie Malutrition:  [ ] Mild   [ ] Moderate   [ ] Severe     Vital Signs Last 24 Hrs  T(C): 37, Max: 37.3 (06-22 @ 21:42)  T(F): 98.6, Max: 99.1 (06-22 @ 21:42)  HR: 60 (60 - 82)  BP: 155/87 (142/90 - 157/95)  BP(mean): --  RR: 18 (18 - 20)  SpO2: 98% (96% - 98%)    Physical Exam:    General: [x ] Alert,  A&O x  1   [ ] lethargic   [ ] Agitated   [ ] Cachexia   HEENT: [ ] Normal   [x ] Dry mouth   [ ] ET Tube    [ ] Trach   Lungs: [x ] Clear [ ] Rhonchi  [ ] Crackles [ ] Wheezing [ ] Tachypnea  [ ] Audible excessive secretions   Cardiovascular:  [x ] Regular rate and rhythm  [ ] Irregular [ ] Tachycardia   [ ] Bradycardia   Abdomen: [ x] Soft  [ ] Distended  [ ]  [x ] +BS  [ x] Non tender [ ] Tender  [ ]PEG   [ ] NGT   Last BM:     Genitourinary: [ ] Normal [ x] Incontinent   [ ] Oliguria/Anuria   [ ] Guerrero  Musculoskeletal:  [ ] Normal   [x ] Generalized weakness  [ ] Bedbound   Neurological: [ ] No focal deficits  [ x] Cognitive impairment     Skin: [ x] Normal   [ ] Pressure ulcers     LABS:                        11.7   12.61 )-----------( 359      ( 23 Jun 2017 06:00 )             37.5     06-23    141  |  104  |  27<H>  ----------------------------<  88  4.5   |  16<L>  |  1.31<H>    Ca    8.6      23 Jun 2017 06:00          I&O's Summary      RADIOLOGY & ADDITIONAL STUDIES:

## 2017-06-22 NOTE — DIETITIAN INITIAL EVALUATION ADULT. - NS AS NUTRI INTERV MEALS SNACK
1) Please change diet to honey-thickened liquids as per last SLP recommendations. 2) Consider SLP consult during this admission to determine if possible to upgrade liquids consistently. 3) When medically feasible and GI bleed resolved; consider adding pureed diet. 4) RD remains available for any additional nutrition related recommendations.

## 2017-06-22 NOTE — CHART NOTE - NSCHARTNOTEFT_GEN_A_CORE
GI called regarding EGD/colon planned. Per Fellow, daughter refusing scope at present. Conservative management. Protonix gtts and clears for now. Will continue to monitor for resolution

## 2017-06-22 NOTE — DIETITIAN INITIAL EVALUATION ADULT. - OTHER INFO
Nutrition consult received for chewing or swallowing difficulties. Pt is 87 y/o F with past medical history inclusive of dementia, B/L hip replacement, Afib, COPD, CKD stage 3, recently discharged on 6/9/17 for dysphagia. Pt admitted for bloody stool. Noted from daughter and GI discussion regarding endoscopic procedure; daughter refused at this time. Met with granddaughter at bedside. Pt is NPO since admission (1 day). Noted last swallow eval/MBS was on 06/09 with recommendation; "Pureed diet with honey thickened liquids". As per granddaughter Pt dislikes honey thickened liquids and requested test repeat; team made aware. Granddaughter denies any recent significant weight loss; however noted weight gain of 3.5 kg (since last admission, in 14 days); likely d/u fluid retention; +1 left foot. From previous RD notes; noted weight loss of 50# (~22.5 kg) over ~1 year (~26% weight loss). Also noted in July 2016; Pt was receiving enteral feeds of Jevity 1.2 Soren. Patient has no known food allergies. RD remains available for any additional nutrition related recommendations.

## 2017-06-22 NOTE — PROGRESS NOTE ADULT - ATTENDING COMMENTS
call Carlos Salas at [phone] call Daughter Maritza at [phone].  PCP ds Dr Hair at [phone] call Daughter Maritza at [phone].  PCP ds Dr Hair at [phone]    Pallitive consult   KEEP Hgb >8.5/27

## 2017-06-22 NOTE — CONSULT NOTE ADULT - SUBJECTIVE AND OBJECTIVE BOX
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 No fevers/chills/v/n/ cp/ sob.     patients hemoglobin remains stable around 12.9 since admission. started on protonix drip. never hypotensive. started on protonix ggt. Fam deferring c-scope. c/sed to evaluate for anticoag mgmt re: afib.     In the ED,   Allergies    latex (Flushing (Skin))  No Known Drug Allergies    Intolerances    beta blockers (Hypotension)  	    MEDICATIONS:  amLODIPine   Tablet 2.5milliGRAM(s) Oral daily          pantoprazole Infusion 8mG/Hr IV Continuous <Continuous>    levothyroxine 50MICROGram(s) Oral daily    dextrose 5% + sodium chloride 0.9%. 1000milliLiter(s) IV Continuous <Continuous>      PAST MEDICAL & SURGICAL HISTORY:  Symptomatic bradycardia  Obese  Pruritus  Anxiety  Atrial fibrillation and flutter  HLD (hyperlipidemia)  CKD (chronic kidney disease)  High cholesterol  COPD (chronic obstructive pulmonary disease)  Arthritis  Benign essential HTN  Senile dementia with depression  Status post bilateral total hip replacement: several years ago  Status post placement of implantable loop recorder: 9/15  History of back surgery: x3, approimately 1980, 1985, 1991  H/O back injury  Hip problem: surgery b/l      FAMILY HISTORY:  No pertinent family history in first degree relatives  No significant family history      SOCIAL HISTORY:    [ ] Non-smoker        REVIEW OF SYSTEMS:  See HPI. Otherwise, 10 point ROS done and otherwise negative.    PHYSICAL EXAM:  T(C): 37.2, Max: 37.2 (06-22 @ 14:35)  HR: 82 (52 - 82)  BP: 157/95 (125/90 - 157/95)  RR: 20 (18 - 20)  SpO2: 96% (96% - 98%)  Wt(kg): --  I&O's Summary      Appearance: Normal	  HEENT:   Normal oral mucosa, PERRL, EOMI	  Lymphatic: No lymphadenopathy  Cardiovascular: Normal S1 S2, No JVD, No murmurs, No edema  Respiratory: Lungs clear to auscultation	  Psychiatry: A & O x 3, Mood & affect appropriate  Gastrointestinal:  Soft, Non-tender, + BS	  Skin: No rashes, No ecchymoses, No cyanosis	  Neurologic: Non-focal  Extremities: Normal range of motion, No clubbing, cyanosis or edema  Vascular: Peripheral pulses palpable 2+ bilaterally        LABS:	 	    CBC Full  -  ( 22 Jun 2017 06:41 )  WBC Count : 9.46 K/uL  Hemoglobin : 12.4 g/dL  Hematocrit : 40.0 %  Platelet Count - Automated : 361 K/uL  Mean Cell Volume : 89.3 fL  Mean Cell Hemoglobin : 27.7 pg  Mean Cell Hemoglobin Concentration : 31.0 %  Auto Neutrophil # : x  Auto Lymphocyte # : x  Auto Monocyte # : x  Auto Eosinophil # : x  Auto Basophil # : x  Auto Neutrophil % : x  Auto Lymphocyte % : x  Auto Monocyte % : x  Auto Eosinophil % : x  Auto Basophil % : x    06-22    141  |  105  |  30<H>  ----------------------------<  113<H>  4.3   |  22  |  1.54<H>  06-21    141  |  109<H>  |  26<H>  ----------------------------<  601<HH>  3.7   |  17<L>  |  1.35<H>    Ca    9.3      22 Jun 2017 06:41  Ca    7.6<L>      21 Jun 2017 21:30    TPro  7.6  /  Alb  3.1<L>  /  TBili  0.4  /  DBili  x   /  AST  31  /  ALT  13  /  AlkPhos  90  06-21      TSH: Thyroid Stimulating Hormone, Serum: 2.13 uIU/mL (06-22 @ 06:41)  PREVIOUS DIAGNOSTIC TESTING:    [ ] Echocardiogram:  6/2016  Observations:  Mitral Valve: Mitral annular calcification. Tethered mitral  valve leaflets with normal opening. Mild mitral  regurgitation.  Aortic Valve/Aorta: Calcified trileaflet aortic valve with  normal opening. Mild aortic regurgitation.  Normal aortic root (Ao: 2.7 cm at the sinuses of Valsalva).  Left Atrium: Mildly dilated left atrium.  LA volume index =  41 cc/m2.  Left Ventricle: Normal left ventricular systolic function.  No segmental wall motion abnormalities. Normal left  ventricular internal dimensions and wall thicknesses.  Right Heart: Mild right atrial enlargement. A device wire  is noted in the right heart. Normal right ventricular size  and function. Normal tricuspid valve. Mild-moderate  tricuspid regurgitation. Normal pulmonic valve. Mild  pulmonic regurgitation.  Pericardium/Pleura: Normal pericardium with no pericardial  effusion.  Hemodynamic: Estimated right atrial pressure is 8 mm Hg.  Estimated right ventricular systolic pressure equals 44 mm  Hg, assuming right atrial pressure equals 8 mm Hg,  consistent with mild pulmonary hypertension.  ------------------------------------------------------------------------  Conclusions:  1. Normal left ventricular internal dimensions and wall  thicknesses.  2. Normal left ventricular systolic function. No segmental  wall motion abnormalities.  3. Normal right ventricular size and function.  4. Estimated pulmonary artery systolic pressure equals 44  mm Hg, assuming right atrial pressure equals 8 mm Hg,  consistent with mild pulmonary pressures.  *** No previous Echo exam.    	  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 No fevers/chills/v/n/ cp/ sob.     patients hemoglobin remains stable around 12.9 since admission. started on protonix drip. never hypotensive. started on protonix ggt. Fam deferring c-scope. c/sed to evaluate for anticoag mgmt re: afib.     In the ED,   Allergies    latex (Flushing (Skin))  No Known Drug Allergies    Intolerances    beta blockers (Hypotension)  	    MEDICATIONS:  amLODIPine   Tablet 2.5milliGRAM(s) Oral daily          pantoprazole Infusion 8mG/Hr IV Continuous <Continuous>    levothyroxine 50MICROGram(s) Oral daily    dextrose 5% + sodium chloride 0.9%. 1000milliLiter(s) IV Continuous <Continuous>      PAST MEDICAL & SURGICAL HISTORY:  Symptomatic bradycardia  Obese  Pruritus  Anxiety  Atrial fibrillation and flutter  HLD (hyperlipidemia)  CKD (chronic kidney disease)  High cholesterol  COPD (chronic obstructive pulmonary disease)  Arthritis  Benign essential HTN  Senile dementia with depression  Status post bilateral total hip replacement: several years ago  Status post placement of implantable loop recorder: 9/15  History of back surgery: x3, approimately 1980, 1985, 1991  H/O back injury  Hip problem: surgery b/l      FAMILY HISTORY:  No pertinent family history in first degree relatives  No significant family history      SOCIAL HISTORY:    [ ] Non-smoker        REVIEW OF SYSTEMS:  See HPI. Otherwise, 10 point ROS done and otherwise negative.    PHYSICAL EXAM:  T(C): 37.2, Max: 37.2 (06-22 @ 14:35)  HR: 82 (52 - 82)  BP: 157/95 (125/90 - 157/95)  RR: 20 (18 - 20)  SpO2: 96% (96% - 98%)  Wt(kg): --  I&O's Summary      Appearance: Normal	  HEENT:   Normal oral mucosa, PERRL, EOMI	  Lymphatic: No lymphadenopathy  Cardiovascular: Normal S1 S2, No JVD, No murmurs, No edema  Respiratory: Lungs clear to auscultation	  Psychiatry: A & O x 3, Mood & affect appropriate  Gastrointestinal:  Soft, Non-tender, + BS	  Skin: No rashes, No ecchymoses, No cyanosis	  Neurologic: Non-focal  Extremities: Normal range of motion, No clubbing, cyanosis or edema  Vascular: Peripheral pulses palpable 2+ bilaterally        LABS:	 	    CBC Full  -  ( 22 Jun 2017 06:41 )  WBC Count : 9.46 K/uL  Hemoglobin : 12.4 g/dL  Hematocrit : 40.0 %  Platelet Count - Automated : 361 K/uL  Mean Cell Volume : 89.3 fL  Mean Cell Hemoglobin : 27.7 pg  Mean Cell Hemoglobin Concentration : 31.0 %  Auto Neutrophil # : x  Auto Lymphocyte # : x  Auto Monocyte # : x  Auto Eosinophil # : x  Auto Basophil # : x  Auto Neutrophil % : x  Auto Lymphocyte % : x  Auto Monocyte % : x  Auto Eosinophil % : x  Auto Basophil % : x    06-22    141  |  105  |  30<H>  ----------------------------<  113<H>  4.3   |  22  |  1.54<H>  06-21    141  |  109<H>  |  26<H>  ----------------------------<  601<HH>  3.7   |  17<L>  |  1.35<H>    Ca    9.3      22 Jun 2017 06:41  Ca    7.6<L>      21 Jun 2017 21:30    TPro  7.6  /  Alb  3.1<L>  /  TBili  0.4  /  DBili  x   /  AST  31  /  ALT  13  /  AlkPhos  90  06-21      TSH: Thyroid Stimulating Hormone, Serum: 2.13 uIU/mL (06-22 @ 06:41)  PREVIOUS DIAGNOSTIC TESTING:    [ ] Echocardiogram:  6/2016  Observations:  Mitral Valve: Mitral annular calcification. Tethered mitral  valve leaflets with normal opening. Mild mitral  regurgitation.  Aortic Valve/Aorta: Calcified trileaflet aortic valve with  normal opening. Mild aortic regurgitation.  Normal aortic root (Ao: 2.7 cm at the sinuses of Valsalva).  Left Atrium: Mildly dilated left atrium.  LA volume index =  41 cc/m2.  Left Ventricle: Normal left ventricular systolic function.  No segmental wall motion abnormalities. Normal left  ventricular internal dimensions and wall thicknesses.  Right Heart: Mild right atrial enlargement. A device wire  is noted in the right heart. Normal right ventricular size  and function. Normal tricuspid valve. Mild-moderate  tricuspid regurgitation. Normal pulmonic valve. Mild  pulmonic regurgitation.  Pericardium/Pleura: Normal pericardium with no pericardial  effusion.  Hemodynamic: Estimated right atrial pressure is 8 mm Hg.  Estimated right ventricular systolic pressure equals 44 mm  Hg, assuming right atrial pressure equals 8 mm Hg,  consistent with mild pulmonary hypertension.  ------------------------------------------------------------------------  Conclusions:  1. Normal left ventricular internal dimensions and wall  thicknesses.  2. Normal left ventricular systolic function. No segmental  wall motion abnormalities.  3. Normal right ventricular size and function.  4. Estimated pulmonary artery systolic pressure equals 44  mm Hg, assuming right atrial pressure equals 8 mm Hg,  consistent with mild pulmonary pressures.  *** No previous Echo exam.    	  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    CHADSVASC 3 on asa 81mg given history of ICH. At this time, given no evidence of dropping H&H, stable vitals, would favor maintaining of asa 81. However, if there is truly high suspicion for an UGIB, given low functional status, would be appropriate to d/c asa.    D/w Dr Daniel Swan [place] [id number]

## 2017-06-22 NOTE — CONSULT NOTE ADULT - ATTENDING COMMENTS
I have seen and evaluated the patient with the GI Fellow and GI Team.  I agree with the findings, formulation and plan of care as documented in the resident / fellows note, except as noted.  The patient is stable and daughter does not wish endoscopy at this time.

## 2017-06-22 NOTE — PROGRESS NOTE ADULT - SUBJECTIVE AND OBJECTIVE BOX
Patient is a 88y old  Female who presents with a chief complaint of melena (21 Jun 2017 15:13).  Patient seen with Grand daughter Snehal at bedside - she want us to call patients daughter Maritza at [phone].  Snehal noted patient had small blood per Rectum on Tuesday 6/20, and Large blood yesturday 6/21/17- melena and BRBPR - so took her to ED.      SUBJECTIVE / OVERNIGHT EVENTS:    MEDICATIONS  (STANDING):  pantoprazole Infusion 8mG/Hr IV Continuous <Continuous>  amLODIPine   Tablet 2.5milliGRAM(s) Oral daily  levothyroxine 50MICROGram(s) Oral daily  dextrose 5% + sodium chloride 0.9%. 1000milliLiter(s) IV Continuous <Continuous>    MEDICATIONS  (PRN):      Vital Signs Last 24 Hrs  T(C): 36.4, Max: 36.7 (06-21 @ 15:50)  HR: 58 (52 - 66)  BP: 125/90 (125/90 - 147/99)  RR: 18 (18 - 18)  SpO2: 98% (96% - 98%)  Wt(kg): --  CAPILLARY BLOOD GLUCOSE  111 (21 Jun 2017 22:47)    I&O's Summary      PHYSICAL EXAM:  GENERAL: NAD, well-developed  HEAD:  Atraumatic, Normocephalic, PLEASANT WOMAN  EYES: EOMI, PERRLA, conjunctiva and sclera clear  NECK: Supple, No JVD  CHEST/LUNG: Clear to auscultation bilaterally; No wheeze  HEART: Regular rate and rhythm; No murmurs, rubs, or gallops  ABDOMEN: Soft, Nontender,OBESE,  Nondistended; Bowel sounds present  EXTREMITIES:  2+ Peripheral Pulses, No clubbing, cyanosis, or edema to B/l ankles L>R.  PSYCH: AAOx3  NEUROLOGY: non-focal    LABS:                        12.4   9.46  )-----------( 361      ( 22 Jun 2017 06:41 )             40.0     06-22    141  |  105  |  30<H>  ----------------------------<  113<H>  4.3   |  22  |  1.54<H>    Ca    9.3      22 Jun 2017 06:41    TPro  7.6  /  Alb  3.1<L>  /  TBili  0.4  /  DBili  x   /  AST  31  /  ALT  13  /  AlkPhos  90  06-21              RADIOLOGY & ADDITIONAL TESTS:    Imaging Personally Reviewed:    Consultant(s) Notes Reviewed:      Care Discussed with Consultants/Other Providers:GI Patient is a 88y old  Female who presents with a chief complaint of melena (21 Jun 2017 15:13).  87 y/o F PMHx dementia, B/L hip replacements, Afib (not on AC due to previous ICH), symptomatic bradycardia (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), COPD, CKDIII, recently discharged on 6/9/17 for dysphagia, was admitted for bloody stool/melena for 2 days. Pt AAO*1, information was obtained from daughter Maritza [phone]. As per daughter pt was noted to have loose stools for the past 2-3 days. Starting from yesterday noted to have some bright red blood mixed with the stool was noted by another daughter while she was changing the pt. Last night Pt was found to have large amount of black stool, another episode observed this AM. Daughter denied no other symptom, such as fever/chills, no N/V, no CP/SOB. Pt has been having good appetite reported feeling well.   Ever since last discharge, pt is only on baby asa, no recent use of other blood thinners or NSAIds use.   Pt had never got endoscopy or colonoscopy in  the past. She has been using holter monitor monthly, and sent report remotely to Cardiology Dr. garcia office, last time checked in May 2017.   In ER, Pt no acute distress. Was found BP elevated 233/83, s/p hydralazine 25 mg IV push. Pt remained calm, no distress, no more melena/rectal bleeding observed.   Patient seen with Grand daughter Snehal at bedside - she want us to call patients daughter Maritza at [phone].  Snehal noted patient had small blood per Rectum on Tuesday 6/20, and Large blood yesturday 6/21/17- melena and BRBPR - so took her to ED.      SUBJECTIVE / OVERNIGHT EVENTS:    MEDICATIONS  (STANDING):  pantoprazole Infusion 8mG/Hr IV Continuous <Continuous>  amLODIPine   Tablet 2.5milliGRAM(s) Oral daily  levothyroxine 50MICROGram(s) Oral daily  dextrose 5% + sodium chloride 0.9%. 1000milliLiter(s) IV Continuous <Continuous>    MEDICATIONS  (PRN):      Vital Signs Last 24 Hrs  T(C): 36.4, Max: 36.7 (06-21 @ 15:50)  HR: 58 (52 - 66)  BP: 125/90 (125/90 - 147/99)  RR: 18 (18 - 18)  SpO2: 98% (96% - 98%)  Wt(kg): --  CAPILLARY BLOOD GLUCOSE  111 (21 Jun 2017 22:47)    I&O's Summary      PHYSICAL EXAM:  GENERAL: NAD, well-developed  HEAD:  Atraumatic, Normocephalic, PLEASANT WOMAN  EYES: EOMI, PERRLA, conjunctiva and sclera clear  NECK: Supple, No JVD  CHEST/LUNG: Clear to auscultation bilaterally; No wheeze  HEART: Regular rate and rhythm; No murmurs, rubs, or gallops  ABDOMEN: Soft, Nontender,OBESE,  Nondistended; Bowel sounds present  EXTREMITIES:  2+ Peripheral Pulses, No clubbing, cyanosis, or edema to B/l ankles L>R.  PSYCH: AAOx3  NEUROLOGY: non-focal    LABS:                        12.4   9.46  )-----------( 361      ( 22 Jun 2017 06:41 )             40.0     06-22    141  |  105  |  30<H>  ----------------------------<  113<H>  4.3   |  22  |  1.54<H>    Ca    9.3      22 Jun 2017 06:41    TPro  7.6  /  Alb  3.1<L>  /  TBili  0.4  /  DBili  x   /  AST  31  /  ALT  13  /  AlkPhos  90  06-21              RADIOLOGY & ADDITIONAL TESTS:    Imaging Personally Reviewed:    Consultant(s) Notes Reviewed:      Care Discussed with Consultants/Other Providers:GI

## 2017-06-23 DIAGNOSIS — R53.81 OTHER MALAISE: ICD-10-CM

## 2017-06-23 DIAGNOSIS — Z51.5 ENCOUNTER FOR PALLIATIVE CARE: ICD-10-CM

## 2017-06-23 LAB
BUN SERPL-MCNC: 27 MG/DL — HIGH (ref 7–23)
CALCIUM SERPL-MCNC: 8.6 MG/DL — SIGNIFICANT CHANGE UP (ref 8.4–10.5)
CHLORIDE SERPL-SCNC: 104 MMOL/L — SIGNIFICANT CHANGE UP (ref 98–107)
CO2 SERPL-SCNC: 16 MMOL/L — LOW (ref 22–31)
CREAT SERPL-MCNC: 1.31 MG/DL — HIGH (ref 0.5–1.3)
GLUCOSE SERPL-MCNC: 88 MG/DL — SIGNIFICANT CHANGE UP (ref 70–99)
HCT VFR BLD CALC: 37.5 % — SIGNIFICANT CHANGE UP (ref 34.5–45)
HCT VFR BLD CALC: 38.7 % — SIGNIFICANT CHANGE UP (ref 34.5–45)
HGB BLD-MCNC: 11.7 G/DL — SIGNIFICANT CHANGE UP (ref 11.5–15.5)
HGB BLD-MCNC: 12 G/DL — SIGNIFICANT CHANGE UP (ref 11.5–15.5)
MCHC RBC-ENTMCNC: 27.7 PG — SIGNIFICANT CHANGE UP (ref 27–34)
MCHC RBC-ENTMCNC: 27.8 PG — SIGNIFICANT CHANGE UP (ref 27–34)
MCHC RBC-ENTMCNC: 31 % — LOW (ref 32–36)
MCHC RBC-ENTMCNC: 31.2 % — LOW (ref 32–36)
MCV RBC AUTO: 89.1 FL — SIGNIFICANT CHANGE UP (ref 80–100)
MCV RBC AUTO: 89.4 FL — SIGNIFICANT CHANGE UP (ref 80–100)
PLATELET # BLD AUTO: 311 K/UL — SIGNIFICANT CHANGE UP (ref 150–400)
PLATELET # BLD AUTO: 359 K/UL — SIGNIFICANT CHANGE UP (ref 150–400)
PMV BLD: 11 FL — SIGNIFICANT CHANGE UP (ref 7–13)
PMV BLD: 11.9 FL — SIGNIFICANT CHANGE UP (ref 7–13)
POTASSIUM SERPL-MCNC: 4.5 MMOL/L — SIGNIFICANT CHANGE UP (ref 3.5–5.3)
POTASSIUM SERPL-SCNC: 4.5 MMOL/L — SIGNIFICANT CHANGE UP (ref 3.5–5.3)
RBC # BLD: 4.21 M/UL — SIGNIFICANT CHANGE UP (ref 3.8–5.2)
RBC # BLD: 4.33 M/UL — SIGNIFICANT CHANGE UP (ref 3.8–5.2)
RBC # FLD: 16.9 % — HIGH (ref 10.3–14.5)
RBC # FLD: 16.9 % — HIGH (ref 10.3–14.5)
SODIUM SERPL-SCNC: 141 MMOL/L — SIGNIFICANT CHANGE UP (ref 135–145)
WBC # BLD: 11.75 K/UL — HIGH (ref 3.8–10.5)
WBC # BLD: 12.61 K/UL — HIGH (ref 3.8–10.5)
WBC # FLD AUTO: 11.75 K/UL — HIGH (ref 3.8–10.5)
WBC # FLD AUTO: 12.61 K/UL — HIGH (ref 3.8–10.5)

## 2017-06-23 PROCEDURE — 99232 SBSQ HOSP IP/OBS MODERATE 35: CPT | Mod: GC

## 2017-06-23 PROCEDURE — 99233 SBSQ HOSP IP/OBS HIGH 50: CPT

## 2017-06-23 RX ORDER — AMLODIPINE BESYLATE 2.5 MG/1
1 TABLET ORAL
Qty: 30 | Refills: 0 | OUTPATIENT
Start: 2017-06-23 | End: 2017-07-23

## 2017-06-23 RX ORDER — PANTOPRAZOLE SODIUM 20 MG/1
40 TABLET, DELAYED RELEASE ORAL
Qty: 0 | Refills: 0 | Status: DISCONTINUED | OUTPATIENT
Start: 2017-06-23 | End: 2017-06-24

## 2017-06-23 RX ORDER — PANTOPRAZOLE SODIUM 20 MG/1
1 TABLET, DELAYED RELEASE ORAL
Qty: 60 | Refills: 0 | OUTPATIENT
Start: 2017-06-23 | End: 2017-07-23

## 2017-06-23 RX ADMIN — AMLODIPINE BESYLATE 2.5 MILLIGRAM(S): 2.5 TABLET ORAL at 07:58

## 2017-06-23 RX ADMIN — Medication 50 MICROGRAM(S): at 07:58

## 2017-06-23 RX ADMIN — PANTOPRAZOLE SODIUM 10 MG/HR: 20 TABLET, DELAYED RELEASE ORAL at 05:44

## 2017-06-23 RX ADMIN — PANTOPRAZOLE SODIUM 40 MILLIGRAM(S): 20 TABLET, DELAYED RELEASE ORAL at 17:10

## 2017-06-23 NOTE — PROGRESS NOTE ADULT - SUBJECTIVE AND OBJECTIVE BOX
Patient is a 88y old  Female who presents with a chief complaint of melena (21 Jun 2017 15:13). Patient had H/h monitored q12.   No more bleeding today.  H/H stable.  Gi rec change to protonix 40 mg bid.      SUBJECTIVE / OVERNIGHT EVENTS:    MEDICATIONS  (STANDING):  amLODIPine   Tablet 2.5milliGRAM(s) Oral daily  levothyroxine 50MICROGram(s) Oral daily  dextrose 5% + sodium chloride 0.9%. 1000milliLiter(s) IV Continuous <Continuous>  pantoprazole    Tablet 40milliGRAM(s) Oral two times a day before meals    MEDICATIONS  (PRN):      Vital Signs Last 24 Hrs  T(C): 37, Max: 37.3 (06-22 @ 21:42)  HR: 60 (60 - 82)  BP: 155/87 (142/90 - 157/95)  RR: 18 (18 - 20)  SpO2: 98% (96% - 98%)  Wt(kg): --  CAPILLARY BLOOD GLUCOSE    I&O's Summary      PHYSICAL EXAM:  GENERAL: NAD, well-developed, COMFORTABLE but does monosylabus ansers to questions.  HEAD:  Atraumatic, Normocephalic  EYES: EOMI, PERRLA, conjunctiva and sclera clear  NECK: Supple, No JVD  CHEST/LUNG: Clear to auscultation bilaterally; No wheeze  HEART: Regular rate and rhythm; No murmurs, rubs, or gallops  ABDOMEN: Soft, Nontender, Nondistended; Bowel sounds present  EXTREMITIES:  2+ Peripheral Pulses, No clubbing, cyanosis, or edema.  NEUROLOGY: non-focal  SKIN: No rashes or lesions    LABS:                        11.7   12.61 )-----------( 359      ( 23 Jun 2017 06:00 )             37.5     06-23    141  |  104  |  27<H>  ----------------------------<  88  4.5   |  16<L>  |  1.31<H>    Ca    8.6      23 Jun 2017 06:00        RADIOLOGY & ADDITIONAL TESTS:    Imaging Personally Reviewed:    Consultant(s) Notes Reviewed:      Care Discussed with Consultants/Other Providers: GI Patient is a 88y old  Female who presents with a chief complaint of melena (21 Jun 2017 15:13). Patient had H/h monitored q12. - Stable  No more bleeding today.  H/H stable.  Gi rec change to protonix 40 mg bid.      SUBJECTIVE / OVERNIGHT EVENTS:    MEDICATIONS  (STANDING):  amLODIPine   Tablet 2.5milliGRAM(s) Oral daily  levothyroxine 50MICROGram(s) Oral daily  dextrose 5% + sodium chloride 0.9%. 1000milliLiter(s) IV Continuous <Continuous>  pantoprazole    Tablet 40milliGRAM(s) Oral two times a day before meals    MEDICATIONS  (PRN):      Vital Signs Last 24 Hrs  T(C): 37, Max: 37.3 (06-22 @ 21:42)  HR: 60 (60 - 82)  BP: 155/87 (142/90 - 157/95)  RR: 18 (18 - 20)  SpO2: 98% (96% - 98%)  Wt(kg): --  CAPILLARY BLOOD GLUCOSE    I&O's Summary      PHYSICAL EXAM:  GENERAL: NAD, well-developed, COMFORTABLE but does monosylabus ansers to questions.  HEAD:  Atraumatic, Normocephalic  EYES: EOMI, PERRLA, conjunctiva and sclera clear  NECK: Supple, No JVD  CHEST/LUNG: Clear to auscultation bilaterally; No wheeze  HEART: Regular rate and rhythm; No murmurs, rubs, or gallops  ABDOMEN: Soft, Nontender, Nondistended; Bowel sounds present  EXTREMITIES:  2+ Peripheral Pulses, No clubbing, cyanosis, or edema.  NEUROLOGY: non-focal  SKIN: No rashes or lesions    LABS:                        11.7   12.61 )-----------( 359      ( 23 Jun 2017 06:00 )             37.5     06-23    141  |  104  |  27<H>  ----------------------------<  88  4.5   |  16<L>  |  1.31<H>    Ca    8.6      23 Jun 2017 06:00        RADIOLOGY & ADDITIONAL TESTS:    Imaging Personally Reviewed:    Consultant(s) Notes Reviewed:      Care Discussed with Consultants/Other Providers: GI

## 2017-06-23 NOTE — DISCHARGE NOTE ADULT - CARE PLAN
Principal Discharge DX:	Upper GI bleed  Goal:	Pt will be from bleeding  Instructions for follow-up, activity and diet:	Follow up with your PCP in 1-2 weeks.   Call for an appointment  Secondary Diagnosis:	Benign essential HTN  Secondary Diagnosis:	Atrial fibrillation and flutter  Goal:	Stop aspirin Principal Discharge DX:	Upper GI bleed  Goal:	Pt will be from bleeding  Instructions for follow-up, activity and diet:	Follow up with your PCP in 1-2 weeks.   Call for an appointment. Po Protonix 40 mg po BID .  Secondary Diagnosis:	Benign essential HTN  Instructions for follow-up, activity and diet:	c/w norvasc  Secondary Diagnosis:	Atrial fibrillation and flutter  Goal:	Stop aspirin  Instructions for follow-up, activity and diet:	intrinsic rate controlled. asa stop due to concern for gi bleed. Principal Discharge DX:	Upper GI bleed  Goal:	Pt will be from bleeding  Instructions for follow-up, activity and diet:	Follow up with your PCP in 1-2 weeks.   Call for an appointment. Po Protonix 40 mg po BID .  Secondary Diagnosis:	Benign essential HTN  Instructions for follow-up, activity and diet:	c/w Norvasc  Secondary Diagnosis:	Atrial fibrillation and flutter  Goal:	Stop aspirin  Instructions for follow-up, activity and diet:	intrinsic rate controlled. asa stop due to concern for gi bleed.

## 2017-06-23 NOTE — SWALLOW BEDSIDE ASSESSMENT ADULT - SWALLOW EVAL: CURRENT DIET
clear diet pending speech and swallow evaluation, per telephone conversation with ADS at [employer] #[id number]

## 2017-06-23 NOTE — SWALLOW BEDSIDE ASSESSMENT ADULT - SWALLOW EVAL: RECOMMENDED FEEDING/EATING TECHNIQUES
alternate food with liquid/maintain upright posture during/after eating for 30 mins/oral hygiene/allow for swallow between intakes/crush medication (when feasible)/small sips/bites/no straws/position upright (90 degrees)

## 2017-06-23 NOTE — SWALLOW BEDSIDE ASSESSMENT ADULT - SWALLOW EVAL: DIAGNOSIS
1- pt demonstrates mild oral dysphagia given puree and honey thick liquid textures marked by mildly delayed bolus collection, transfer and transport. 2- pt demonstrated moderate pharyngeal dysphagia with puree and honey thick liquid textures marked by delayed swallow trigger and reduced hyolaryngeal excursion without any overt s/s of penetration/aspiration noted. 3- nectar thick and thin liquid textures not provided at this time given known history of silent penetration with textures. 4-solid textures not provided given pt incomplete/scattered dentition.

## 2017-06-23 NOTE — PROGRESS NOTE ADULT - ATTENDING COMMENTS
Spoke with daughter Maritza at [phone]  Hbg stable at 12.0 on 1 pm Lab   Mother is optimized for HOME.  No plans for scoping by GI Diet advanced to Pureed.  No more ASA.  Protonix 40 mg po bid.to Nelly at [phone]  Plan for HOME 6/24/17 after RAIN.

## 2017-06-23 NOTE — DISCHARGE NOTE ADULT - HOSPITAL COURSE
89 y/o F PMHx dementia, B/L hip replacements, Afib (not on AC due to previous ICH), symptomatic bradycardia (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), COPD, CKDIII, recently discharged on 6/9/17 for dysphagia, was admitted for bloody stool/melena for 2 days. Pt AAO*1, information was obtained from daughter Maritza [phone]. As per daughter pt was noted to have loose stools for the past 2-3 days. Starting from yesterday noted to have some bright red blood mixed with the stool was noted by another daughter while she was changing the pt. Last night Pt was found to have large amount of black stool, another episode observed this AM. Daughter denied no other symptom, such as fever/chills, no N/V, no CP/SOB. Pt has been having good appetite reported feeling well.   Ever since last discharge, pt is only on baby asa, no recent use of other blood thinners or NSAIds use.   Pt had never got endoscopy or colonoscopy in  the past. She has been using holter monitor monthly, and sent report remotely to Cardiology Dr. garcia office, last time checked in May 2017.   In ER, Pt no acute distress. Was found BP elevated 233/83, s/p hydralazine 25 mg IV push. Pt remained calm, no distress, no more melena/rectal bleeding observed.   HOSPITAL COURSE;  patient was seen by GI and rec no intervention in this 89 yo F with Dementia.  She was treated with Protonix drip and transitioned to Po protonix bid after observed that H/H remained stable with Hgb at 12.  She was transitioned to her pureed diet.  No more bleeding observed on 6/23/17.  Cardiology consulted and rec holding ASA.  She was d/c home oFF asa 81 , and oN protonix 40 mg po bid. She is to follow out patient with PCP/GI and Cardiology.  Daughter Maritza agree for home on 6/24/17

## 2017-06-23 NOTE — PROGRESS NOTE ADULT - ASSESSMENT
Impression:  1) Hematochezia - likely lower GI bleeding - ddx Diverticulosis, Angioectasia, Colon Cancer, Hemorrhoids.  2) Black Stool - suspect upper GI bleeding vs right sided colonic bleeding - ddx PUD, NSAID Gastropathy, Angioectasia.  3) Bradycardia - with Pacemaker  4) Atrial Fibrillation    Plan:  - clear liquids  - Protonix IV high dose infusion  - Monitor hemoglobin  - Have discussed with daughter regarding options and endoscopic procedures ---> daughter preferred to defer endoscopic procedures, and to continue Protonix with conservative clinical management.  - Monitor clinically Impression:  1) Hematochezia - likely lower GI bleeding - ddx Diverticulosis, Angioectasia, Colon Cancer, Hemorrhoids.  2) Black Stool - suspect upper GI bleeding vs right sided colonic bleeding - ddx PUD, NSAID Gastropathy, Angioectasia.  3) Bradycardia - with Pacemaker  4) Atrial Fibrillation    Plan:  - clear liquids  - May transition to pantoprazole 40mg bid  - Monitor hemoglobin  - Have discussed with daughter regarding options and endoscopic procedures ---> daughter preferred to defer endoscopic procedures, and to continue pantoprazole with conservative clinical management.  - Monitor clinically Impression:  1) Hematochezia - likely lower GI bleeding which appears to be self limited - ddx Diverticulosis, Angioectasia, Colon Cancer, Hemorrhoids.  2) Black Stool - suspect upper GI bleeding vs right sided colonic bleeding - ddx PUD, NSAID Gastropathy, Angioectasia.  3) Bradycardia - with Pacemaker  4) Atrial Fibrillation    Plan:  - full liquids diet  - May transition to pantoprazole 40mg bid  - Monitor hemoglobin  - Have discussed with daughter regarding options and endoscopic procedures ---> daughter preferred to defer endoscopic procedures, and to continue pantoprazole with conservative clinical management.  - Monitor clinically

## 2017-06-23 NOTE — DISCHARGE NOTE ADULT - PATIENT PORTAL LINK FT
“You can access the Follow[employer] Patient Portal, offered by [facility], by registering with the following website: [email or web address]”

## 2017-06-23 NOTE — PROGRESS NOTE ADULT - ATTENDING COMMENTS
I have seen and evaluated the patient with the GI Fellow and GI Team.  I agree with the findings, formulation and plan of care as documented in the resident / fellows note, except as noted.

## 2017-06-23 NOTE — PROGRESS NOTE ADULT - PROBLEM SELECTOR PLAN 1
GI PLAN NO SCOPING- they will do official NOTE. c/w Protonix drip.  CBC Q12- monitor for Acute blood loss and need for transfusion.

## 2017-06-23 NOTE — DISCHARGE NOTE ADULT - CARE PROVIDER_API CALL
Stuart Adair (MD), Gastroenterology; Internal Medicine  [id number] [place] [address]  Phone: [phone]  Fax: [phone]    PCP Dr Sutton,   Phone: [phone]  Fax: (   )    -    Mahogany Sanchez), Cardiovascular Disease  [id number] [place] [address]  Phone: [phone]  Fax: [phone]

## 2017-06-23 NOTE — SWALLOW BEDSIDE ASSESSMENT ADULT - SWALLOW EVAL: ORAL MUSCULATURE
pt unable to follow low-level, verbally presented directives at this time/unable to assess due to poor participation/comprehension

## 2017-06-23 NOTE — SWALLOW BEDSIDE ASSESSMENT ADULT - COMMENTS
Pt was alert though confusion noted for a clinical assessment of swallow function this pm. Per charting, pt is an 87 y/o female with a PMHx significant for dementia, B/L hip replacements, Afib (not on AC due to previous ICH), symptomatic bradycardia (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), COPD, CKDIII admitted to [facility] for gastrointestinal hemorrhage. Pt is familiar to this service as she was seen for a cinesophgram on 6/9/2017 at which time a diet of puree and honey thick liquid textures was recommended, given silent penetration with nectar thick and thin liquid textures noted during time of assessment (please see report for details). Recent CXR completed on 6/21/2017 revealed "No  focal  patchy  airspace  consolidations,  pleural effusions,  pneumothorax."

## 2017-06-23 NOTE — DISCHARGE NOTE ADULT - MEDICATION SUMMARY - MEDICATIONS TO STOP TAKING
I will STOP taking the medications listed below when I get home from the hospital:    aspirin 81 mg oral tablet, chewable  -- 1 tab(s) by mouth once a day

## 2017-06-23 NOTE — PROGRESS NOTE ADULT - ASSESSMENT
Patient is a 88y old  Female who presents with a chief complaint of melena (21 Jun 2017 15:13).  Patient seen with Grand daughter Snehal at bedside - she want us to call patients daughter Maritza at [phone].  Snehal noted patient had small blood per Rectum on Tuesday 6/20, and Large blood yesturday 6/21/17- melena and BRBPR - so took her to ED.

## 2017-06-23 NOTE — DISCHARGE NOTE ADULT - CARE PROVIDERS DIRECT ADDRESSES
[email or web address]_Unknown,[email or web address]

## 2017-06-23 NOTE — PROGRESS NOTE ADULT - PROBLEM SELECTOR PLAN 4
Rate controlled. Cardioilogy is Dr SHIRIN Sanchez.  Asa on HOLD.  CARDIOLOGY CONSULT CALLED- ?? NOT RESUME ASA ON D/ C pump Rate controlled. Cardioilogy is Dr SHIRIN Sanchez.  Asa on HOLD.  CARDIOLOGY CONSULT CALLED- Recommended stopping ASA in this patient with GI bleed - now stable OFF aspirin.

## 2017-06-23 NOTE — SWALLOW BEDSIDE ASSESSMENT ADULT - ASR SWALLOW ASPIRATION MONITOR
fever/throat clearing/upper respiratory infection/position upright (90Y)/oral hygiene/cough/change of breathing pattern/gurgly voice/pneumonia

## 2017-06-23 NOTE — DISCHARGE NOTE ADULT - MEDICATION SUMMARY - MEDICATIONS TO TAKE
I will START or STAY ON the medications listed below when I get home from the hospital:    amLODIPine 2.5 mg oral tablet  -- 1 tab(s) by mouth once a day  -- Indication: For HTN    pantoprazole 40 mg oral delayed release tablet  -- 1 tab(s) by mouth 2 times a day  -- It is very important that you take or use this exactly as directed.  Do not skip doses or discontinue unless directed by your doctor.  Obtain medical advice before taking any non-prescription drugs as some may affect the action of this medication.  Swallow whole.  Do not crush.    -- Indication: For Prophylactic measure    levothyroxine 50 mcg (0.05 mg) oral tablet  -- 1 tab(s) by mouth once a day  -- Indication: For hypothyroidism

## 2017-06-23 NOTE — PROGRESS NOTE ADULT - SUBJECTIVE AND OBJECTIVE BOX
SUBJECTIVE:  - She had one stool mixed with red blood last evening. She had no bowel movements overnight  - She denies abdominal pain, nausea or vomiting    Review of Systems: negative except as above.    OBJECTIVE:    Vital Signs Last 24 Hrs  T(C): 37, Max: 37.3 (06-22 @ 21:42)  T(F): 98.6, Max: 99.1 (06-22 @ 21:42)  HR: 60 (60 - 82)  BP: 155/87 (142/90 - 157/95)  BP(mean): --  RR: 18 (18 - 20)  SpO2: 98% (96% - 98%)    PHYSICAL EXAM:  Constitutional: no acute distress  Eyes: no icterus  Neck: no masses, no LAD  Respiratory: normal inspiratory effort; no wheezing or crackles  Cardiovascular: RRR, normal S1/S2, no murmurs/rubs/gallops  Gastrointestinal: soft, nondistended, nontender, +BS  Extremities: no LE edema  Neurological: AAOx3, no asterixis  Skin: no rashes, bruises, petechiae    LABS:                        11.7   12.61 )-----------( 359      ( 23 Jun 2017 06:00 )             37.5     06-22    141  |  105  |  30<H>  ----------------------------<  113<H>  4.3   |  22  |  1.54<H>    Ca    9.3      22 Jun 2017 06:41    TPro  7.6  /  Alb  3.1<L>  /  TBili  0.4  /  DBili  x   /  AST  31  /  ALT  13  /  AlkPhos  90  06-21    LIVER FUNCTIONS - ( 21 Jun 2017 11:12 )  Alb: 3.1 g/dL / Pro: 7.6 g/dL / ALK PHOS: 90 u/L / ALT: 13 u/L / AST: 31 u/L / GGT: x

## 2017-06-23 NOTE — DISCHARGE NOTE ADULT - PLAN OF CARE
Pt will be from bleeding Follow up with your PCP in 1-2 weeks.   Call for an appointment Stop aspirin c/w norvasc intrinsic rate controlled. asa stop due to concern for gi bleed. Follow up with your PCP in 1-2 weeks.   Call for an appointment. Po Protonix 40 mg po BID . c/w Norvasc

## 2017-06-23 NOTE — DISCHARGE NOTE ADULT - PROVIDER TOKENS
TOKEN:'[id number]',FREE:[LAST:[PCP Dr Sutton],PHONE:[phone]],FAX:[(   )    -]],TOKEN:'[id number]'

## 2017-06-24 VITALS — DIASTOLIC BLOOD PRESSURE: 98 MMHG | SYSTOLIC BLOOD PRESSURE: 129 MMHG | HEART RATE: 69 BPM | RESPIRATION RATE: 18 BRPM

## 2017-06-24 LAB
HCT VFR BLD CALC: 37.1 % — SIGNIFICANT CHANGE UP (ref 34.5–45)
HGB BLD-MCNC: 11.4 G/DL — LOW (ref 11.5–15.5)
MCHC RBC-ENTMCNC: 27.5 PG — SIGNIFICANT CHANGE UP (ref 27–34)
MCHC RBC-ENTMCNC: 30.7 % — LOW (ref 32–36)
MCV RBC AUTO: 89.4 FL — SIGNIFICANT CHANGE UP (ref 80–100)
PLATELET # BLD AUTO: 333 K/UL — SIGNIFICANT CHANGE UP (ref 150–400)
PMV BLD: 10.7 FL — SIGNIFICANT CHANGE UP (ref 7–13)
RBC # BLD: 4.15 M/UL — SIGNIFICANT CHANGE UP (ref 3.8–5.2)
RBC # FLD: 17 % — HIGH (ref 10.3–14.5)
WBC # BLD: 11.66 K/UL — HIGH (ref 3.8–10.5)
WBC # FLD AUTO: 11.66 K/UL — HIGH (ref 3.8–10.5)

## 2017-06-24 PROCEDURE — 99238 HOSP IP/OBS DSCHRG MGMT 30/<: CPT

## 2017-06-24 RX ORDER — AMLODIPINE BESYLATE 2.5 MG/1
1 TABLET ORAL
Qty: 30 | Refills: 0 | OUTPATIENT
Start: 2017-06-24 | End: 2017-07-24

## 2017-06-24 RX ADMIN — AMLODIPINE BESYLATE 2.5 MILLIGRAM(S): 2.5 TABLET ORAL at 06:43

## 2017-06-24 RX ADMIN — Medication 50 MICROGRAM(S): at 06:43

## 2017-06-24 RX ADMIN — PANTOPRAZOLE SODIUM 40 MILLIGRAM(S): 20 TABLET, DELAYED RELEASE ORAL at 06:43

## 2017-06-24 RX ADMIN — PANTOPRAZOLE SODIUM 40 MILLIGRAM(S): 20 TABLET, DELAYED RELEASE ORAL at 17:23

## 2017-06-24 NOTE — PROGRESS NOTE ADULT - PROBLEM SELECTOR PLAN 4
Rate controlled. Cardioilogy is Dr SHIRIN Sanchez.  Asa on HOLD.  CARDIOLOGY CONSULT CALLED- Recommended stopping ASA in this patient with GI bleed - now stable OFF aspirin.

## 2017-06-24 NOTE — PROGRESS NOTE ADULT - ASSESSMENT
Patient is a 88y old  Female who presents with a chief complaint of melena (21 Jun 2017 15:13).  managed medically.  hemoglobin stable- for dc home

## 2017-06-24 NOTE — PROGRESS NOTE ADULT - PROBLEM SELECTOR PLAN 3
Stable . Patient is Calm and Cooperative

## 2017-06-24 NOTE — PROGRESS NOTE ADULT - SUBJECTIVE AND OBJECTIVE BOX
Patient is a 88y old  Female who presents with a chief complaint of melena- H/H stable- asa stopped. (23 Jun 2017 14:26)      SUBJECTIVE / OVERNIGHT EVENTS: feels ok  wants to go home    MEDICATIONS  (STANDING):  amLODIPine   Tablet 2.5milliGRAM(s) Oral daily  levothyroxine 50MICROGram(s) Oral daily  dextrose 5% + sodium chloride 0.9%. 1000milliLiter(s) IV Continuous <Continuous>  pantoprazole    Tablet 40milliGRAM(s) Oral two times a day before meals    MEDICATIONS  (PRN):      Meds ordered within last 24hours        Vital Signs Last 24 Hrs  T(C): 36.4, Max: 37.2 (06-24 @ 00:46)  HR: 65 (44 - 67)  BP: 143/67 (113/91 - 143/67)  RR: 18 (18 - 20)  SpO2: 96% (95% - 99%)  Wt(kg): --  CAPILLARY BLOOD GLUCOSE    I&O's Summary      PHYSICAL EXAM:  GENERAL: NAD  CHEST/LUNG: Clear to auscultation bilaterally; No wheeze  HEART: Regular rate and rhythm; No murmurs, rubs, or gallops  ABDOMEN: Soft, Nontender, Nondistended; Bowel sounds present  EXTREMITIES:  No clubbing, cyanosis, or edema      LABS:                        11.4   11.66 )-----------( 333      ( 24 Jun 2017 05:30 )             37.1     06-23    141  |  104  |  27<H>  ----------------------------<  88  4.5   |  16<L>  |  1.31<H>    Ca    8.6      23 Jun 2017 06:00                RADIOLOGY & ADDITIONAL TESTS:    Imaging Personally Reviewed:    Consultant(s) Notes Reviewed:      Care Discussed with Consultants/Other Providers:

## 2017-06-24 NOTE — PROGRESS NOTE ADULT - PROBLEM SELECTOR PROBLEM 3
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

## 2017-06-24 NOTE — PROGRESS NOTE ADULT - PROBLEM SELECTOR PLAN 1
GI PLAN NO SCOPING-  c/w Protonix drip.  CBC Q12- monitor for Acute blood loss and need for transfusion.

## 2017-07-28 ENCOUNTER — INPATIENT (INPATIENT)
Facility: HOSPITAL | Age: 82
LOS: 5 days | End: 2017-08-03
Attending: HOSPITALIST | Admitting: HOSPITALIST
Payer: MEDICARE

## 2017-07-28 VITALS
DIASTOLIC BLOOD PRESSURE: 104 MMHG | SYSTOLIC BLOOD PRESSURE: 159 MMHG | OXYGEN SATURATION: 94 % | RESPIRATION RATE: 20 BRPM | HEART RATE: 76 BPM

## 2017-07-28 DIAGNOSIS — Z96.643 PRESENCE OF ARTIFICIAL HIP JOINT, BILATERAL: Chronic | ICD-10-CM

## 2017-07-28 DIAGNOSIS — Z98.89 OTHER SPECIFIED POSTPROCEDURAL STATES: Chronic | ICD-10-CM

## 2017-07-28 DIAGNOSIS — J96.02 ACUTE RESPIRATORY FAILURE WITH HYPERCAPNIA: ICD-10-CM

## 2017-07-28 LAB
ALBUMIN SERPL ELPH-MCNC: 3.7 G/DL — SIGNIFICANT CHANGE UP (ref 3.3–5)
ALP SERPL-CCNC: 155 U/L — HIGH (ref 40–120)
ALT FLD-CCNC: 15 U/L — SIGNIFICANT CHANGE UP (ref 4–33)
APTT BLD: 33.6 SEC — SIGNIFICANT CHANGE UP (ref 27.5–37.4)
AST SERPL-CCNC: 28 U/L — SIGNIFICANT CHANGE UP (ref 4–32)
B PERT DNA SPEC QL NAA+PROBE: SIGNIFICANT CHANGE UP
BASE EXCESS BLDV CALC-SCNC: -0.4 MMOL/L — SIGNIFICANT CHANGE UP
BASE EXCESS BLDV CALC-SCNC: -1 MMOL/L — SIGNIFICANT CHANGE UP
BASOPHILS # BLD AUTO: 0.1 K/UL — SIGNIFICANT CHANGE UP (ref 0–0.2)
BASOPHILS NFR BLD AUTO: 1.3 % — SIGNIFICANT CHANGE UP (ref 0–2)
BILIRUB SERPL-MCNC: 0.8 MG/DL — SIGNIFICANT CHANGE UP (ref 0.2–1.2)
BLOOD GAS VENOUS - CREATININE: 1.52 MG/DL — HIGH (ref 0.5–1.3)
BLOOD GAS VENOUS - CREATININE: 1.59 MG/DL — HIGH (ref 0.5–1.3)
BUN SERPL-MCNC: 37 MG/DL — HIGH (ref 7–23)
C PNEUM DNA SPEC QL NAA+PROBE: NOT DETECTED — SIGNIFICANT CHANGE UP
CALCIUM SERPL-MCNC: 8.9 MG/DL — SIGNIFICANT CHANGE UP (ref 8.4–10.5)
CHLORIDE BLDV-SCNC: 104 MMOL/L — SIGNIFICANT CHANGE UP (ref 96–108)
CHLORIDE BLDV-SCNC: 108 MMOL/L — SIGNIFICANT CHANGE UP (ref 96–108)
CHLORIDE SERPL-SCNC: 103 MMOL/L — SIGNIFICANT CHANGE UP (ref 98–107)
CK MB BLD-MCNC: 3.61 NG/ML — SIGNIFICANT CHANGE UP (ref 1–4.7)
CK SERPL-CCNC: 45 U/L — SIGNIFICANT CHANGE UP (ref 25–170)
CO2 SERPL-SCNC: 21 MMOL/L — LOW (ref 22–31)
CREAT SERPL-MCNC: 1.57 MG/DL — HIGH (ref 0.5–1.3)
EOSINOPHIL # BLD AUTO: 0.17 K/UL — SIGNIFICANT CHANGE UP (ref 0–0.5)
EOSINOPHIL NFR BLD AUTO: 2.1 % — SIGNIFICANT CHANGE UP (ref 0–6)
FLUAV H1 2009 PAND RNA SPEC QL NAA+PROBE: NOT DETECTED — SIGNIFICANT CHANGE UP
FLUAV H1 RNA SPEC QL NAA+PROBE: NOT DETECTED — SIGNIFICANT CHANGE UP
FLUAV H3 RNA SPEC QL NAA+PROBE: NOT DETECTED — SIGNIFICANT CHANGE UP
FLUAV SUBTYP SPEC NAA+PROBE: SIGNIFICANT CHANGE UP
FLUBV RNA SPEC QL NAA+PROBE: NOT DETECTED — SIGNIFICANT CHANGE UP
GAS PNL BLDV: 135 MMOL/L — LOW (ref 136–146)
GAS PNL BLDV: 136 MMOL/L — SIGNIFICANT CHANGE UP (ref 136–146)
GLUCOSE BLDV-MCNC: 141 — HIGH (ref 70–99)
GLUCOSE BLDV-MCNC: 96 — SIGNIFICANT CHANGE UP (ref 70–99)
GLUCOSE SERPL-MCNC: 137 MG/DL — HIGH (ref 70–99)
HADV DNA SPEC QL NAA+PROBE: NOT DETECTED — SIGNIFICANT CHANGE UP
HCO3 BLDV-SCNC: 21 MMOL/L — SIGNIFICANT CHANGE UP (ref 20–27)
HCO3 BLDV-SCNC: 22 MMOL/L — SIGNIFICANT CHANGE UP (ref 20–27)
HCOV 229E RNA SPEC QL NAA+PROBE: NOT DETECTED — SIGNIFICANT CHANGE UP
HCOV HKU1 RNA SPEC QL NAA+PROBE: NOT DETECTED — SIGNIFICANT CHANGE UP
HCOV NL63 RNA SPEC QL NAA+PROBE: NOT DETECTED — SIGNIFICANT CHANGE UP
HCOV OC43 RNA SPEC QL NAA+PROBE: NOT DETECTED — SIGNIFICANT CHANGE UP
HCT VFR BLD CALC: 48.6 % — HIGH (ref 34.5–45)
HCT VFR BLDV CALC: 41.7 % — SIGNIFICANT CHANGE UP (ref 34.5–45)
HCT VFR BLDV CALC: 43.7 % — SIGNIFICANT CHANGE UP (ref 34.5–45)
HGB BLD-MCNC: 14.2 G/DL — SIGNIFICANT CHANGE UP (ref 11.5–15.5)
HGB BLDV-MCNC: 13.6 G/DL — SIGNIFICANT CHANGE UP (ref 11.5–15.5)
HGB BLDV-MCNC: 14.2 G/DL — SIGNIFICANT CHANGE UP (ref 11.5–15.5)
HMPV RNA SPEC QL NAA+PROBE: NOT DETECTED — SIGNIFICANT CHANGE UP
HPIV1 RNA SPEC QL NAA+PROBE: NOT DETECTED — SIGNIFICANT CHANGE UP
HPIV2 RNA SPEC QL NAA+PROBE: NOT DETECTED — SIGNIFICANT CHANGE UP
HPIV3 RNA SPEC QL NAA+PROBE: NOT DETECTED — SIGNIFICANT CHANGE UP
HPIV4 RNA SPEC QL NAA+PROBE: NOT DETECTED — SIGNIFICANT CHANGE UP
IMM GRANULOCYTES # BLD AUTO: 0.03 # — SIGNIFICANT CHANGE UP
IMM GRANULOCYTES NFR BLD AUTO: 0.4 % — SIGNIFICANT CHANGE UP (ref 0–1.5)
INR BLD: 1.07 — SIGNIFICANT CHANGE UP (ref 0.88–1.17)
LACTATE BLDV-MCNC: 2.5 MMOL/L — HIGH (ref 0.5–2)
LACTATE BLDV-MCNC: 2.9 MMOL/L — HIGH (ref 0.5–2)
LYMPHOCYTES # BLD AUTO: 2.41 K/UL — SIGNIFICANT CHANGE UP (ref 1–3.3)
LYMPHOCYTES # BLD AUTO: 30.5 % — SIGNIFICANT CHANGE UP (ref 13–44)
M PNEUMO DNA SPEC QL NAA+PROBE: NOT DETECTED — SIGNIFICANT CHANGE UP
MCHC RBC-ENTMCNC: 26.4 PG — LOW (ref 27–34)
MCHC RBC-ENTMCNC: 29.2 % — LOW (ref 32–36)
MCV RBC AUTO: 90.5 FL — SIGNIFICANT CHANGE UP (ref 80–100)
MONOCYTES # BLD AUTO: 0.75 K/UL — SIGNIFICANT CHANGE UP (ref 0–0.9)
MONOCYTES NFR BLD AUTO: 9.5 % — SIGNIFICANT CHANGE UP (ref 2–14)
NEUTROPHILS # BLD AUTO: 4.45 K/UL — SIGNIFICANT CHANGE UP (ref 1.8–7.4)
NEUTROPHILS NFR BLD AUTO: 56.2 % — SIGNIFICANT CHANGE UP (ref 43–77)
NRBC # FLD: 0.02 — SIGNIFICANT CHANGE UP
NT-PROBNP SERPL-SCNC: SIGNIFICANT CHANGE UP PG/ML
PCO2 BLDV: 53 MMHG — HIGH (ref 41–51)
PCO2 BLDV: 58 MMHG — HIGH (ref 41–51)
PH BLDV: 7.27 PH — LOW (ref 7.32–7.43)
PH BLDV: 7.29 PH — LOW (ref 7.32–7.43)
PLATELET # BLD AUTO: 206 K/UL — SIGNIFICANT CHANGE UP (ref 150–400)
PMV BLD: 10.4 FL — SIGNIFICANT CHANGE UP (ref 7–13)
PO2 BLDV: 28 MMHG — LOW (ref 35–40)
PO2 BLDV: 28 MMHG — LOW (ref 35–40)
POTASSIUM BLDV-SCNC: 4 MMOL/L — SIGNIFICANT CHANGE UP (ref 3.4–4.5)
POTASSIUM BLDV-SCNC: 4.2 MMOL/L — SIGNIFICANT CHANGE UP (ref 3.4–4.5)
POTASSIUM SERPL-MCNC: 4 MMOL/L — SIGNIFICANT CHANGE UP (ref 3.5–5.3)
POTASSIUM SERPL-SCNC: 4 MMOL/L — SIGNIFICANT CHANGE UP (ref 3.5–5.3)
PROT SERPL-MCNC: 8 G/DL — SIGNIFICANT CHANGE UP (ref 6–8.3)
PROTHROM AB SERPL-ACNC: 12 SEC — SIGNIFICANT CHANGE UP (ref 9.8–13.1)
RBC # BLD: 5.37 M/UL — HIGH (ref 3.8–5.2)
RBC # FLD: 19.4 % — HIGH (ref 10.3–14.5)
RSV RNA SPEC QL NAA+PROBE: NOT DETECTED — SIGNIFICANT CHANGE UP
RV+EV RNA SPEC QL NAA+PROBE: NOT DETECTED — SIGNIFICANT CHANGE UP
SAO2 % BLDV: 34.3 % — LOW (ref 60–85)
SAO2 % BLDV: 38.6 % — LOW (ref 60–85)
SODIUM SERPL-SCNC: 141 MMOL/L — SIGNIFICANT CHANGE UP (ref 135–145)
TROPONIN T SERPL-MCNC: < 0.06 NG/ML — SIGNIFICANT CHANGE UP (ref 0–0.06)
WBC # BLD: 7.91 K/UL — SIGNIFICANT CHANGE UP (ref 3.8–10.5)
WBC # FLD AUTO: 7.91 K/UL — SIGNIFICANT CHANGE UP (ref 3.8–10.5)

## 2017-07-28 PROCEDURE — 71010: CPT | Mod: 26,76

## 2017-07-28 RX ORDER — FENTANYL CITRATE 50 UG/ML
0.5 INJECTION INTRAVENOUS
Qty: 2500 | Refills: 0 | Status: DISCONTINUED | OUTPATIENT
Start: 2017-07-28 | End: 2017-07-29

## 2017-07-28 RX ORDER — IPRATROPIUM/ALBUTEROL SULFATE 18-103MCG
3 AEROSOL WITH ADAPTER (GRAM) INHALATION ONCE
Qty: 0 | Refills: 0 | Status: COMPLETED | OUTPATIENT
Start: 2017-07-28 | End: 2017-07-28

## 2017-07-28 RX ORDER — SUCCINYLCHOLINE CHLORIDE 100 MG/5ML
100 SYRINGE (ML) INTRAVENOUS ONCE
Qty: 0 | Refills: 0 | Status: COMPLETED | OUTPATIENT
Start: 2017-07-28 | End: 2017-07-28

## 2017-07-28 RX ORDER — LEVALBUTEROL 1.25 MG/.5ML
1.25 SOLUTION, CONCENTRATE RESPIRATORY (INHALATION) ONCE
Qty: 0 | Refills: 0 | Status: COMPLETED | OUTPATIENT
Start: 2017-07-28 | End: 2017-07-28

## 2017-07-28 RX ORDER — MAGNESIUM SULFATE 500 MG/ML
2 VIAL (ML) INJECTION ONCE
Qty: 0 | Refills: 0 | Status: COMPLETED | OUTPATIENT
Start: 2017-07-28 | End: 2017-07-28

## 2017-07-28 RX ORDER — FENTANYL CITRATE 50 UG/ML
50 INJECTION INTRAVENOUS ONCE
Qty: 0 | Refills: 0 | Status: DISCONTINUED | OUTPATIENT
Start: 2017-07-28 | End: 2017-07-29

## 2017-07-28 RX ORDER — FUROSEMIDE 40 MG
40 TABLET ORAL ONCE
Qty: 0 | Refills: 0 | Status: COMPLETED | OUTPATIENT
Start: 2017-07-28 | End: 2017-07-28

## 2017-07-28 RX ORDER — ETOMIDATE 2 MG/ML
20 INJECTION INTRAVENOUS ONCE
Qty: 0 | Refills: 0 | Status: COMPLETED | OUTPATIENT
Start: 2017-07-28 | End: 2017-07-28

## 2017-07-28 RX ADMIN — Medication 125 MILLIGRAM(S): at 19:19

## 2017-07-28 RX ADMIN — Medication 50 GRAM(S): at 19:57

## 2017-07-28 RX ADMIN — Medication 40 MILLIGRAM(S): at 21:33

## 2017-07-28 RX ADMIN — Medication 3 MILLILITER(S): at 19:46

## 2017-07-28 RX ADMIN — ETOMIDATE 20 MILLIGRAM(S): 2 INJECTION INTRAVENOUS at 23:27

## 2017-07-28 RX ADMIN — LEVALBUTEROL 1.25 MILLIGRAM(S): 1.25 SOLUTION, CONCENTRATE RESPIRATORY (INHALATION) at 20:10

## 2017-07-28 RX ADMIN — Medication 3 MILLILITER(S): at 19:19

## 2017-07-28 RX ADMIN — Medication 100 MILLIGRAM(S): at 23:29

## 2017-07-28 NOTE — ED ADULT TRIAGE NOTE - CHIEF COMPLAINT QUOTE
Pt c/o sudden onset of  sob ,labored breathing , unable to speak in full sentences ,  headache and throat pain...Denies chest pain, dizziness

## 2017-07-28 NOTE — ED PROVIDER NOTE - OBJECTIVE STATEMENT
88 year old F h/o dementia, B/L hip replacements, Afib (not on AC due to previous ICH), symptomatic bradycardia (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), COPD, CKD, bibems for worsening respiratory distress. Per EMS who obtained history from daughter patient is SOB at baseline but acutely worsened today. No known fevers. +cough. No meds given by EMS.

## 2017-07-28 NOTE — ED PROVIDER NOTE - PROGRESS NOTE DETAILS
[facility] recommends 40mg lasix IV Olesya LÓPEZ; Pt conitnues to [facility] and got worse and was not arousbal on stimua;tion, repeat vbg showed worsenign co2 retention, pt intubated for resp failure, before intubation, pt had st elevation type change on monitor, ekg showed pacing skies with pvcs and afibbut no significant ischemia, intubation was uneventful and done on first attempt, confirmed tube on cxr post intubation. Pt remained tachycardic to 120-130 POST INTUBATIONA ND THEN HAD DORP IN BP TO 42  systolic, given fluids and plan to give phenyephrine. pt accepted by [facility].Plan to also do ct head before transferring to [facility] as pt may have stroke form aifb and no ac and acs.

## 2017-07-28 NOTE — CONSULT NOTE ADULT - SUBJECTIVE AND OBJECTIVE BOX
CHIEF COMPLAINT: worsening dyspnea    HPI: Patient is an 87 yo F w/PMH of dementia, b/l hip replacements, Afib (not on AC secondary to previous ICH), symptomatic bradycardia (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), COPD, CKDIII, recently discharged on 6/9/17 for dysphagia.    PAST MEDICAL & SURGICAL HISTORY:  Symptomatic bradycardia  Obese  Pruritus  Anxiety  Atrial fibrillation and flutter  HLD (hyperlipidemia)  CKD (chronic kidney disease)  COPD (chronic obstructive pulmonary disease)  Arthritis  Benign essential HTN  Senile dementia with depression  Status post bilateral total hip replacement: several years ago  Status post placement of implantable loop recorder: 9/15  History of back surgery: x3, approimately 1980, 1985, 1991      FAMILY HISTORY:  No pertinent family history in first degree relatives      SOCIAL HISTORY:  Smoking: [ ] Never Smoked [ ] Former Smoker (__ packs x ___ years) [ ] Current Smoker  (__ packs x ___ years)  Substance Use: [ ] Never Used [ ] Used ____  EtOH Use:  Marital Status: [ ] Single [ ]  [ ]  [ ]   Sexual History:   Occupation:  Recent Travel:  Country of Birth:  Advance Directives:    Allergies    latex (Flushing (Skin))  No Known Drug Allergies    Intolerances    beta blockers (Hypotension)      HOME MEDICATIONS:    REVIEW OF SYSTEMS:  Constitutional: [ ] negative [ ] fevers [ ] chills [ ] weight loss [ ] weight gain  HEENT: [ ] negative [ ] dry eyes [ ] eye irritation [ ] postnasal drip [ ] nasal congestion  CV: [ ] negative  [ ] chest pain [ ] orthopnea [ ] palpitations [ ] murmur  Resp: [ ] negative [ ] cough [ ] shortness of breath [ ] dyspnea [ ] wheezing [ ] sputum [ ] hemoptysis  GI: [ ] negative [ ] nausea [ ] vomiting [ ] diarrhea [ ] constipation [ ] abd pain [ ] dysphagia   : [ ] negative [ ] dysuria [ ] nocturia [ ] hematuria [ ] increased urinary frequency  Musculoskeletal: [ ] negative [ ] back pain [ ] myalgias [ ] arthralgias [ ] fracture  Skin: [ ] negative [ ] rash [ ] itch  Neurological: [ ] negative [ ] headache [ ] dizziness [ ] syncope [ ] weakness [ ] numbness  Psychiatric: [ ] negative [ ] anxiety [ ] depression  Endocrine: [ ] negative [ ] diabetes [ ] thyroid problem  Hematologic/Lymphatic: [ ] negative [ ] anemia [ ] bleeding problem  Allergic/Immunologic: [ ] negative [ ] itchy eyes [ ] nasal discharge [ ] hives [ ] angioedema  [ ] All other systems negative  [ ] Unable to assess ROS because ________    OBJECTIVE:  ICU Vital Signs Last 24 Hrs  T(C): 36.4 (28 Jul 2017 19:55), Max: 36.4 (28 Jul 2017 19:55)  T(F): 97.6 (28 Jul 2017 19:55), Max: 97.6 (28 Jul 2017 19:55)  HR: 69 (28 Jul 2017 20:41) (69 - 91)  BP: 126/66 (28 Jul 2017 20:52) (126/66 - 174/88)  BP(mean): --  ABP: --  ABP(mean): --  RR: 27 (28 Jul 2017 20:41) (20 - 35)  SpO2: 100% (28 Jul 2017 20:41) (94% - 100%)        CAPILLARY BLOOD GLUCOSE          PHYSICAL EXAM:  General:   HEENT:   Lymph Nodes:  Neck:   Respiratory:   Cardiovascular:   Abdomen:   Extremities:   Skin:   Neurological:  Psychiatry:    LINES:     HOSPITAL MEDICATIONS:      furosemide   Injectable 40 milliGRAM(s) IV Push Once                              LABS:                        14.2   7.91  )-----------( 206      ( 28 Jul 2017 18:46 )             48.6     Hgb Trend: 14.2<--  07-28    141  |  103  |  37<H>  ----------------------------<  137<H>  4.0   |  21<L>  |  1.57<H>    Ca    8.9      28 Jul 2017 18:46    TPro  8.0  /  Alb  3.7  /  TBili  0.8  /  DBili  x   /  AST  28  /  ALT  15  /  AlkPhos  155<H>  07-28    Creatinine Trend: 1.57<--  PT/INR - ( 28 Jul 2017 18:46 )   PT: 12.0 SEC;   INR: 1.07          PTT - ( 28 Jul 2017 18:46 )  PTT:33.6 SEC      Venous Blood Gas:  07-28 @ 18:46  7.29/53/28/21/38.6  VBG Lactate: 2.9      MICROBIOLOGY:     RADIOLOGY:  [ ] Reviewed and interpreted by me    EKG: CHIEF COMPLAINT: worsening dyspnea    HPI: Patient is an 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), COPD, CKD III, recent admission in June for UGI bleed who presented with worsening SOB per daughter. Per the daughter, the patient has been having progressively worsening SOB today, so EMS called. Patient with SOB with exertion at baseline per daughter. She is a former smoker, having quit "many years ago." Unclear history of COPD, patient not on O2 or BiPAP at home. Patient started on BiPAP on arrival to ED. VBG revealed a pH of 7.29, pCO2 of 53. Pro-Bnp 52233. Lactate 2.9. CXR showing reticular opacities in bilateral lung bases, unchanged from prior, may represent pulmonary fibrosis. Given DuoNebs x 2, Xopenex x 1, Solumedrol 125 x 1 and 2g MgSO4.    PAST MEDICAL & SURGICAL HISTORY:  Symptomatic bradycardia  Obese  Pruritus  Anxiety  Atrial fibrillation and flutter  HLD (hyperlipidemia)  CKD (chronic kidney disease)  COPD (chronic obstructive pulmonary disease)  Arthritis  Benign essential HTN  Senile dementia with depression  Status post bilateral total hip replacement: several years ago  Status post placement of implantable loop recorder: 9/15  History of back surgery: x3, approimately 1980, 1985, 1991      FAMILY HISTORY:  No pertinent family history in first degree relatives      SOCIAL HISTORY:  Fomer smoker, no EtOH, no recreational drugs. Patient is wheelchair bound at baseline and AAOx1 at baseline.      Allergies  latex (Flushing (Skin))  No Known Drug Allergies    Intolerances  beta blockers (Hypotension)    HOME MEDICATIONS: Amlodipine 1.5 QD, Protonix 40 BID, Levothyroxine 50 QD    REVIEW OF SYSTEMS:  Constitutional: [ ] negative [ ] fevers [ ] chills [ ] weight loss [ ] weight gain  HEENT: [ ] negative [ ] dry eyes [ ] eye irritation [ ] postnasal drip [ ] nasal congestion  CV: [ ] negative  [ ] chest pain [ ] orthopnea [ ] palpitations [ ] murmur  Resp: [ ] negative [ ] cough [ ] shortness of breath [ ] dyspnea [ ] wheezing [ ] sputum [ ] hemoptysis  GI: [ ] negative [ ] nausea [ ] vomiting [ ] diarrhea [ ] constipation [ ] abd pain [ ] dysphagia   : [ ] negative [ ] dysuria [ ] nocturia [ ] hematuria [ ] increased urinary frequency  Musculoskeletal: [ ] negative [ ] back pain [ ] myalgias [ ] arthralgias [ ] fracture  Skin: [ ] negative [ ] rash [ ] itch  Neurological: [ ] negative [ ] headache [ ] dizziness [ ] syncope [ ] weakness [ ] numbness  Psychiatric: [ ] negative [ ] anxiety [ ] depression  Endocrine: [ ] negative [ ] diabetes [ ] thyroid problem  Hematologic/Lymphatic: [ ] negative [ ] anemia [ ] bleeding problem  Allergic/Immunologic: [ ] negative [ ] itchy eyes [ ] nasal discharge [ ] hives [ ] angioedema  [ ] All other systems negative  [x] Unable to assess ROS because patient lethargic on BiPAP    OBJECTIVE:  ICU Vital Signs Last 24 Hrs  T(F): 97.6 (28 Jul 2017 19:55), Max: 97.6 (28 Jul 2017 19:55)  HR: 69 (28 Jul 2017 20:41) (69 - 91)  BP: 126/66 (28 Jul 2017 20:52) (126/66 - 174/88)  RR: 27 (28 Jul 2017 20:41) (20 - 35)  SpO2: 100% (28 Jul 2017 20:41) (94% - 100%)    PHYSICAL EXAM:  General: well-developed, lethargic  HEENT: sclera clear  Neck: supple  Respiratory: clear to auscultation anteriorly, BiPAP in place  Cardiovascular: +S1, S2, RRR  Abdomen: soft, NTTP, +BS  Extremities: bilateral LE edema  Skin: no rashes  Neurological: lethargic, arousable    LINES: [facility] MEDICATIONS:  furosemide   Injectable 40 milliGRAM(s) IV Push Once      LABS:                        14.2   7.91  )-----------( 206      ( 28 Jul 2017 18:46 )             48.6     Hgb Trend: 14.2<--  07-28    141  |  103  |  37<H>  ----------------------------<  137<H>  4.0   |  21<L>  |  1.57<H>    Ca    8.9      28 Jul 2017 18:46    TPro  8.0  /  Alb  3.7  /  TBili  0.8  /  DBili  x   /  AST  28  /  ALT  15  /  AlkPhos  155<H>  07-28    Creatinine Trend: 1.57<--  PT/INR - ( 28 Jul 2017 18:46 )   PT: 12.0 SEC;   INR: 1.07          PTT - ( 28 Jul 2017 18:46 )  PTT:33.6 SEC      Venous Blood Gas:  07-28 @ 18:46  7.29/53/28/21/38.6  VBG Lactate: 2.9      MICROBIOLOGY:     RADIOLOGY:  [ ] Reviewed and interpreted by me    EKG:

## 2017-07-28 NOTE — ED PROVIDER NOTE - MEDICAL DECISION MAKING DETAILS
88 F multiple medical problems with acute worsening of respiratory distress today. On arrival patient 80% on RA, and a-fib. Placed on O2 NC 4L, improvement to 98%. With diffuse wheezing, possible COPD exacerbation. Will check labs, cxr, duonebs  (slowly as initially rapid a-fib), steroids, Mg. If no improvement, low threshold for bipap.

## 2017-07-28 NOTE — ED ADULT NURSE REASSESSMENT NOTE - NS ED NURSE REASSESS COMMENT FT1
ER pt coming with diff. breathing/SOB wheezing, tachypneic 24-36 b/min, placed on CM Afib.Hr. 88-78b/min labs sent, S.L. to right hand with 20g angio cath. 1 dose of Resp. Tx given some result, Resp. from 36 to 22 after Tx. at times pt tachypneic and Oxygen Sat 95-97% in RA.  At times Oxygen Sat drop to 92, skin intact, pt is obese but  AOX 2,  CXRay done,   Pending dispo.    Kelsey Bey RN (facilitator)

## 2017-07-28 NOTE — CONSULT NOTE ADULT - PROBLEM SELECTOR RECOMMENDATION 9
- c/w BiPAP, will reassess after 2 hours of BiPAP in place  - check repeat ABG or VBG  - consider Lasix in setting of possible pulmonary edema  - patient likely chronically aspirating given h/o dysphagia and dementia - c/w BiPAP, will reassess after 2 hours of BiPAP in place  - check repeat ABG or VBG  - consider Lasix in setting of possible pulmonary edema  - patient likely chronically aspirating given h/o dysphagia and dementia  - goals of care discussed with daughterMaritza at [phone], patient is full code

## 2017-07-28 NOTE — ED PROVIDER NOTE - ATTENDING CONTRIBUTION TO CARE
Olesya LÓPEZ- 89 Y/O F with h/o afib not on ac, chf, copd, dementia p/w worsenign sob for few days w/o fever, chills, cough. No recent travel or sick contact, pt is not ambualtory at baseline    pt is alert, tachypneic female, using accessory muscle, gasping for air, speakign in full sentences  btu gets sob whiel speaking , b/l profuse wheezing, with poor air entry, s1s2 irreg, midl tachy, abd soft, nt  nd, neuro exam alert, oriented x2 , cn 2-12 intact, skin warm, dry, mod turgor, no cyanosis, no jvd    plan to treat with duonebs, solumedrol, magnesium, will get bipap for resp distress, bedside sono showed diffuse b lines with engorgement on cxr, will treat as acute chf exacerbation and admit

## 2017-07-29 DIAGNOSIS — R94.31 ABNORMAL ELECTROCARDIOGRAM [ECG] [EKG]: ICD-10-CM

## 2017-07-29 DIAGNOSIS — N18.9 CHRONIC KIDNEY DISEASE, UNSPECIFIED: ICD-10-CM

## 2017-07-29 DIAGNOSIS — I48.91 UNSPECIFIED ATRIAL FIBRILLATION: ICD-10-CM

## 2017-07-29 DIAGNOSIS — T17.908A UNSPECIFIED FOREIGN BODY IN RESPIRATORY TRACT, PART UNSPECIFIED CAUSING OTHER INJURY, INITIAL ENCOUNTER: ICD-10-CM

## 2017-07-29 DIAGNOSIS — R06.02 SHORTNESS OF BREATH: ICD-10-CM

## 2017-07-29 LAB
ALBUMIN SERPL ELPH-MCNC: 3.1 G/DL — LOW (ref 3.3–5)
ALP SERPL-CCNC: 136 U/L — HIGH (ref 40–120)
ALT FLD-CCNC: 17 U/L — SIGNIFICANT CHANGE UP (ref 4–33)
APPEARANCE UR: CLEAR — SIGNIFICANT CHANGE UP
AST SERPL-CCNC: 32 U/L — SIGNIFICANT CHANGE UP (ref 4–32)
BACTERIA # UR AUTO: SIGNIFICANT CHANGE UP
BASE EXCESS BLDA CALC-SCNC: -4.6 MMOL/L — SIGNIFICANT CHANGE UP
BASE EXCESS BLDA CALC-SCNC: -4.9 MMOL/L — SIGNIFICANT CHANGE UP
BASOPHILS # BLD AUTO: 0.02 K/UL — SIGNIFICANT CHANGE UP (ref 0–0.2)
BASOPHILS NFR BLD AUTO: 0.4 % — SIGNIFICANT CHANGE UP (ref 0–2)
BILIRUB SERPL-MCNC: 0.9 MG/DL — SIGNIFICANT CHANGE UP (ref 0.2–1.2)
BILIRUB UR-MCNC: NEGATIVE — SIGNIFICANT CHANGE UP
BLOOD UR QL VISUAL: HIGH
BUN SERPL-MCNC: 38 MG/DL — HIGH (ref 7–23)
CALCIUM SERPL-MCNC: 8.2 MG/DL — LOW (ref 8.4–10.5)
CHLORIDE BLDA-SCNC: 110 MMOL/L — HIGH (ref 96–108)
CHLORIDE BLDA-SCNC: 111 MMOL/L — HIGH (ref 96–108)
CHLORIDE SERPL-SCNC: 106 MMOL/L — SIGNIFICANT CHANGE UP (ref 98–107)
CK SERPL-CCNC: 32 U/L — SIGNIFICANT CHANGE UP (ref 25–170)
CO2 SERPL-SCNC: 18 MMOL/L — LOW (ref 22–31)
COLOR SPEC: YELLOW — SIGNIFICANT CHANGE UP
CREAT BLDA-MCNC: 1.55 MG/DL — HIGH (ref 0.5–1.3)
CREAT BLDA-MCNC: 1.57 MG/DL — HIGH (ref 0.5–1.3)
CREAT SERPL-MCNC: 1.6 MG/DL — HIGH (ref 0.5–1.3)
EOSINOPHIL # BLD AUTO: 0 K/UL — SIGNIFICANT CHANGE UP (ref 0–0.5)
EOSINOPHIL NFR BLD AUTO: 0 % — SIGNIFICANT CHANGE UP (ref 0–6)
GLUCOSE BLDA-MCNC: 154 MG/DL — HIGH (ref 70–99)
GLUCOSE BLDA-MCNC: 162 MG/DL — HIGH (ref 70–99)
GLUCOSE SERPL-MCNC: 151 MG/DL — HIGH (ref 70–99)
GLUCOSE UR-MCNC: NEGATIVE — SIGNIFICANT CHANGE UP
GRAM STN SPT: SIGNIFICANT CHANGE UP
HCO3 BLDA-SCNC: 20 MMOL/L — LOW (ref 22–26)
HCO3 BLDA-SCNC: 21 MMOL/L — LOW (ref 22–26)
HCT VFR BLD CALC: 45.1 % — HIGH (ref 34.5–45)
HCT VFR BLDA CALC: 40.5 % — SIGNIFICANT CHANGE UP (ref 34.5–46.5)
HCT VFR BLDA CALC: 41 % — SIGNIFICANT CHANGE UP (ref 34.5–46.5)
HGB BLD-MCNC: 13.2 G/DL — SIGNIFICANT CHANGE UP (ref 11.5–15.5)
HGB BLDA-MCNC: 13.2 G/DL — SIGNIFICANT CHANGE UP (ref 11.5–15.5)
HGB BLDA-MCNC: 13.3 G/DL — SIGNIFICANT CHANGE UP (ref 11.5–15.5)
HYALINE CASTS # UR AUTO: SIGNIFICANT CHANGE UP (ref 0–?)
IMM GRANULOCYTES # BLD AUTO: 0.04 # — SIGNIFICANT CHANGE UP
IMM GRANULOCYTES NFR BLD AUTO: 0.9 % — SIGNIFICANT CHANGE UP (ref 0–1.5)
KETONES UR-MCNC: NEGATIVE — SIGNIFICANT CHANGE UP
LACTATE BLDA-SCNC: 1.9 MMOL/L — SIGNIFICANT CHANGE UP (ref 0.5–2)
LACTATE BLDA-SCNC: 2.5 MMOL/L — HIGH (ref 0.5–2)
LEUKOCYTE ESTERASE UR-ACNC: HIGH
LYMPHOCYTES # BLD AUTO: 0.38 K/UL — LOW (ref 1–3.3)
LYMPHOCYTES # BLD AUTO: 8.2 % — LOW (ref 13–44)
MAGNESIUM SERPL-MCNC: 2.6 MG/DL — SIGNIFICANT CHANGE UP (ref 1.6–2.6)
MCHC RBC-ENTMCNC: 26.9 PG — LOW (ref 27–34)
MCHC RBC-ENTMCNC: 29.3 % — LOW (ref 32–36)
MCV RBC AUTO: 91.9 FL — SIGNIFICANT CHANGE UP (ref 80–100)
MONOCYTES # BLD AUTO: 0.03 K/UL — SIGNIFICANT CHANGE UP (ref 0–0.9)
MONOCYTES NFR BLD AUTO: 0.6 % — LOW (ref 2–14)
MUCOUS THREADS # UR AUTO: SIGNIFICANT CHANGE UP
NEUTROPHILS # BLD AUTO: 4.18 K/UL — SIGNIFICANT CHANGE UP (ref 1.8–7.4)
NEUTROPHILS NFR BLD AUTO: 89.9 % — HIGH (ref 43–77)
NITRITE UR-MCNC: NEGATIVE — SIGNIFICANT CHANGE UP
NON-SQ EPI CELLS # UR AUTO: <1 — SIGNIFICANT CHANGE UP
NRBC # FLD: 0 — SIGNIFICANT CHANGE UP
PCO2 BLDA: 31 MMHG — LOW (ref 32–48)
PCO2 BLDA: 47 MMHG — SIGNIFICANT CHANGE UP (ref 32–48)
PH BLDA: 7.28 PH — LOW (ref 7.35–7.45)
PH BLDA: 7.4 PH — SIGNIFICANT CHANGE UP (ref 7.35–7.45)
PH UR: 6 — SIGNIFICANT CHANGE UP (ref 4.6–8)
PHOSPHATE SERPL-MCNC: 4.6 MG/DL — HIGH (ref 2.5–4.5)
PLATELET # BLD AUTO: 181 K/UL — SIGNIFICANT CHANGE UP (ref 150–400)
PMV BLD: 10.7 FL — SIGNIFICANT CHANGE UP (ref 7–13)
PO2 BLDA: 132 MMHG — HIGH (ref 83–108)
PO2 BLDA: 95 MMHG — SIGNIFICANT CHANGE UP (ref 83–108)
POTASSIUM BLDA-SCNC: 3.7 MMOL/L — SIGNIFICANT CHANGE UP (ref 3.4–4.5)
POTASSIUM BLDA-SCNC: 3.7 MMOL/L — SIGNIFICANT CHANGE UP (ref 3.4–4.5)
POTASSIUM SERPL-MCNC: 4 MMOL/L — SIGNIFICANT CHANGE UP (ref 3.5–5.3)
POTASSIUM SERPL-SCNC: 4 MMOL/L — SIGNIFICANT CHANGE UP (ref 3.5–5.3)
PROT SERPL-MCNC: 6.7 G/DL — SIGNIFICANT CHANGE UP (ref 6–8.3)
PROT UR-MCNC: 100 — HIGH
RBC # BLD: 4.91 M/UL — SIGNIFICANT CHANGE UP (ref 3.8–5.2)
RBC # FLD: 18.5 % — HIGH (ref 10.3–14.5)
RBC CASTS # UR COMP ASSIST: SIGNIFICANT CHANGE UP (ref 0–?)
SAO2 % BLDA: 96.1 % — SIGNIFICANT CHANGE UP (ref 95–99)
SAO2 % BLDA: 99.4 % — HIGH (ref 95–99)
SODIUM BLDA-SCNC: 133 MMOL/L — LOW (ref 136–146)
SODIUM BLDA-SCNC: 133 MMOL/L — LOW (ref 136–146)
SODIUM SERPL-SCNC: 139 MMOL/L — SIGNIFICANT CHANGE UP (ref 135–145)
SP GR SPEC: 1.01 — SIGNIFICANT CHANGE UP (ref 1–1.03)
SPECIMEN SOURCE: SIGNIFICANT CHANGE UP
SQUAMOUS # UR AUTO: SIGNIFICANT CHANGE UP
TROPONIN T SERPL-MCNC: < 0.06 NG/ML — SIGNIFICANT CHANGE UP (ref 0–0.06)
UROBILINOGEN FLD QL: NORMAL E.U. — SIGNIFICANT CHANGE UP (ref 0.1–0.2)
WBC # BLD: 4.65 K/UL — SIGNIFICANT CHANGE UP (ref 3.8–10.5)
WBC # FLD AUTO: 4.65 K/UL — SIGNIFICANT CHANGE UP (ref 3.8–10.5)
WBC CLUMPS #/AREA URNS HPF: PRESENT — HIGH (ref 0–?)
WBC UR QL: >50 — HIGH (ref 0–?)

## 2017-07-29 PROCEDURE — 99232 SBSQ HOSP IP/OBS MODERATE 35: CPT

## 2017-07-29 PROCEDURE — 99291 CRITICAL CARE FIRST HOUR: CPT

## 2017-07-29 PROCEDURE — 70450 CT HEAD/BRAIN W/O DYE: CPT | Mod: 26

## 2017-07-29 PROCEDURE — 71010: CPT | Mod: 26,76

## 2017-07-29 RX ORDER — PHENYLEPHRINE HYDROCHLORIDE 10 MG/ML
1 INJECTION INTRAVENOUS
Qty: 160 | Refills: 0 | Status: DISCONTINUED | OUTPATIENT
Start: 2017-07-29 | End: 2017-07-29

## 2017-07-29 RX ORDER — HEPARIN SODIUM 5000 [USP'U]/ML
5000 INJECTION INTRAVENOUS; SUBCUTANEOUS EVERY 8 HOURS
Qty: 0 | Refills: 0 | Status: DISCONTINUED | OUTPATIENT
Start: 2017-07-29 | End: 2017-08-03

## 2017-07-29 RX ORDER — LEVOTHYROXINE SODIUM 125 MCG
25 TABLET ORAL DAILY
Qty: 0 | Refills: 0 | Status: DISCONTINUED | OUTPATIENT
Start: 2017-07-29 | End: 2017-07-29

## 2017-07-29 RX ORDER — PIPERACILLIN AND TAZOBACTAM 4; .5 G/20ML; G/20ML
3.38 INJECTION, POWDER, LYOPHILIZED, FOR SOLUTION INTRAVENOUS ONCE
Qty: 0 | Refills: 0 | Status: COMPLETED | OUTPATIENT
Start: 2017-07-29 | End: 2017-07-29

## 2017-07-29 RX ORDER — LEVOTHYROXINE SODIUM 125 MCG
50 TABLET ORAL DAILY
Qty: 0 | Refills: 0 | Status: DISCONTINUED | OUTPATIENT
Start: 2017-07-29 | End: 2017-07-29

## 2017-07-29 RX ORDER — VANCOMYCIN HCL 1 G
1000 VIAL (EA) INTRAVENOUS ONCE
Qty: 0 | Refills: 0 | Status: COMPLETED | OUTPATIENT
Start: 2017-07-29 | End: 2017-07-29

## 2017-07-29 RX ORDER — PROPOFOL 10 MG/ML
20 INJECTION, EMULSION INTRAVENOUS
Qty: 1000 | Refills: 0 | Status: DISCONTINUED | OUTPATIENT
Start: 2017-07-29 | End: 2017-07-30

## 2017-07-29 RX ORDER — PIPERACILLIN AND TAZOBACTAM 4; .5 G/20ML; G/20ML
3.38 INJECTION, POWDER, LYOPHILIZED, FOR SOLUTION INTRAVENOUS EVERY 12 HOURS
Qty: 0 | Refills: 0 | Status: DISCONTINUED | OUTPATIENT
Start: 2017-07-29 | End: 2017-08-03

## 2017-07-29 RX ORDER — LEVOTHYROXINE SODIUM 125 MCG
50 TABLET ORAL DAILY
Qty: 0 | Refills: 0 | Status: DISCONTINUED | OUTPATIENT
Start: 2017-07-30 | End: 2017-07-30

## 2017-07-29 RX ADMIN — PIPERACILLIN AND TAZOBACTAM 25 GRAM(S): 4; .5 INJECTION, POWDER, LYOPHILIZED, FOR SOLUTION INTRAVENOUS at 14:25

## 2017-07-29 RX ADMIN — HEPARIN SODIUM 5000 UNIT(S): 5000 INJECTION INTRAVENOUS; SUBCUTANEOUS at 14:25

## 2017-07-29 RX ADMIN — HEPARIN SODIUM 5000 UNIT(S): 5000 INJECTION INTRAVENOUS; SUBCUTANEOUS at 06:22

## 2017-07-29 RX ADMIN — Medication 250 MILLIGRAM(S): at 05:24

## 2017-07-29 RX ADMIN — Medication 25 MICROGRAM(S): at 06:51

## 2017-07-29 RX ADMIN — FENTANYL CITRATE 50 MICROGRAM(S): 50 INJECTION INTRAVENOUS at 00:07

## 2017-07-29 RX ADMIN — PROPOFOL 8.05 MICROGRAM(S)/KG/MIN: 10 INJECTION, EMULSION INTRAVENOUS at 11:09

## 2017-07-29 RX ADMIN — PROPOFOL 8.05 MICROGRAM(S)/KG/MIN: 10 INJECTION, EMULSION INTRAVENOUS at 20:21

## 2017-07-29 RX ADMIN — PIPERACILLIN AND TAZOBACTAM 200 GRAM(S): 4; .5 INJECTION, POWDER, LYOPHILIZED, FOR SOLUTION INTRAVENOUS at 02:42

## 2017-07-29 RX ADMIN — FENTANYL CITRATE 3.5 MICROGRAM(S)/KG/HR: 50 INJECTION INTRAVENOUS at 00:07

## 2017-07-29 NOTE — H&P ADULT - PROBLEM SELECTOR PLAN 1
- patient with worsening hypercapnia despite BiPAP in the setting of COPD and likely chronic aspiration, lethargic, s/p intubation in ED  - c/w mechanical ventilation, daily CPAP trials  - repeat ABG

## 2017-07-29 NOTE — PROGRESS NOTE ADULT - SUBJECTIVE AND OBJECTIVE BOX
CHIEF COMPLAINT: worsening SOB    Interval Events:    REVIEW OF SYSTEMS:  Constitutional: [ ] negative [ ] fevers [ ] chills [ ] weight loss [ ] weight gain  HEENT: [ ] negative [ ] dry eyes [ ] eye irritation [ ] postnasal drip [ ] nasal congestion  CV: [ ] negative  [ ] chest pain [ ] orthopnea [ ] palpitations [ ] murmur  Resp: [ ] negative [ ] cough [ ] shortness of breath [ ] dyspnea [ ] wheezing [ ] sputum [ ] hemoptysis  GI: [ ] negative [ ] nausea [ ] vomiting [ ] diarrhea [ ] constipation [ ] abd pain [ ] dysphagia   : [ ] negative [ ] dysuria [ ] nocturia [ ] hematuria [ ] increased urinary frequency  Musculoskeletal: [ ] negative [ ] back pain [ ] myalgias [ ] arthralgias [ ] fracture  Skin: [ ] negative [ ] rash [ ] itch  Neurological: [ ] negative [ ] headache [ ] dizziness [ ] syncope [ ] weakness [ ] numbness  Psychiatric: [ ] negative [ ] anxiety [ ] depression  Endocrine: [ ] negative [ ] diabetes [ ] thyroid problem  Hematologic/Lymphatic: [ ] negative [ ] anemia [ ] bleeding problem  Allergic/Immunologic: [ ] negative [ ] itchy eyes [ ] nasal discharge [ ] hives [ ] angioedema  [ ] All other systems negative  [ ] Unable to assess ROS because ________    OBJECTIVE:  ICU Vital Signs Last 24 Hrs  T(C): 36.1 (29 Jul 2017 01:00), Max: 36.4 (28 Jul 2017 19:55)  T(F): 97 (29 Jul 2017 01:00), Max: 97.6 (28 Jul 2017 19:55)  HR: 132 (29 Jul 2017 01:05) (69 - 138)  BP: 148/76 (29 Jul 2017 01:00) (49/40 - 174/88)  BP(mean): 95 (29 Jul 2017 01:00) (95 - 95)  ABP: --  ABP(mean): --  RR: 14 (29 Jul 2017 01:00) (14 - 35)  SpO2: 100% (29 Jul 2017 01:05) (94% - 100%)    Mode: AC/ CMV (Assist Control/ Continuous Mandatory Ventilation), RR (machine): 12, TV (machine): 380, FiO2: 30, PEEP: 5, ITime: 0.9, MAP: 8.7, PIP: 19    CAPILLARY BLOOD GLUCOSE          Physical Exam: General: well-developed, lethargic  HEENT: sclera clear  Neck: supple  Respiratory: clear to auscultation anteriorly, ETT in place  Cardiovascular: +S1, S2, RRR  Abdomen: soft, NTTP, +BS  Extremities: bilateral LE edema  Skin: no rashes  Neurological: lethargic, arousable    LINES:    HOSPITAL MEDICATIONS:    vancomycin  IVPB 1000 milliGRAM(s) IV Intermittent once  piperacillin/tazobactam IVPB. 3.375 Gram(s) IV Intermittent once  piperacillin/tazobactam IVPB. 3.375 Gram(s) IV Intermittent every 12 hours  levothyroxine Injectable 25 MICROGram(s) IV Push daily  fentaNYL   Infusion 0.5 MICROgram(s)/kG/Hr IV Continuous <Continuous>    LABS:                        14.2   7.91  )-----------( 206      ( 28 Jul 2017 18:46 )             48.6     Hgb Trend: 14.2<--  07-28    141  |  103  |  37<H>  ----------------------------<  137<H>  4.0   |  21<L>  |  1.57<H>    Ca    8.9      28 Jul 2017 18:46    TPro  8.0  /  Alb  3.7  /  TBili  0.8  /  DBili  x   /  AST  28  /  ALT  15  /  AlkPhos  155<H>  07-28    Creatinine Trend: 1.57<--  PT/INR - ( 28 Jul 2017 18:46 )   PT: 12.0 SEC;   INR: 1.07          PTT - ( 28 Jul 2017 18:46 )  PTT:33.6 SEC      Venous Blood Gas:  07-28 @ 22:25  7.27/58/28/22/34.3  VBG Lactate: 2.5  Venous Blood Gas:  07-28 @ 18:46  7.29/53/28/21/38.6  VBG Lactate: 2.9      MICROBIOLOGY:     RADIOLOGY:  [ ] Reviewed and interpreted by me    EKG: CHIEF COMPLAINT: worsening SOB    Interval Events:    REVIEW OF SYSTEMS:  Constitutional: [ ] negative [ ] fevers [ ] chills [ ] weight loss [ ] weight gain  HEENT: [ ] negative [ ] dry eyes [ ] eye irritation [ ] postnasal drip [ ] nasal congestion  CV: [ ] negative  [ ] chest pain [ ] orthopnea [ ] palpitations [ ] murmur  Resp: [ ] negative [ ] cough [ ] shortness of breath [ ] dyspnea [ ] wheezing [ ] sputum [ ] hemoptysis  GI: [ ] negative [ ] nausea [ ] vomiting [ ] diarrhea [ ] constipation [ ] abd pain [ ] dysphagia   : [ ] negative [ ] dysuria [ ] nocturia [ ] hematuria [ ] increased urinary frequency  Musculoskeletal: [ ] negative [ ] back pain [ ] myalgias [ ] arthralgias [ ] fracture  Skin: [ ] negative [ ] rash [ ] itch  Neurological: [ ] negative [ ] headache [ ] dizziness [ ] syncope [ ] weakness [ ] numbness  Psychiatric: [ ] negative [ ] anxiety [ ] depression  Endocrine: [ ] negative [ ] diabetes [ ] thyroid problem  Hematologic/Lymphatic: [ ] negative [ ] anemia [ ] bleeding problem  Allergic/Immunologic: [ ] negative [ ] itchy eyes [ ] nasal discharge [ ] hives [ ] angioedema  [ ] All other systems negative  [ x] Unable to assess ROS because _intubated and sedated._______    OBJECTIVE:  ICU Vital Signs Last 24 Hrs  T(C): 36.1 (29 Jul 2017 01:00), Max: 36.4 (28 Jul 2017 19:55)  T(F): 97 (29 Jul 2017 01:00), Max: 97.6 (28 Jul 2017 19:55)  HR: 132 (29 Jul 2017 01:05) (69 - 138)  BP: 148/76 (29 Jul 2017 01:00) (49/40 - 174/88)  BP(mean): 95 (29 Jul 2017 01:00) (95 - 95)  ABP: --  ABP(mean): --  RR: 14 (29 Jul 2017 01:00) (14 - 35)  SpO2: 100% (29 Jul 2017 01:05) (94% - 100%)    Mode: AC/ CMV (Assist Control/ Continuous Mandatory Ventilation), RR (machine): 12, TV (machine): 380, FiO2: 30, PEEP: 5, ITime: 0.9, MAP: 8.7, PIP: 19    CAPILLARY BLOOD GLUCOSE          Physical Exam: General: well-developed, lethargic  HEENT: sclera clear  Neck: supple  Respiratory: clear to auscultation anteriorly, ETT in place  Cardiovascular: +S1, S2, RRR  Abdomen: soft, NTTP, +BS  Extremities: bilateral LE edema  Skin: no rashes  Neurological: lethargic, arousable    LINES:    HOSPITAL MEDICATIONS:    vancomycin  IVPB 1000 milliGRAM(s) IV Intermittent once  piperacillin/tazobactam IVPB. 3.375 Gram(s) IV Intermittent once  piperacillin/tazobactam IVPB. 3.375 Gram(s) IV Intermittent every 12 hours  levothyroxine Injectable 25 MICROGram(s) IV Push daily  fentaNYL   Infusion 0.5 MICROgram(s)/kG/Hr IV Continuous <Continuous>    LABS:                        14.2   7.91  )-----------( 206      ( 28 Jul 2017 18:46 )             48.6     Hgb Trend: 14.2<--  07-28    141  |  103  |  37<H>  ----------------------------<  137<H>  4.0   |  21<L>  |  1.57<H>    Ca    8.9      28 Jul 2017 18:46    TPro  8.0  /  Alb  3.7  /  TBili  0.8  /  DBili  x   /  AST  28  /  ALT  15  /  AlkPhos  155<H>  07-28    Creatinine Trend: 1.57<--  PT/INR - ( 28 Jul 2017 18:46 )   PT: 12.0 SEC;   INR: 1.07          PTT - ( 28 Jul 2017 18:46 )  PTT:33.6 SEC      Venous Blood Gas:  07-28 @ 22:25  7.27/58/28/22/34.3  VBG Lactate: 2.5  Venous Blood Gas:  07-28 @ 18:46  7.29/53/28/21/38.6  VBG Lactate: 2.9      MICROBIOLOGY:     RADIOLOGY:  [ ] Reviewed and interpreted by me    EKG: CHIEF COMPLAINT: worsening SOB    Interval Events:     REVIEW OF SYSTEMS:  Constitutional: [ ] negative [ ] fevers [ ] chills [ ] weight loss [ ] weight gain  HEENT: [ ] negative [ ] dry eyes [ ] eye irritation [ ] postnasal drip [ ] nasal congestion  CV: [ ] negative  [ ] chest pain [ ] orthopnea [ ] palpitations [ ] murmur  Resp: [ ] negative [ ] cough [ ] shortness of breath [ ] dyspnea [ ] wheezing [ ] sputum [ ] hemoptysis  GI: [ ] negative [ ] nausea [ ] vomiting [ ] diarrhea [ ] constipation [ ] abd pain [ ] dysphagia   : [ ] negative [ ] dysuria [ ] nocturia [ ] hematuria [ ] increased urinary frequency  Musculoskeletal: [ ] negative [ ] back pain [ ] myalgias [ ] arthralgias [ ] fracture  Skin: [ ] negative [ ] rash [ ] itch  Neurological: [ ] negative [ ] headache [ ] dizziness [ ] syncope [ ] weakness [ ] numbness  Psychiatric: [ ] negative [ ] anxiety [ ] depression  Endocrine: [ ] negative [ ] diabetes [ ] thyroid problem  Hematologic/Lymphatic: [ ] negative [ ] anemia [ ] bleeding problem  Allergic/Immunologic: [ ] negative [ ] itchy eyes [ ] nasal discharge [ ] hives [ ] angioedema  [ ] All other systems negative  [ x] Unable to assess ROS because _intubated and sedated._______    OBJECTIVE:  ICU Vital Signs Last 24 Hrs  T(C): 36.1 (29 Jul 2017 01:00), Max: 36.4 (28 Jul 2017 19:55)  T(F): 97 (29 Jul 2017 01:00), Max: 97.6 (28 Jul 2017 19:55)  HR: 132 (29 Jul 2017 01:05) (69 - 138)  BP: 148/76 (29 Jul 2017 01:00) (49/40 - 174/88)  BP(mean): 95 (29 Jul 2017 01:00) (95 - 95)  ABP: --  ABP(mean): --  RR: 14 (29 Jul 2017 01:00) (14 - 35)  SpO2: 100% (29 Jul 2017 01:05) (94% - 100%)    Mode: AC/ CMV (Assist Control/ Continuous Mandatory Ventilation), RR (machine): 12, TV (machine): 380, FiO2: 30, PEEP: 5, ITime: 0.9, MAP: 8.7, PIP: 19    CAPILLARY BLOOD GLUCOSE          Physical Exam: General: well-developed, lethargic  HEENT: sclera clear  Neck: supple  Respiratory: clear to auscultation anteriorly, ETT in place  Cardiovascular: +S1, S2, RRR  Abdomen: soft, NTTP, +BS  Extremities: bilateral LE edema  Skin: no rashes  Neurological: lethargic, arousable    LINES:    HOSPITAL MEDICATIONS:    vancomycin  IVPB 1000 milliGRAM(s) IV Intermittent once  piperacillin/tazobactam IVPB. 3.375 Gram(s) IV Intermittent once  piperacillin/tazobactam IVPB. 3.375 Gram(s) IV Intermittent every 12 hours  levothyroxine Injectable 25 MICROGram(s) IV Push daily  fentaNYL   Infusion 0.5 MICROgram(s)/kG/Hr IV Continuous <Continuous>    LABS:                        14.2   7.91  )-----------( 206      ( 28 Jul 2017 18:46 )             48.6     Hgb Trend: 14.2<--  07-28    141  |  103  |  37<H>  ----------------------------<  137<H>  4.0   |  21<L>  |  1.57<H>    Ca    8.9      28 Jul 2017 18:46    TPro  8.0  /  Alb  3.7  /  TBili  0.8  /  DBili  x   /  AST  28  /  ALT  15  /  AlkPhos  155<H>  07-28    Creatinine Trend: 1.57<--  PT/INR - ( 28 Jul 2017 18:46 )   PT: 12.0 SEC;   INR: 1.07          PTT - ( 28 Jul 2017 18:46 )  PTT:33.6 SEC      Venous Blood Gas:  07-28 @ 22:25  7.27/58/28/22/34.3  VBG Lactate: 2.5  Venous Blood Gas:  07-28 @ 18:46  7.29/53/28/21/38.6  VBG Lactate: 2.9      MICROBIOLOGY:     RADIOLOGY:  [ ] Reviewed and interpreted by me    EKG: CHIEF COMPLAINT: worsening SOB    Interval Events:     REVIEW OF SYSTEMS:  Constitutional: [ ] negative [ ] fevers [ ] chills [ ] weight loss [ ] weight gain  HEENT: [ ] negative [ ] dry eyes [ ] eye irritation [ ] postnasal drip [ ] nasal congestion  CV: [ ] negative  [ ] chest pain [ ] orthopnea [ ] palpitations [ ] murmur  Resp: [ ] negative [ ] cough [ ] shortness of breath [ ] dyspnea [ ] wheezing [ ] sputum [ ] hemoptysis  GI: [ ] negative [ ] nausea [ ] vomiting [ ] diarrhea [ ] constipation [ ] abd pain [ ] dysphagia   : [ ] negative [ ] dysuria [ ] nocturia [ ] hematuria [ ] increased urinary frequency  Musculoskeletal: [ ] negative [ ] back pain [ ] myalgias [ ] arthralgias [ ] fracture  Skin: [ ] negative [ ] rash [ ] itch  Neurological: [ ] negative [ ] headache [ ] dizziness [ ] syncope [ ] weakness [ ] numbness  Psychiatric: [ ] negative [ ] anxiety [ ] depression  Endocrine: [ ] negative [ ] diabetes [ ] thyroid problem  Hematologic/Lymphatic: [ ] negative [ ] anemia [ ] bleeding problem  Allergic/Immunologic: [ ] negative [ ] itchy eyes [ ] nasal discharge [ ] hives [ ] angioedema  [ ] All other systems negative  [ x] Unable to assess ROS because _intubated and sedated._______    OBJECTIVE:  ICU Vital Signs Last 24 Hrs  T(C): 36.1 (29 Jul 2017 01:00), Max: 36.4 (28 Jul 2017 19:55)  T(F): 97 (29 Jul 2017 01:00), Max: 97.6 (28 Jul 2017 19:55)  HR: 132 (29 Jul 2017 01:05) (69 - 138)  BP: 148/76 (29 Jul 2017 01:00) (49/40 - 174/88)  BP(mean): 95 (29 Jul 2017 01:00) (95 - 95)  ABP: --  ABP(mean): --  RR: 14 (29 Jul 2017 01:00) (14 - 35)  SpO2: 100% (29 Jul 2017 01:05) (94% - 100%)    Mode: AC/ CMV (Assist Control/ Continuous Mandatory Ventilation), RR (machine): 12, TV (machine): 380, FiO2: 30, PEEP: 5, ITime: 0.9, MAP: 8.7, PIP: 19    CAPILLARY BLOOD GLUCOSE          Physical Exam: General: well-developed, lethargic  HEENT: sclera clear  Neck: supple  Respiratory: clear to auscultation anteriorly, ETT in place  Cardiovascular: +S1, S2, RRR  Abdomen: soft, NTTP, +BS  Extremities: bilateral LE edema  Skin: no rashes  Neurological: lethargic, arousable    LINES:    HOSPITAL MEDICATIONS:    vancomycin  IVPB 1000 milliGRAM(s) IV Intermittent once  piperacillin/tazobactam IVPB. 3.375 Gram(s) IV Intermittent once  piperacillin/tazobactam IVPB. 3.375 Gram(s) IV Intermittent every 12 hours  levothyroxine Injectable 25 MICROGram(s) IV Push daily    LABS:                        14.2   7.91  )-----------( 206      ( 28 Jul 2017 18:46 )             48.6     Hgb Trend: 14.2<--  07-28    141  |  103  |  37<H>  ----------------------------<  137<H>  4.0   |  21<L>  |  1.57<H>    Ca    8.9      28 Jul 2017 18:46    TPro  8.0  /  Alb  3.7  /  TBili  0.8  /  DBili  x   /  AST  28  /  ALT  15  /  AlkPhos  155<H>  07-28    Creatinine Trend: 1.57<--  PT/INR - ( 28 Jul 2017 18:46 )   PT: 12.0 SEC;   INR: 1.07          PTT - ( 28 Jul 2017 18:46 )  PTT:33.6 SEC      Venous Blood Gas:  07-28 @ 22:25  7.27/58/28/22/34.3  VBG Lactate: 2.5  Venous Blood Gas:  07-28 @ 18:46  7.29/53/28/21/38.6  VBG Lactate: 2.9      MICROBIOLOGY:     RADIOLOGY:  [ ] Reviewed and interpreted by me    EKG:

## 2017-07-29 NOTE — H&P ADULT - NSHPSOCIALHISTORY_GEN_ALL_CORE
Fomer smoker, no EtOH, no recreational drugs. Patient is wheelchair bound at baseline and AAOx1 at baseline.

## 2017-07-29 NOTE — H&P ADULT - PROBLEM SELECTOR PLAN 2
- patient with history of dysphagia per chart review should be on dysphagia diet with honey-thickened liquids  - likely chronically aspirating, possible aspiration as cause of acute shortness of breath prior to admission  - will treat with Zosyn

## 2017-07-29 NOTE — PROGRESS NOTE ADULT - ASSESSMENT
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), COPD, CKD III, recent admission in June for UGI bleed who presented with worsening SOB, intubated for hypercapnic respiratory failure in the setting of likely chronic aspiration    #Neuro  -No issues    #Pulm  Acute respiratory failure with hypercapnia  - patient with worsening hypercapnia despite BiPAP in the setting of COPD and likely chronic aspiration, lethargic, s/p intubation in ED  - c/w mechanical ventilation, daily CPAP trials  - repeat ABG    #CV  -Hypotension likely 2/2 sepsis  -still on 0.01 mcg/kg/min of Levophed  -c/w MAP goal of > 65      #ID  -Febrile to 103.2 last night at 8 PM  -likely in septic shock, presumed to be products of conception causing endometritis  -allergic reaction (generalized pruritis) with Zosyn  -started on aztreonam and flagyl overnight; d/c today  -start Ertapenem 1 g daily   -GYN following  -PRN tylenol for fevers    #GI  -Not currently nauseous  -Having diarrhea, stool studies pending collection    #  -1 week post uncomplicated vaginal delivery  -CT scan significant for possible blood products in post-gravid uterus  -OG-GYN will D&C today    #Heme  -No issues    #DVT PPx - SCDs 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), COPD, CKD III, recent admission in June for UGI bleed who presented with worsening SOB, intubated for hypercapnic respiratory failure in the setting of likely chronic aspiration    #Neuro  -h/o Dementia    #Pulm  Acute respiratory failure with hypercapnia  - patient with worsening hypercapnia despite BiPAP in the setting of COPD and likely chronic aspiration, lethargic, s/p intubation in ED  - c/w mechanical ventilation, daily CPAP trials  - repeat ABG    #CV      #ID      #GI  - patient with history of dysphagia per chart review should be on dysphagia diet with honey-thickened liquids  - likely chronically aspirating, possible aspiration as cause of acute shortness of breath prior to admission  - will treat with Zosyn and 1 dose of Vanc     #Renal  CKD stage 3    #  -no issues    # Endo:   -restart home synthroid    #Heme  -No issues    #DVT PPx - SCDs 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  Plan: - patient with history of Afib not on AC due to history of ICH last year  - AAOx1 at baseline per daughter, however given MS changes (lethargy), CT head obtained in ED, read pending  - continue to hold AC as risks outweigh benefits at this time.       #ID      #GI  - patient with history of dysphagia per chart review should be on dysphagia diet with honey-thickened liquids  - likely chronically aspirating, possible aspiration as cause of acute shortness of breath prior to admission  - will treat with Zosyn and 1 dose of Vanc     #Renal  CKD (chronic kidney disease) stage III    - patient with history of CKD III  - SCr stable, appears at baseline from prior hospitalizations  - avoid nephrotoxins, renally dose meds.     #  -no issues    # Endo:   -restart home synthroid    #Heme  -No issues    #DVT PPx - SCDs 87 yo F w/PMH of dementia, b/l hip replacements, Afib (not on AC secondary to previous ICH), symptomatic bradycardia (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), COPD, CKD III, recent admission in June for UGI bleed who presented with worsening SOB, intubated for hypercapnic respiratory failure in the setting of likely chronic aspiration    #Neuro  -h/o Dementia. A/O x1 at baseline. currently on fentanyl. intubated yesterday evening.    #Pulm  Acute respiratory failure with hypercapnia  POCUS revealed bilateral LL consolidations, bilateral lateral and anterior B-lines. Trace R plef. on Echo septal hypokinesis, AR, MR, TR. LVH/RVH and pulmonary HTN.  - patient with worsening hypercapnia despite BiPAP in the setting of COPD and likely chronic aspiration, lethargic, s/p intubation in ED  - c/w mechanical ventilation, daily CPAP trials  - repeat ABG    #CV  Atrial fibrillation.   - patient with history of Afib not on AC due to history of ICH last year  - AAOx1 at baseline per daughter, however given MS changes (lethargy), CT head obtained in ED, read pending  - continue to hold AC as risks outweigh benefits at this time.       #ID  possible aspiration. start Zosyn and 1 dose of Vanc     #GI  - patient with history of dysphagia per chart review should be on dysphagia diet with honey-thickened liquids  - likely chronically aspirating, possible aspiration as cause of acute shortness of breath prior to admission  - will treat with Zosyn and 1 dose of Vanc     #Renal  CKD (chronic kidney disease) stage III    - patient with history of CKD III  - SCr stable, appears at baseline from prior hospitalizations  - avoid nephrotoxins, renally dose meds.     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 A/O x1 at baseline. currently on fentanyl. intubated yesterday evening.    #Pulm  Acute respiratory failure with hypercapnia  POCUS revealed bilateral LL consolidations, bilateral lateral and anterior B-lines. Trace R plef. on Echo septal hypokinesis, AR, MR, TR. LVH/RVH and pulmonary HTN.  - patient with worsening hypercapnia despite BiPAP in the setting of COPD and likely chronic aspiration, lethargic, s/p intubation in ED  - c/w mechanical ventilation, daily CPAP trials  - repeat ABG    #CV  Atrial fibrillation.   - patient with history of Afib not on AC due to history of ICH last year  - AAOx1 at baseline per daughter, however given MS changes (lethargy), CT head obtained in ED, read pending  - continue to hold AC as risks outweigh benefits at this time.       #ID  possible aspiration. start Zosyn and 1 dose of Vanc     #GI  - patient with history of dysphagia per chart review should be on dysphagia diet with honey-thickened liquids  - likely chronically aspirating, possible aspiration as cause of acute shortness of breath prior to admission  - will treat with Zosyn and 1 dose of Vanc     #Renal  CKD (chronic kidney disease) stage III    - patient with history of CKD III  - SCr stable, appears at baseline from prior hospitalizations  -received lasix 40mg in ED, will monitor urine ouput  - avoid nephrotoxins, renally dose meds.     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 A/O x1 at baseline. intubated yesterday evening.    #Pulm  Acute respiratory failure with hypercapnia  POCUS revealed bilateral LL consolidations, bilateral lateral and anterior B-lines. Trace R plef. on Echo septal hypokinesis, AR, MR, TR. LVH/RVH and pulmonary HTN.  - patient with worsening hypercapnia despite BiPAP in the setting of COPD and likely chronic aspiration, lethargic, s/p intubation in ED  - c/w mechanical ventilation, daily CPAP trials  - repeat ABG    #CV  Atrial fibrillation.   - patient with history of Afib not on AC due to history of ICH last year  - AAOx1 at baseline per daughter, however given MS changes (lethargy), CT head obtained in ED, read pending  - continue to hold AC as risks outweigh benefits at this time.       #ID  possible aspiration. start Zosyn and 1 dose of Vanc     #GI  - patient with history of dysphagia per chart review should be on dysphagia diet with honey-thickened liquids  - likely chronically aspirating, possible aspiration as cause of acute shortness of breath prior to admission  - will treat with Zosyn and 1 dose of Vanc     #Renal  CKD (chronic kidney disease) stage III    - patient with history of CKD III  - SCr stable, appears at baseline from prior hospitalizations  -received lasix 40mg in ED, will monitor urine ouput  - avoid nephrotoxins, renally dose meds.     #  -no issues    # Endo:   -restart home synthroid    #Heme  -No issues    #DVT PPx - SCDs  Hep subQ 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), COPD, CKD III, recent admission in June for UGI bleed who presented with worsening SOB, intubated for hypercapnic respiratory failure in the setting of likely chronic aspiration    #Neuro  -intubated yesterday evening.  h/o Dementia. AAOx1 at baseline per daughter, however given MS changes (lethargy), CT head obtained in ED: chronic right parietal     #Pulm  Acute respiratory failure with hypercapnia  POCUS revealed bilateral LL consolidations, bilateral lateral and anterior B-lines. Trace R plef. on Echo septal hypokinesis, AR, MR, TR. LVH/RVH and pulmonary HTN.  - patient with worsening hypercapnia despite BiPAP in the setting of COPD and likely chronic aspiration, lethargic, s/p intubation in ED  - c/w mechanical ventilation, daily CPAP trials  - repeat ABG    #CV  Atrial fibrillation.   - patient with history of Afib not on AC due to history of ICH last year  - continue to hold AC as risks outweigh benefits at this time.       #ID  possible aspiration. start Zosyn and 1 dose of Vanc     #GI  - patient with history of dysphagia per chart review should be on dysphagia diet with honey-thickened liquids  - likely chronically aspirating, possible aspiration as cause of acute shortness of breath prior to admission  - will treat with Zosyn and 1 dose of Vanc     #Renal  CKD (chronic kidney disease) stage III    - patient with history of CKD III  - SCr stable, appears at baseline from prior hospitalizations  -received lasix 40mg in ED, will monitor urine ouput  - avoid nephrotoxins, renally dose meds.     #  -no issues    # Endo:   -restart home synthroid    #Heme  -No issues    #DVT PPx - SCDs  Hep subQ 87 yo F w/PMH of dementia, b/l hip replacements, Afib (not on AC secondary to previous ICH), symptomatic bradycardia (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), COPD, CKD III, recent admission in June for UGI bleed who presented with worsening SOB, intubated for hypercapnic respiratory failure in the setting of likely chronic aspiration    #Neuro  -intubated yesterday evening.  h/o Dementia. AAOx1 at baseline per daughter, however given MS changes (lethargy), CT head obtained in ED: chronic right parietal infarcts, unchanged. No acute intracranial hemorrhage, mass effect, or CT evidence of a large acute of transcortical infarct.    #Pulm  Acute respiratory failure with hypercapnia  POCUS revealed bilateral LL consolidations, bilateral lateral and anterior B-lines. Trace R plef. on Echo septal hypokinesis, AR, MR, TR. LVH/RVH and pulmonary HTN.  - patient with worsening hypercapnia despite BiPAP in the setting of COPD and likely chronic aspiration, lethargic, s/p intubation in ED  - c/w mechanical ventilation, daily CPAP trials  - repeat ABG    #CV  Atrial fibrillation.   - patient with history of Afib not on AC due to history of ICH last year  - continue to hold AC as risks outweigh benefits at this time.   -EKG this morning showed SVTs      #ID  possible aspiration. U/A positive. UTI.  start Zosyn and 1 dose of Vanc     #GI  - patient with history of dysphagia per chart review should be on dysphagia diet with honey-thickened liquids  - likely chronically aspirating, possible aspiration as cause of acute shortness of breath prior to admission  - will treat with Zosyn and 1 dose of Vanc     #Renal  CKD (chronic kidney disease) stage III    - patient with history of CKD III  - SCr stable, appears at baseline from prior hospitalizations  -received lasix 40mg in ED, will monitor urine ouput  - avoid nephrotoxins, renally dose meds.     #  -U/A positive. UTI.     # Endo:   -restart home synthroid    #Heme  -No issues    #DVT PPx - SCDs  Hep subQ 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), COPD, CKD III, recent admission in June for UGI bleed who presented with worsening SOB, intubated for hypercapnic respiratory failure in the setting of likely chronic aspiration    #Neuro  -intubated yesterday evening.  h/o Dementia. AAOx1 at baseline per daughter, however given MS changes (lethargy), CT head obtained in ED: chronic right parietal infarcts, unchanged. No acute intracranial hemorrhage, mass effect, or CT evidence of a large acute of transcortical infarct.    #Pulm  Acute respiratory failure with hypercapnia  POCUS revealed bilateral LL consolidations, bilateral lateral and anterior B-lines. Trace R plef. on Echo septal hypokinesis, AR, MR, TR. LVH/RVH and pulmonary HTN.  - patient with worsening hypercapnia despite BiPAP in the setting of COPD and likely chronic aspiration, lethargic, s/p intubation in ED  - c/w mechanical ventilation, daily CPAP trials  - repeat ABG    #CV  Atrial fibrillation.   - patient with history of Afib not on AC due to history of ICH last year  - continue to hold AC as risks outweigh benefits at this time.   -EKG this morning showed SVTs      #ID  possible aspiration. U/A positive. UTI.  start Zosyn and 1 dose of Vanc     #GI  - patient with history of dysphagia per chart review should be on dysphagia diet with honey-thickened liquids  - likely chronically aspirating, possible aspiration as cause of acute shortness of breath prior to admission  - will treat with Zosyn and 1 dose of Vanc     #Renal  CKD (chronic kidney disease) stage III    - patient with history of CKD III  - SCr stable, appears at baseline from prior hospitalizations  -received lasix 40mg in ED, will monitor urine ouput  - avoid nephrotoxins, renally dose meds.     #  -U/A positive. UTI. Start Vanc    # Endo:   -restart home synthroid    #Heme  -No issues    #DVT PPx - SCDs  Hep subQ

## 2017-07-29 NOTE — H&P ADULT - PROBLEM SELECTOR PLAN 4
- patient with history of CKD III  - SCr stable, appears at baseline from prior hospitalizations  - avoid nephrotoxins, renally dose meds

## 2017-07-29 NOTE — H&P ADULT - PROBLEM SELECTOR PLAN 3
- patient with history of Afib not on AC due to history of ICH last year  - AAOx1 at baseline per daughter, however given MS changes (lethargy), CT head obtained in ED which showed ___  - continue to hold AC as risks outweigh benefits at this time - patient with history of Afib not on AC due to history of ICH last year  - AAOx1 at baseline per daughter, however given MS changes (lethargy), CT head obtained in ED, read pending  - continue to hold AC as risks outweigh benefits at this time

## 2017-07-29 NOTE — H&P ADULT - HISTORY OF PRESENT ILLNESS
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 Per the daughter, the patient has been having progressively worsening SOB today, so EMS called. Patient with SOB with exertion at baseline per daughter. She is a former smoker, having quit "many years ago." Unclear history of COPD, patient not on O2 or BiPAP at home.     In the ED, VBG revealed a pH of 7.29, pCO2 of 53. Pro-Bnp 91778. Lactate 2.9. CXR showing reticular opacities in bilateral lung bases, unchanged from prior, may represent pulmonary fibrosis. Given DuoNebs x 2, Xopenex x 1, Solumedrol 125 x 1 and 2g MgSO4. B-lines noted on POCUS per ED, given 40mg IVP of Lasix. Patient started on BiPAP on arrival to ED, repeat VBG showed worsening acidosis with pH 7/27 and pCO2 of 58. Patient with worsening lethargy, intubated for hypercapnic respiratory failure. 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 Per the daughter, the patient has been having progressively worsening SOB today, so EMS called. Patient with SOB with exertion at baseline per daughter. She is a former smoker, having quit "many years ago." Unclear history of COPD, patient not on O2 or BiPAP at home.     In the ED, VBG revealed a pH of 7.29, pCO2 of 53. Pro-Bnp 37020. Lactate 2.9. CXR showing reticular opacities in bilateral lung bases, unchanged from prior, may represent pulmonary fibrosis. Given DuoNebs x 2, Xopenex x 1, Solumedrol 125 x 1 and 2g MgSO4. B-lines noted on POCUS per ED, given 40mg IVP of Lasix. Patient started on BiPAP on arrival to ED, repeat VBG showed worsening acidosis with pH 7.27 and pCO2 of 58. Patient with worsening lethargy, intubated for hypercapnic respiratory failure.

## 2017-07-29 NOTE — H&P ADULT - NSHPPHYSICALEXAM_GEN_ALL_CORE
General: well-developed, lethargic  HEENT: sclera clear  Neck: supple  Respiratory: clear to auscultation anteriorly, ETT in place  Cardiovascular: +S1, S2, RRR  Abdomen: soft, NTTP, +BS  Extremities: bilateral LE edema  Skin: no rashes  Neurological: lethargic, arousable

## 2017-07-29 NOTE — CONSULT NOTE ADULT - SUBJECTIVE AND OBJECTIVE BOX
CHIEF COMPLAINT: SOB    HISTORY OF PRESENT ILLNESS:   This is a 88yoF w/ PMhx dementia, b/l hip replacements, afib (no AC h/o ICH), PPM for symptomatic bradycardia, COPD, CKD p/w worsening SOB failing to respond to BIPAP requiring intubation in ED.  Cardiology consulted for IFTIKHAR in the setting tachycardia.     EKG: Sinus tachycardia ; no IFTIKHAR    Allergies  latex (Flushing (Skin))  No Known Drug Allergies    MEDICATIONS:  fentaNYL   Infusion 0.5 MICROgram(s)/kG/Hr IV Continuous <Continuous>    PAST MEDICAL & SURGICAL HISTORY:  Symptomatic bradycardia  Obese  Pruritus  Anxiety  Atrial fibrillation and flutter  HLD (hyperlipidemia)  CKD (chronic kidney disease)  COPD (chronic obstructive pulmonary disease)  Arthritis  Benign essential HTN  Senile dementia with depression  Status post bilateral total hip replacement: several years ago  Status post placement of implantable loop recorder: 9/15  History of back surgery: x3, approimately 1980, 1985, 1991      FAMILY HISTORY:  No pertinent family history in first degree relatives      REVIEW OF SYSTEMS:  See HPI. Otherwise, 10 point ROS done and otherwise negative.    PHYSICAL EXAM:  T(C): 36.4 (07-28-17 @ 19:55), Max: 36.4 (07-28-17 @ 19:55)  HR: 137 (07-29-17 @ 00:31) (69 - 138)  BP: 139/89 (07-29-17 @ 00:31) (49/40 - 174/88)  RR: 16 (07-29-17 @ 00:31) (16 - 35)  SpO2: 100% (07-29-17 @ 00:31) (94% - 100%)  Wt(kg): --  I&O's Summary      Appearance: Normal	  HEENT:   Normal oral mucosa, PERRL, EOMI	  Lymphatic: No lymphadenopathy  Cardiovascular: Normal S1 S2, No JVD, No murmurs, No edema  Respiratory: Lungs clear to auscultation	  Psychiatry: A & O x 3, Mood & affect appropriate  Gastrointestinal:  Soft, Non-tender, + BS	  Skin: No rashes, No ecchymoses, No cyanosis	  Neurologic: Non-focal  Extremities: Normal range of motion, No clubbing, cyanosis or edema  Vascular: Peripheral pulses palpable 2+ bilaterally        LABS:	 	    CBC Full  -  ( 28 Jul 2017 18:46 )  WBC Count : 7.91 K/uL  Hemoglobin : 14.2 g/dL  Hematocrit : 48.6 %  Platelet Count - Automated : 206 K/uL  Mean Cell Volume : 90.5 fL  Mean Cell Hemoglobin : 26.4 pg  Mean Cell Hemoglobin Concentration : 29.2 %  Auto Neutrophil # : 4.45 K/uL  Auto Lymphocyte # : 2.41 K/uL  Auto Monocyte # : 0.75 K/uL  Auto Eosinophil # : 0.17 K/uL  Auto Basophil # : 0.10 K/uL  Auto Neutrophil % : 56.2 %  Auto Lymphocyte % : 30.5 %  Auto Monocyte % : 9.5 %  Auto Eosinophil % : 2.1 %  Auto Basophil % : 1.3 %    07-28    141  |  103  |  37<H>  ----------------------------<  137<H>  4.0   |  21<L>  |  1.57<H>    Ca    8.9      28 Jul 2017 18:46    TPro  8.0  /  Alb  3.7  /  TBili  0.8  /  DBili  x   /  AST  28  /  ALT  15  /  AlkPhos  155<H>  07-28      proBNP: Serum Pro-Brain Natriuretic Peptide: 70413 pg/mL (07-28 @ 18:46)    CARDIAC MARKERS ( 28 Jul 2017 18:46 )  x     / < 0.06 ng/mL / 45 u/L / 3.61 ng/mL / x

## 2017-07-29 NOTE — H&P ADULT - NSHPLABSRESULTS_GEN_ALL_CORE
VBG revealed a pH of 7.29, pCO2 of 53. Pro-Bnp 64155. Lactate 2.9. CXR showing reticular opacities in bilateral lung bases, unchanged from prior, may represent pulmonary fibrosis. Repeat VBG showed worsening acidosis with pH 7/27 and pCO2 of 58. VBG revealed a pH of 7.29, pCO2 of 53. Pro-Bnp 91793. Lactate 2.9. CXR showing reticular opacities in bilateral lung bases, unchanged from prior, may represent pulmonary fibrosis. Repeat VBG showed worsening acidosis with pH 7.27 and pCO2 of 58.

## 2017-07-29 NOTE — H&P ADULT - ASSESSMENT
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), COPD, CKD III, recent admission in June for UGI bleed who presented with worsening SOB, intubated for hypercapnic respiratory failure in the setting of likely chronic aspiration

## 2017-07-29 NOTE — PROGRESS NOTE ADULT - PROBLEM SELECTOR PLAN 3
- patient with history of Afib not on AC due to history of ICH last year  - AAOx1 at baseline per daughter, however given MS changes (lethargy), CT head obtained in ED, read pending  - continue to hold AC as risks outweigh benefits at this time

## 2017-07-30 LAB
ALBUMIN SERPL ELPH-MCNC: 2.5 G/DL — LOW (ref 3.3–5)
ALP SERPL-CCNC: 100 U/L — SIGNIFICANT CHANGE UP (ref 40–120)
ALT FLD-CCNC: 13 U/L — SIGNIFICANT CHANGE UP (ref 4–33)
AST SERPL-CCNC: 18 U/L — SIGNIFICANT CHANGE UP (ref 4–32)
BASE EXCESS BLDA CALC-SCNC: -2.9 MMOL/L — SIGNIFICANT CHANGE UP
BILIRUB SERPL-MCNC: 0.6 MG/DL — SIGNIFICANT CHANGE UP (ref 0.2–1.2)
BUN SERPL-MCNC: 38 MG/DL — HIGH (ref 7–23)
CALCIUM SERPL-MCNC: 8.3 MG/DL — LOW (ref 8.4–10.5)
CHLORIDE BLDA-SCNC: 110 MMOL/L — HIGH (ref 96–108)
CHLORIDE SERPL-SCNC: 106 MMOL/L — SIGNIFICANT CHANGE UP (ref 98–107)
CHLORIDE UR-SCNC: 11 MMOL/L — SIGNIFICANT CHANGE UP
CO2 SERPL-SCNC: 20 MMOL/L — LOW (ref 22–31)
CREAT ?TM UR-MCNC: 38.07 MG/DL — SIGNIFICANT CHANGE UP
CREAT BLDA-MCNC: 1.66 MG/DL — HIGH (ref 0.5–1.3)
CREAT SERPL-MCNC: 1.54 MG/DL — HIGH (ref 0.5–1.3)
GLUCOSE BLDA-MCNC: 97 MG/DL — SIGNIFICANT CHANGE UP (ref 70–99)
GLUCOSE SERPL-MCNC: 99 MG/DL — SIGNIFICANT CHANGE UP (ref 70–99)
HCO3 BLDA-SCNC: 23 MMOL/L — SIGNIFICANT CHANGE UP (ref 22–26)
HCT VFR BLD CALC: 39.6 % — SIGNIFICANT CHANGE UP (ref 34.5–45)
HCT VFR BLDA CALC: 43.8 % — SIGNIFICANT CHANGE UP (ref 34.5–46.5)
HGB BLD-MCNC: 12.5 G/DL — SIGNIFICANT CHANGE UP (ref 11.5–15.5)
HGB BLDA-MCNC: 14.3 G/DL — SIGNIFICANT CHANGE UP (ref 11.5–15.5)
LACTATE BLDA-SCNC: 1.5 MMOL/L — SIGNIFICANT CHANGE UP (ref 0.5–2)
MAGNESIUM SERPL-MCNC: 2.3 MG/DL — SIGNIFICANT CHANGE UP (ref 1.6–2.6)
MCHC RBC-ENTMCNC: 27.5 PG — SIGNIFICANT CHANGE UP (ref 27–34)
MCHC RBC-ENTMCNC: 31.6 % — LOW (ref 32–36)
MCV RBC AUTO: 87.2 FL — SIGNIFICANT CHANGE UP (ref 80–100)
NRBC # FLD: 0 — SIGNIFICANT CHANGE UP
PCO2 BLDA: 30 MMHG — LOW (ref 32–48)
PH BLDA: 7.45 PH — SIGNIFICANT CHANGE UP (ref 7.35–7.45)
PHOSPHATE SERPL-MCNC: 3.8 MG/DL — SIGNIFICANT CHANGE UP (ref 2.5–4.5)
PLATELET # BLD AUTO: 172 K/UL — SIGNIFICANT CHANGE UP (ref 150–400)
PMV BLD: 10.7 FL — SIGNIFICANT CHANGE UP (ref 7–13)
PO2 BLDA: 168 MMHG — HIGH (ref 83–108)
POTASSIUM BLDA-SCNC: 3.5 MMOL/L — SIGNIFICANT CHANGE UP (ref 3.4–4.5)
POTASSIUM SERPL-MCNC: 3.9 MMOL/L — SIGNIFICANT CHANGE UP (ref 3.5–5.3)
POTASSIUM SERPL-SCNC: 3.9 MMOL/L — SIGNIFICANT CHANGE UP (ref 3.5–5.3)
POTASSIUM UR-SCNC: 13.7 MEQ/L — SIGNIFICANT CHANGE UP
PROT SERPL-MCNC: 5.5 G/DL — LOW (ref 6–8.3)
RBC # BLD: 4.54 M/UL — SIGNIFICANT CHANGE UP (ref 3.8–5.2)
RBC # FLD: 17.6 % — HIGH (ref 10.3–14.5)
SAO2 % BLDA: 99.8 % — HIGH (ref 95–99)
SODIUM BLDA-SCNC: 134 MMOL/L — LOW (ref 136–146)
SODIUM SERPL-SCNC: 142 MMOL/L — SIGNIFICANT CHANGE UP (ref 135–145)
SODIUM UR-SCNC: 28 MEQ/L — SIGNIFICANT CHANGE UP
SPECIMEN SOURCE: SIGNIFICANT CHANGE UP
SPECIMEN SOURCE: SIGNIFICANT CHANGE UP
VANCOMYCIN TROUGH SERPL-MCNC: 12.4 UG/ML — SIGNIFICANT CHANGE UP (ref 10–20)
WBC # BLD: 10.09 K/UL — SIGNIFICANT CHANGE UP (ref 3.8–10.5)
WBC # FLD AUTO: 10.09 K/UL — SIGNIFICANT CHANGE UP (ref 3.8–10.5)

## 2017-07-30 PROCEDURE — 99291 CRITICAL CARE FIRST HOUR: CPT

## 2017-07-30 RX ORDER — IPRATROPIUM/ALBUTEROL SULFATE 18-103MCG
3 AEROSOL WITH ADAPTER (GRAM) INHALATION EVERY 6 HOURS
Qty: 0 | Refills: 0 | Status: DISCONTINUED | OUTPATIENT
Start: 2017-07-30 | End: 2017-08-02

## 2017-07-30 RX ORDER — IPRATROPIUM/ALBUTEROL SULFATE 18-103MCG
3 AEROSOL WITH ADAPTER (GRAM) INHALATION EVERY 6 HOURS
Qty: 0 | Refills: 0 | Status: DISCONTINUED | OUTPATIENT
Start: 2017-07-30 | End: 2017-07-30

## 2017-07-30 RX ORDER — VANCOMYCIN HCL 1 G
1250 VIAL (EA) INTRAVENOUS ONCE
Qty: 0 | Refills: 0 | Status: COMPLETED | OUTPATIENT
Start: 2017-07-30 | End: 2017-07-30

## 2017-07-30 RX ORDER — LEVOTHYROXINE SODIUM 125 MCG
25 TABLET ORAL DAILY
Qty: 0 | Refills: 0 | Status: DISCONTINUED | OUTPATIENT
Start: 2017-07-30 | End: 2017-08-03

## 2017-07-30 RX ADMIN — Medication 3 MILLILITER(S): at 16:15

## 2017-07-30 RX ADMIN — PIPERACILLIN AND TAZOBACTAM 25 GRAM(S): 4; .5 INJECTION, POWDER, LYOPHILIZED, FOR SOLUTION INTRAVENOUS at 14:31

## 2017-07-30 RX ADMIN — Medication 50 MICROGRAM(S): at 05:19

## 2017-07-30 RX ADMIN — HEPARIN SODIUM 5000 UNIT(S): 5000 INJECTION INTRAVENOUS; SUBCUTANEOUS at 21:10

## 2017-07-30 RX ADMIN — HEPARIN SODIUM 5000 UNIT(S): 5000 INJECTION INTRAVENOUS; SUBCUTANEOUS at 14:31

## 2017-07-30 RX ADMIN — Medication 3 MILLILITER(S): at 22:19

## 2017-07-30 RX ADMIN — HEPARIN SODIUM 5000 UNIT(S): 5000 INJECTION INTRAVENOUS; SUBCUTANEOUS at 05:19

## 2017-07-30 RX ADMIN — HEPARIN SODIUM 5000 UNIT(S): 5000 INJECTION INTRAVENOUS; SUBCUTANEOUS at 00:12

## 2017-07-30 RX ADMIN — PIPERACILLIN AND TAZOBACTAM 25 GRAM(S): 4; .5 INJECTION, POWDER, LYOPHILIZED, FOR SOLUTION INTRAVENOUS at 04:12

## 2017-07-30 RX ADMIN — Medication 166.67 MILLIGRAM(S): at 08:49

## 2017-07-30 NOTE — CHART NOTE - NSCHARTNOTEFT_GEN_A_CORE
Transfer note from MICU    87 yo F w/PMH of dementia, b/l hip replacements, Afib (not on AC secondary to previous ICH), symptomatic bradycardia (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), COPD, CKD III, recent admission in June for UGI bleed who presented with worsening SOB per daughter. Per the daughter, the patient has been having progressively worsening SOB today, so EMS called. Patient with SOB with exertion at baseline per daughter. She is a former smoker, having quit "many years ago." Unclear history of COPD, patient not on O2 or BiPAP at home.     In the ED, VBG revealed a pH of 7.29, pCO2 of 53. Pro-Bnp 43172. Lactate 2.9. CXR showing reticular opacities in bilateral lung bases, unchanged from prior, may represent pulmonary fibrosis. Given DuoNebs x 2, Xopenex x 1, Solumedrol 125 x 1 and 2g MgSO4. B-lines noted on POCUS per ED, given 40mg IVP of Lasix. Patient started on BiPAP on arrival to ED, repeat VBG showed worsening acidosis with pH 7.27 and pCO2 of 58. Patient with worsening lethargy, intubated for hypercapnic respiratory failure likely due to COPD, possible aspiration pneumonia.       Patient was intubated and transferred to the MICU. CT head showed No acute intracranial hemorrhage, mass effect, or CT evidence of a large acute of transcortical infarct. POCUS revealed bilateral LL consolidations, bilateral lateral and anterior B-lines. Trace R plef. on Echo septal hypokinesis, AR, MR, TR. LVH/RVH and pulmonary HTN. UA was positive, urine culture positive for >100,000 gram positive rods. Patient was started on vancomycin and zosyn for PNA and UTI. Pt passed CPAP trials on 7/30 and was extubated. Currently patient is on 2 L NC and is medically stable to transfer to floors.       Follow-up:     - daily vancomycin dosing by level    - f/u urine culture speciation and sensitivities    - speech and swallow evaluation; NPO until evaluated Transfer note from MICU    87 yo F w/PMH of dementia, b/l hip replacements, Afib (not on AC secondary to previous ICH), symptomatic bradycardia (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), COPD, CKD III, recent admission in June for UGI bleed who presented with worsening SOB per daughter. Per the daughter, the patient has been having progressively worsening SOB today, so EMS called. Patient with SOB with exertion at baseline per daughter. She is a former smoker, having quit "many years ago." Unclear history of COPD, patient not on O2 or BiPAP at home.     In the ED, VBG revealed a pH of 7.29, pCO2 of 53. Pro-Bnp 08536. Lactate 2.9. CXR showing reticular opacities in bilateral lung bases, unchanged from prior, may represent pulmonary fibrosis. Given DuoNebs x 2, Xopenex x 1, Solumedrol 125 x 1 and 2g MgSO4. B-lines noted on POCUS per ED, given 40mg IVP of Lasix. Patient started on BiPAP on arrival to ED, repeat VBG showed worsening acidosis with pH 7.27 and pCO2 of 58. Patient with worsening lethargy, intubated for hypercapnic respiratory failure likely due to COPD, possible aspiration pneumonia.       Patient was intubated and transferred to the MICU. CT head showed No acute intracranial hemorrhage, mass effect, or CT evidence of a large acute of transcortical infarct. POCUS revealed bilateral LL consolidations, bilateral lateral and anterior B-lines. Trace R plef. on Echo septal hypokinesis, AR, MR, TR. LVH/RVH and pulmonary HTN. UA was positive, urine culture positive for >100,000 gram positive rods. Patient was started on vancomycin and zosyn for PNA and UTI. Pt passed CPAP trials on 7/30 and was extubated. Currently patient is on 2 L NC and is medically stable to transfer to floors.       Follow-up:     - daily vancomycin dosing by level    - f/u urine culture speciation and sensitivities    - speech and swallow evaluation; NPO until evaluated. Per family, pt cannot feed self and eats thickened, pureed foods at home. Transfer note from MICU    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), COPD, CKD III, recent admission in June for UGI bleed who presented with worsening SOB per daughter. Per the daughter, the patient has been having progressively worsening SOB today, so EMS called. Patient with SOB with exertion at baseline per daughter. She is a former smoker, having quit "many years ago." Unclear history of COPD, patient not on O2 or BiPAP at home.     In the ED, VBG revealed a pH of 7.29, pCO2 of 53. Pro-Bnp 96963. Lactate 2.9. CXR showing reticular opacities in bilateral lung bases, unchanged from prior, may represent pulmonary fibrosis. Given DuoNebs x 2, Xopenex x 1, Solumedrol 125 x 1 and 2g MgSO4. B-lines noted on POCUS per ED, given 40mg IVP of Lasix. Patient started on BiPAP on arrival to ED, repeat VBG showed worsening acidosis with pH 7.27 and pCO2 of 58. Patient with worsening lethargy, intubated for hypercapnic respiratory failure likely due to COPD, possible aspiration pneumonia.       Patient was intubated and transferred to the MICU. CT head showed No acute intracranial hemorrhage, mass effect, or CT evidence of a large acute of transcortical infarct. POCUS revealed bilateral LL consolidations, bilateral lateral and anterior B-lines. Trace R plef. on Echo septal hypokinesis, AR, MR, TR. LVH/RVH and pulmonary HTN. UA was positive, urine culture positive for >100,000 gram positive rods. Patient was started on vancomycin and zosyn for PNA and UTI. Pt passed CPAP trials on 7/30 and was extubated. Currently patient is on 2 L NC and is medically stable to transfer to floors.       Follow-up:     - daily vancomycin dosing by level    - f/u urine culture speciation and sensitivities    - speech and swallow evaluation; NPO until evaluated. Per family, pt cannot feed self and eats thickened, pureed foods at home.    - PT evaluation; patient cannot walk at all per daughter, bedbound and wheelchair bound.

## 2017-07-30 NOTE — PROGRESS NOTE ADULT - SUBJECTIVE AND OBJECTIVE BOX
CHIEF COMPLAINT:    Interval Events:    REVIEW OF SYSTEMS:  Constitutional: [ ] negative [ ] fevers [ ] chills [ ] weight loss [ ] weight gain  HEENT: [ ] negative [ ] dry eyes [ ] eye irritation [ ] postnasal drip [ ] nasal congestion  CV: [ ] negative  [ ] chest pain [ ] orthopnea [ ] palpitations [ ] murmur  Resp: [ ] negative [ ] cough [ ] shortness of breath [ ] dyspnea [ ] wheezing [ ] sputum [ ] hemoptysis  GI: [ ] negative [ ] nausea [ ] vomiting [ ] diarrhea [ ] constipation [ ] abd pain [ ] dysphagia   : [ ] negative [ ] dysuria [ ] nocturia [ ] hematuria [ ] increased urinary frequency  Musculoskeletal: [ ] negative [ ] back pain [ ] myalgias [ ] arthralgias [ ] fracture  Skin: [ ] negative [ ] rash [ ] itch  Neurological: [ ] negative [ ] headache [ ] dizziness [ ] syncope [ ] weakness [ ] numbness  Psychiatric: [ ] negative [ ] anxiety [ ] depression  Endocrine: [ ] negative [ ] diabetes [ ] thyroid problem  Hematologic/Lymphatic: [ ] negative [ ] anemia [ ] bleeding problem  Allergic/Immunologic: [ ] negative [ ] itchy eyes [ ] nasal discharge [ ] hives [ ] angioedema  [ ] All other systems negative  [ ] Unable to assess ROS because ________    OBJECTIVE:  ICU Vital Signs Last 24 Hrs  T(C): 36.1 (2017 04:00), Max: 36.2 (2017 16:00)  T(F): 96.9 (2017 04:00), Max: 97.1 (2017 16:00)  HR: 86 (2017 08:00) (58 - 139)  BP: 128/95 (2017 08:00) (89/45 - 143/102)  BP(mean): 102 (2017 08:00) (55 - 111)  ABP: --  ABP(mean): --  RR: 17 (2017 08:00) (17 - 29)  SpO2: 99% (2017 08:00) (96% - 100%)    Mode: CPAP with PS, FiO2: 30, PEEP: 5, PS: 5, ITime: 40, MAP: 7.3, PIP: 11    - @ 07:01  -  07-30 @ 07:00  --------------------------------------------------------  IN: 272 mL / OUT: 1410 mL / NET: -1138 mL      CAPILLARY BLOOD GLUCOSE          PHYSICAL EXAM:  General:   HEENT:   Lymph Nodes:  Neck:   Respiratory:   Cardiovascular:   Abdomen:   Extremities:   Skin:   Neurological:  Psychiatry:    LINES:    HOSPITAL MEDICATIONS:  heparin  Injectable 5000 Unit(s) SubCutaneous every 8 hours    piperacillin/tazobactam IVPB. 3.375 Gram(s) IV Intermittent every 12 hours  vancomycin  IVPB 1250 milliGRAM(s) IV Intermittent once      levothyroxine 50 MICROGram(s) Oral daily      propofol Infusion 20 MICROgram(s)/kG/Min IV Continuous <Continuous>                        LABS:                        12.5   10. )-----------( 172      ( 2017 03:00 )             39.6     Hgb Trend: 12.5<--, 13.2<--, 14.2<--      142  |  106  |  38<H>  ----------------------------<  99  3.9   |  20<L>  |  1.54<H>    Ca    8.3<L>      2017 03:00  Phos  3.8     30  Mg     2.3     30    TPro  5.5<L>  /  Alb  2.5<L>  /  TBili  0.6  /  DBili  x   /  AST  18  /  ALT  13  /  AlkPhos  100  0730    Creatinine Trend: 1.54<--, 1.60<--, 1.57<--  PT/INR - ( 2017 18:46 )   PT: 12.0 SEC;   INR: 1.07          PTT - ( 2017 18:46 )  PTT:33.6 SEC  Urinalysis Basic - ( 2017 02:50 )    Color: YELLOW / Appearance: CLEAR / S.009 / pH: 6.0  Gluc: NEGATIVE / Ketone: NEGATIVE  / Bili: NEGATIVE / Urobili: NORMAL E.U.   Blood: MODERATE / Protein: 100 / Nitrite: NEGATIVE   Leuk Esterase: LARGE / RBC: 25-50 / WBC >50   Sq Epi: OCC / Non Sq Epi: x / Bacteria: FEW      Arterial Blood Gas:   @ 04:30  7.40/31/132/21/99.4/-4.9  ABG lactate: 2.5  Arterial Blood Gas:   @ 02:10  7.28/47/95/20/96.1/-4.6  ABG lactate: 1.9    Venous Blood Gas:   @ 22:25  7.27/58/28/22/34.3  VBG Lactate: 2.5  Venous Blood Gas:   @ 18:46  7.29/53/28/21/38.6  VBG Lactate: 2.9      MICROBIOLOGY:     RADIOLOGY:  [ ] Reviewed and interpreted by me    EKG: CHIEF COMPLAINT: worsening SOB    Interval Events: Patient off sedation this AM. CPAP trials and then extubated this AM to 40% facetent. Vancomycin level 12.4 this morning. Patient is awake and wants to remove ET tube. Follows some commands.     REVIEW OF SYSTEMS:  Constitutional: [ ] negative [ ] fevers [ ] chills [ ] weight loss [ ] weight gain  HEENT: [ ] negative [ ] dry eyes [ ] eye irritation [ ] postnasal drip [ ] nasal congestion  CV: [ ] negative  [ ] chest pain [ ] orthopnea [ ] palpitations [ ] murmur  Resp: [ ] negative [ ] cough [ ] shortness of breath [ ] dyspnea [ ] wheezing [ ] sputum [ ] hemoptysis  GI: [ ] negative [ ] nausea [ ] vomiting [ ] diarrhea [ ] constipation [ ] abd pain [ ] dysphagia   : [ ] negative [ ] dysuria [ ] nocturia [ ] hematuria [ ] increased urinary frequency  Musculoskeletal: [ ] negative [ ] back pain [ ] myalgias [ ] arthralgias [ ] fracture  Skin: [ ] negative [ ] rash [ ] itch  Neurological: [ ] negative [ ] headache [ ] dizziness [ ] syncope [ ] weakness [ ] numbness  Psychiatric: [ ] negative [ ] anxiety [ ] depression  Endocrine: [ ] negative [ ] diabetes [ ] thyroid problem  Hematologic/Lymphatic: [ ] negative [ ] anemia [ ] bleeding problem  Allergic/Immunologic: [ ] negative [ ] itchy eyes [ ] nasal discharge [ ] hives [ ] angioedema  [ ] All other systems negative  [ ] Unable to assess ROS because patient not talking.     OBJECTIVE:  ICU Vital Signs Last 24 Hrs  T(C): 36.1 (2017 04:00), Max: 36.2 (2017 16:00)  T(F): 96.9 (2017 04:00), Max: 97.1 (2017 16:00)  HR: 86 (2017 08:00) (58 - 139)  BP: 128/95 (2017 08:00) (89/45 - 143/102)  BP(mean): 102 (2017 08:00) (55 - 111)  ABP: --  ABP(mean): --  RR: 17 (2017 08:00) (17 - 29)  SpO2: 99% (2017 08:00) (96% - 100%)    Mode: CPAP with PS, FiO2: 30, PEEP: 5, PS: 5, ITime: 40, MAP: 7.3, PIP: 11     @ 07:01  -   @ 07:00  --------------------------------------------------------  IN: 272 mL / OUT: 1410 mL / NET: -1138 mL      CAPILLARY BLOOD GLUCOSE          Physical Exam:   General: elderly woman lying in bed, ET tube in place, mild distress   HEENT: PERRL, ET tube in place, OG tube in place  Respiratory: clear to auscultation anteriorly  Cardiovascular: irregularly irregular, no murmurs  Abdomen: soft, ND, NT, +BS  Extremities: bilateral LE edema  Skin: no visible rashes  Neurological: awake, follows some commands, moving all 4 extremities    LINES:    HOSPITAL MEDICATIONS:  heparin  Injectable 5000 Unit(s) SubCutaneous every 8 hours    piperacillin/tazobactam IVPB. 3.375 Gram(s) IV Intermittent every 12 hours  vancomycin  IVPB 1250 milliGRAM(s) IV Intermittent once    levothyroxine 50 MICROGram(s) Oral daily    propofol Infusion 20 MICROgram(s)/kG/Min IV Continuous <Continuous>        LABS:                        12.5   10.09 )-----------( 172      ( 2017 03:00 )             39.6     Hgb Trend: 12.5<--, 13.2<--, 14.2<--      142  |  106  |  38<H>  ----------------------------<  99  3.9   |  20<L>  |  1.54<H>    Ca    8.3<L>      2017 03:00  Phos  3.8     0730  Mg     2.3     30    TPro  5.5<L>  /  Alb  2.5<L>  /  TBili  0.6  /  DBili  x   /  AST  18  /  ALT  13  /  AlkPhos  100  07    Creatinine Trend: 1.54<--, 1.60<--, 1.57<--  PT/INR - ( 2017 18:46 )   PT: 12.0 SEC;   INR: 1.07          PTT - ( 2017 18:46 )  PTT:33.6 SEC  Urinalysis Basic - ( 2017 02:50 )    Color: YELLOW / Appearance: CLEAR / S.009 / pH: 6.0  Gluc: NEGATIVE / Ketone: NEGATIVE  / Bili: NEGATIVE / Urobili: NORMAL E.U.   Blood: MODERATE / Protein: 100 / Nitrite: NEGATIVE   Leuk Esterase: LARGE / RBC: 25-50 / WBC >50   Sq Epi: OCC / Non Sq Epi: x / Bacteria: FEW      Arterial Blood Gas:   @ 04:30  7.40/31/132/21/99.4/-4.9  ABG lactate: 2.5  Arterial Blood Gas:   @ 02:10  7.28/47/95/20/96.1/-4.6  ABG lactate: 1.9    Venous Blood Gas:   @ 22:25  7.27/58/28/22/34.3  VBG Lactate: 2.5  Venous Blood Gas:   @ 18:46  7.29/53/28/21/38.6  VBG Lactate: 2.9      MICROBIOLOGY:     RADIOLOGY:  [ ] Reviewed and interpreted by me    EKG:

## 2017-07-30 NOTE — PROGRESS NOTE ADULT - ASSESSMENT
87 yo F w/PMH of dementia, b/l hip replacements, Afib (not on AC secondary to previous ICH), symptomatic bradycardia (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), COPD, CKD III, recent admission in June for UGI bleed who presented with worsening SOB, intubated for hypercapnic respiratory failure in the setting of likely chronic aspiration; improved and extubated. Also has UTI with positive UA and urine culture.    #Neuro  - awake, no acute issues  - h/o Dementia. AAOx1 at baseline     #Pulm  Acute respiratory failure with hypercapnia - extubated on facetent  - POCUS showed B-lines, consolidations, LV dysfunction  - f/u post-extubation ABG  - start duoneb    #CV  in Atrial fibrillation, rate controlled in 60s this am  - patient with history of Afib not on AC due to history of ICH last year  - continue to hold AC as risks outweigh benefits at this time.     #ID  possible aspiration PNA. U/A positive, UCx positive for gram negative anna marie >100,000   - c/w Zosyn Q12  - dosed 1250 mg Vancomycin today x1; vancomycin level 12.4 this am    #GI  - NPO until speech and swallow  - f/u speech and swallow results    #Renal  CKD (chronic kidney disease) stage III    - patient with history of CKD III  - Cr 1.54; 1.60 yesterday    #  -UTI w/ positive UA  - urine cx positive for gram negative rods >100,000    # Endo:   - c/w home levothyroxine 50 mcg daily    #Heme  -No issues    #DVT PPx - heparin SQ, SCDs

## 2017-07-31 DIAGNOSIS — J44.9 CHRONIC OBSTRUCTIVE PULMONARY DISEASE, UNSPECIFIED: ICD-10-CM

## 2017-07-31 DIAGNOSIS — N18.3 CHRONIC KIDNEY DISEASE, STAGE 3 (MODERATE): ICD-10-CM

## 2017-07-31 DIAGNOSIS — N39.0 URINARY TRACT INFECTION, SITE NOT SPECIFIED: ICD-10-CM

## 2017-07-31 DIAGNOSIS — R13.10 DYSPHAGIA, UNSPECIFIED: ICD-10-CM

## 2017-07-31 DIAGNOSIS — T68.XXXA HYPOTHERMIA, INITIAL ENCOUNTER: ICD-10-CM

## 2017-07-31 DIAGNOSIS — E44.0 MODERATE PROTEIN-CALORIE MALNUTRITION: ICD-10-CM

## 2017-07-31 DIAGNOSIS — D72.829 ELEVATED WHITE BLOOD CELL COUNT, UNSPECIFIED: ICD-10-CM

## 2017-07-31 DIAGNOSIS — Z29.9 ENCOUNTER FOR PROPHYLACTIC MEASURES, UNSPECIFIED: ICD-10-CM

## 2017-07-31 DIAGNOSIS — J69.0 PNEUMONITIS DUE TO INHALATION OF FOOD AND VOMIT: ICD-10-CM

## 2017-07-31 LAB
BASOPHILS # BLD AUTO: 0.08 K/UL — SIGNIFICANT CHANGE UP (ref 0–0.2)
BASOPHILS NFR BLD AUTO: 0.8 % — SIGNIFICANT CHANGE UP (ref 0–2)
BUN SERPL-MCNC: 40 MG/DL — HIGH (ref 7–23)
CALCIUM SERPL-MCNC: 8.8 MG/DL — SIGNIFICANT CHANGE UP (ref 8.4–10.5)
CHLORIDE SERPL-SCNC: 105 MMOL/L — SIGNIFICANT CHANGE UP (ref 98–107)
CO2 SERPL-SCNC: 20 MMOL/L — LOW (ref 22–31)
CREAT SERPL-MCNC: 1.57 MG/DL — HIGH (ref 0.5–1.3)
EOSINOPHIL # BLD AUTO: 0.11 K/UL — SIGNIFICANT CHANGE UP (ref 0–0.5)
EOSINOPHIL NFR BLD AUTO: 1 % — SIGNIFICANT CHANGE UP (ref 0–6)
GLUCOSE SERPL-MCNC: 84 MG/DL — SIGNIFICANT CHANGE UP (ref 70–99)
HCT VFR BLD CALC: 44.4 % — SIGNIFICANT CHANGE UP (ref 34.5–45)
HGB BLD-MCNC: 13.6 G/DL — SIGNIFICANT CHANGE UP (ref 11.5–15.5)
IMM GRANULOCYTES # BLD AUTO: 0.03 # — SIGNIFICANT CHANGE UP
IMM GRANULOCYTES NFR BLD AUTO: 0.3 % — SIGNIFICANT CHANGE UP (ref 0–1.5)
LYMPHOCYTES # BLD AUTO: 2.95 K/UL — SIGNIFICANT CHANGE UP (ref 1–3.3)
LYMPHOCYTES # BLD AUTO: 28.1 % — SIGNIFICANT CHANGE UP (ref 13–44)
MAGNESIUM SERPL-MCNC: 2.4 MG/DL — SIGNIFICANT CHANGE UP (ref 1.6–2.6)
MCHC RBC-ENTMCNC: 26.9 PG — LOW (ref 27–34)
MCHC RBC-ENTMCNC: 30.6 % — LOW (ref 32–36)
MCV RBC AUTO: 87.9 FL — SIGNIFICANT CHANGE UP (ref 80–100)
MONOCYTES # BLD AUTO: 0.83 K/UL — SIGNIFICANT CHANGE UP (ref 0–0.9)
MONOCYTES NFR BLD AUTO: 7.9 % — SIGNIFICANT CHANGE UP (ref 2–14)
NEUTROPHILS # BLD AUTO: 6.51 K/UL — SIGNIFICANT CHANGE UP (ref 1.8–7.4)
NEUTROPHILS NFR BLD AUTO: 61.9 % — SIGNIFICANT CHANGE UP (ref 43–77)
NRBC # FLD: 0 — SIGNIFICANT CHANGE UP
PHOSPHATE SERPL-MCNC: 4.1 MG/DL — SIGNIFICANT CHANGE UP (ref 2.5–4.5)
PLATELET # BLD AUTO: 209 K/UL — SIGNIFICANT CHANGE UP (ref 150–400)
PMV BLD: 10.6 FL — SIGNIFICANT CHANGE UP (ref 7–13)
POTASSIUM SERPL-MCNC: 3.9 MMOL/L — SIGNIFICANT CHANGE UP (ref 3.5–5.3)
POTASSIUM SERPL-SCNC: 3.9 MMOL/L — SIGNIFICANT CHANGE UP (ref 3.5–5.3)
RBC # BLD: 5.05 M/UL — SIGNIFICANT CHANGE UP (ref 3.8–5.2)
RBC # FLD: 18.3 % — HIGH (ref 10.3–14.5)
SODIUM SERPL-SCNC: 141 MMOL/L — SIGNIFICANT CHANGE UP (ref 135–145)
VANCOMYCIN FLD-MCNC: 25.8 UG/ML — CRITICAL HIGH
WBC # BLD: 10.51 K/UL — HIGH (ref 3.8–10.5)
WBC # FLD AUTO: 10.51 K/UL — HIGH (ref 3.8–10.5)

## 2017-07-31 PROCEDURE — 99233 SBSQ HOSP IP/OBS HIGH 50: CPT | Mod: GC

## 2017-07-31 RX ORDER — SODIUM CHLORIDE 9 MG/ML
1000 INJECTION INTRAMUSCULAR; INTRAVENOUS; SUBCUTANEOUS
Qty: 0 | Refills: 0 | Status: DISCONTINUED | OUTPATIENT
Start: 2017-07-31 | End: 2017-08-02

## 2017-07-31 RX ADMIN — HEPARIN SODIUM 5000 UNIT(S): 5000 INJECTION INTRAVENOUS; SUBCUTANEOUS at 14:34

## 2017-07-31 RX ADMIN — Medication 3 MILLILITER(S): at 16:07

## 2017-07-31 RX ADMIN — Medication 3 MILLILITER(S): at 21:04

## 2017-07-31 RX ADMIN — Medication 3 MILLILITER(S): at 03:00

## 2017-07-31 RX ADMIN — HEPARIN SODIUM 5000 UNIT(S): 5000 INJECTION INTRAVENOUS; SUBCUTANEOUS at 22:36

## 2017-07-31 RX ADMIN — PIPERACILLIN AND TAZOBACTAM 25 GRAM(S): 4; .5 INJECTION, POWDER, LYOPHILIZED, FOR SOLUTION INTRAVENOUS at 01:51

## 2017-07-31 RX ADMIN — Medication 25 MICROGRAM(S): at 07:08

## 2017-07-31 RX ADMIN — Medication 3 MILLILITER(S): at 11:33

## 2017-07-31 RX ADMIN — HEPARIN SODIUM 5000 UNIT(S): 5000 INJECTION INTRAVENOUS; SUBCUTANEOUS at 07:06

## 2017-07-31 RX ADMIN — PIPERACILLIN AND TAZOBACTAM 25 GRAM(S): 4; .5 INJECTION, POWDER, LYOPHILIZED, FOR SOLUTION INTRAVENOUS at 14:33

## 2017-07-31 RX ADMIN — SODIUM CHLORIDE 75 MILLILITER(S): 9 INJECTION INTRAMUSCULAR; INTRAVENOUS; SUBCUTANEOUS at 11:35

## 2017-07-31 NOTE — PROGRESS NOTE ADULT - PROBLEM SELECTOR PROBLEM 2
Aspiration into airway Aspiration pneumonia, unspecified aspiration pneumonia type, unspecified laterality, unspecified part of lung

## 2017-07-31 NOTE — PROGRESS NOTE ADULT - PROBLEM SELECTOR PLAN 2
- patient with history of dysphagia per chart review, should be on dysphagia diet with honey-thickened liquids  - likely chronically aspirating, possible aspiration PNA as cause of acute shortness of breath prior to admission  - f/u S+S eval and resume diet based on recs  - c/w Zosyn  - patient is moderately malnourished, started on NS @ 75 cc/hr - aspiration precaution  - likely chronically aspirating, aspiration PNA as cause of acute shortness of breath prior to admission  - c/w Zosyn, Vanco

## 2017-07-31 NOTE — PROGRESS NOTE ADULT - PROBLEM SELECTOR PLAN 6
- WBC trended up to 10.51 today, likely 2/2 to infection (PNA and UTI)  - Patient non-toxic appearing, improving clinically  - continue to trend CBC - patient was hypothermic to 93 overnight likely 2/2 infection (PNA and UTI)  - T continuing to trend up  - monitor VS

## 2017-07-31 NOTE — SWALLOW BEDSIDE ASSESSMENT ADULT - COMMENTS
Dx: hypercapnic respiratory failure, aspiration PNA  Patient is an 87 yo F w/ PMH of dementia, b/l hip replacements, Afib (not on AC secondary to previous ICH), symptomatic bradycardia (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), COPD, CKD III, recent admission in June for UGI bleed, dysphagia who presented with worsening SOB, intubated for hypercapnic respiratory failure in the setting of likely chronic aspiration.    Of Note: Patient is known to service. Patient had a Cinesophagram back on 6/9/2017 during that admission with recommendations for a Puree and Honey Thick Liquids diet (See Report Consult for details).

## 2017-07-31 NOTE — SWALLOW BEDSIDE ASSESSMENT ADULT - SWALLOW EVAL: DIAGNOSIS
Patient presents with oropharyngeal stage dysphagia characterized by adequate oral containment, adequate bolus manipulation and transport for puree/honey thick liquid trials with adequate oral clearance. There is laryngeal elevation upon palpation, initiation of the pharyngeal swallow with throat clearing observed after the swallow which may be indicative of laryngeal penetration with baseline diet recommendation for puree/honey thick liquid from previous admissions. Patient will benefit repeat Cinesophagram to objectively reassess for possible dysphagia progression.

## 2017-07-31 NOTE — PROGRESS NOTE ADULT - ASSESSMENT
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), COPD, CKD III, recent admission in June for UGI bleed who presented with worsening SOB, intubated for hypercapnic respiratory failure in the setting of likely chronic aspiration 87 yo F w/PMH of dementia, b/l hip replacements, Afib (not on AC secondary to previous ICH), symptomatic bradycardia (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), COPD, CKD III, recent admission in June for UGI bleed who presented with worsening SOB, intubated for hypercapnic respiratory failure in the setting aspiration PNA from dysphagia.

## 2017-07-31 NOTE — PROGRESS NOTE ADULT - PROBLEM SELECTOR PLAN 3
- patient had positive UA, UCx growing gram negative rods  - c/w vanc and zosyn (day 3)  - f/u UCx sensitivities, deescalate abx based on results - maintenance fluids with NS @ 75 cc/hr  - F/U MBS as per S+S eval.  - NPO for now.

## 2017-07-31 NOTE — PROGRESS NOTE ADULT - PROBLEM SELECTOR PLAN 8
Subq heparin - patient with history of CKD III  - SCr stable, appears at baseline from prior hospitalizations  - avoid nephrotoxins, renally dose meds

## 2017-07-31 NOTE — PROGRESS NOTE ADULT - PROBLEM SELECTOR PLAN 7
- patient with history of CKD III  - SCr stable, appears at baseline from prior hospitalizations  - avoid nephrotoxins, renally dose meds - patient with history of Afib not on AC due to history of ICH last year  - will discuss about A/C depending on patients ambulation status - r/o fall risk  - AAOx1 at baseline per daughter, however given MS changes (lethargy), CT head obtained in ED which showed no acute intracranial process  - continue holding AC as risks outweigh benefits at this time

## 2017-07-31 NOTE — DIETITIAN INITIAL EVALUATION ADULT. - ETIOLOGY
In the context of  & chronic illness (Hx of dementia & Dx of respiratory failure in setting of chronic aspiration).

## 2017-07-31 NOTE — PROGRESS NOTE ADULT - PROBLEM SELECTOR PLAN 1
- patient had worsening hypercapnia despite BiPAP in the setting of COPD and likely chronic aspiration w/ superimposed PNA  - RVP negative, BCx showing NGTD  - required mechanical ventilation, was extubated and now on 2L NC  - monitor O2 requirements, wean off NC as tolerated  - c/w vanc and zosyn (day 3), monitor vanc trough  - monitor VS, trend CBC, assess for signs/symptoms of infection

## 2017-07-31 NOTE — DIETITIAN INITIAL EVALUATION ADULT. - OTHER INFO
Pt. unable to provide nutritional Hx.  NPO (concerns for aspiration PNA, per chart)... pending swallow evaluation.  Per prior admission chart review weight Hx as follows: 86.6kg (July 2016), 71.2kg (May 2017), 64.3kg (June 2017), 66.4kg (7/30/17).  Indicative of at least 20.2kg, significant 23.3% decrease x 1 year.  Pt. also noted w edema.  No noted/reported nausea/vomiting/diarrhea/constipation or food allergies.

## 2017-07-31 NOTE — CHART NOTE - NSCHARTNOTEFT_GEN_A_CORE
NUTRITION SERVICES     Upon Nutritional Assessment by the Registered Dietitian your patient was determined to meet criteria/ has evidence of the following diagnosis/diagnoses:  [ ] Mild Protein Calorie Malnutrition   [X] Moderate Protein Calorie Malnutrition   [ ] Severe Protein Calorie Malnutrition   [ ] Unspecified Protein Calorie Malnutrition   [ ] Underweight / BMI <19  [ ] Morbid Obesity / BMI >40    Findings as based on:  •  Comprehensive nutritional assessment and consultation    Please refer to Initial Dietitian Evaluation via documents section of [email or web address] EMR for further recommendations.    Farheen Oro RDN, CDN   pager: [phone]

## 2017-07-31 NOTE — PROGRESS NOTE ADULT - PROBLEM SELECTOR PLAN 5
- patient with history of Afib not on AC due to history of ICH last year  - AAOx1 at baseline per daughter, however given MS changes (lethargy), CT head obtained in ED which showed no acute intracranial process  - continue holding AC as risks outweigh benefits at this time Nubia Julien - patient had positive UA, UCx growing gram negative rods  - c/w zosyn (day 3)  - f/u UCx and sensitivities.

## 2017-07-31 NOTE — DIETITIAN INITIAL EVALUATION ADULT. - SIGNS/SYMPTOMS
Suboptimal nutrient intake, ~20.2kg, significant 23.3% decrease x 1 year, 1+ gen & 2+ b/l feet edema

## 2017-07-31 NOTE — PROGRESS NOTE ADULT - PROBLEM SELECTOR PLAN 4
- patient was hypothermic to 93 overnight likely 2/2 infection (PNA and UTI)  - T continuing to trend up  - monitor VS Not in acute distress at this time.  No indication for systemic steroid at this time.  Symptomatic treatment with PRN B-agonist.  Will clarify if patient is on inhaled steroid/long duration B-agonist at home.

## 2017-07-31 NOTE — PROGRESS NOTE ADULT - SUBJECTIVE AND OBJECTIVE BOX
YUNG KRAUS  88y  Female      Patient is a 88y old  Female who presents with a chief complaint of resp failure (29 Jul 2017 01:08)      INTERVAL HPI/OVERNIGHT EVENTS:    Pt was hypothermic overnight w/ minimum T 93. T trending up. Also was hypertensive w/ SBP in the 170s. Pt continues to endorse SOB. Denies fever, CP, cough, and abd pain.    T(C): 36.2 (07-31-17 @ 13:20), Max: 36.5 (07-31-17 @ 06:06)  HR: 59 (07-31-17 @ 13:20) (59 - 82)  BP: 146/93 (07-31-17 @ 13:20) (145/76 - 175/92)  RR: 18 (07-31-17 @ 13:20) (18 - 25)  SpO2: 100% (07-31-17 @ 13:20) (93% - 100%)  Wt(kg): --Vital Signs Last 24 Hrs  T(C): 36.2 (31 Jul 2017 13:20), Max: 36.5 (31 Jul 2017 06:06)  T(F): 97.2 (31 Jul 2017 13:20), Max: 97.7 (31 Jul 2017 06:06)  HR: 59 (31 Jul 2017 13:20) (59 - 82)  BP: 146/93 (31 Jul 2017 13:20) (145/76 - 175/92)  BP(mean): 88 (30 Jul 2017 19:31) (88 - 131)  RR: 18 (31 Jul 2017 13:20) (18 - 25)  SpO2: 100% (31 Jul 2017 13:20) (93% - 100%)  Wt(kg): --    PHYSICAL EXAM:  GENERAL: NAD  HEAD:  Atraumatic, Normocephalic  EYES: EOMI, conjunctiva and sclera clear  NECK: Supple  NERVOUS SYSTEM:  Alert & Oriented X1-2  CHEST/LUNG: Clear to auscultation bilaterally; No rales, rhonchi, or wheezing  HEART: Regular rate and rhythm; No murmurs, rubs, or gallops  ABDOMEN: Soft, Nontender, Nondistended; Bowel sounds present  EXTREMITIES:  2+ Peripheral Pulses, No clubbing, cyanosis, or edema  SKIN: No rashes or lesions    Consultant(s) Notes Reviewed:  [x ] YES  [ ] NO  Care Discussed with Consultants/Other Providers [ x] YES  [ ] NO    LABS:                        13.6   10.51 )-----------( 209      ( 31 Jul 2017 02:40 )             44.4     07-31    141  |  105  |  40<H>  ----------------------------<  84  3.9   |  20<L>  |  1.57<H>    Ca    8.8      31 Jul 2017 02:40  Phos  4.1     07-31  Mg     2.4     07-31    TPro  5.5<L>  /  Alb  2.5<L>  /  TBili  0.6  /  DBili  x   /  AST  18  /  ALT  13  /  AlkPhos  100  07-30        CAPILLARY BLOOD GLUCOSE          ABG - ( 30 Jul 2017 15:50 )  pH: 7.45  /  pCO2: 30    /  pO2: 168   / HCO3: 23    / Base Excess: -2.9  /  SaO2: 99.8      Creatinine, Random Urine (07.30.17 @ 12:45)    Creatinine, Random Urine: 38.07: * * RANDOM URINARY CREATININE * *  REFERENCE RANGE    NORMAL = 15-30 MG/KG BODY WEIGHT/DAY mg/dL    Electrolytes, Urine (07.30.17 @ 12:45)    Sodium, Random Urine: 28: Reference range not established for this test meq/L    Potassium, Random Urine: 13.7: Reference range not established for this test meq/L    Chloride, Random Urine: 11: Reference range not established for this test mmol/L    Culture - Urine (07.29.17 @ 03:54)    Culture - Urine:   GNRID^Gram Neg Ermias To Be Identified  COLONY COUNT: > = 100,000 CFU/ML    Specimen Source: URINE CATHETER    Culture - Respiratory with Gram Stain (07.29.17 @ 08:38)    Gram Stain Sputum:   GPCCH^Gram Pos Cocci in Chains  QUANTITY OF BACTERIA SEEN: FEW (2+)  GPR^Gram Positive Rods  QUANTITY OF BACTERIA SEEN: RARE (1+)  WBC^White Blood Cells  QNTY CELLS IN GRAM STAIN: FEW (2+)    Culture - Respiratory:   NRF^Normal Respiratory Elizabet  QUANTITY OF GROWTH: MANY  STAU^Staphylococcus aureus  QUANTITY OF GROWTH: MODERATE    Specimen Source: ENDOTRACHEAL SPECIMEN    Culture - Blood (07.29.17 @ 03:59)    Culture - Blood:   NO ORGANISMS ISOLATED  NO ORGANISMS ISOLATED AT 48 HRS.    Specimen Source: BLOOD    Vancomycin Level, Random (07.31.17 @ 02:40)    Vancomycin Level, Random: 25.8: Therapeutic ranges have not been established for random  vancomycin. Interpret results in context of patient's  clinical condition and time sample was drawn relative to  peak and trough therapeutic ranges. Therapeutic ranges for  peak vancomycin are 225.8: 5-50 and for trough vancomycin 10-20  with 15-20 mcg/mL used for complicated infections. ug/mL    RADIOLOGY & ADDITIONAL TESTS:    Imaging Personally Reviewed:  [ ] YES  [ ] NO    HEALTH ISSUES - PROBLEM Dx:  EKG abnormalities: EKG abnormalities  CKD (chronic kidney disease): CKD (chronic kidney disease)  Atrial fibrillation: Atrial fibrillation  Aspiration into airway: Aspiration into airway  Acute respiratory failure with hypercapnia: Acute respiratory failure with hypercapnia          CONTACT #: [id number] YUNG KRAUS  88y  Female      Patient is a 88y old  Female who presents with a chief complaint of resp failure (29 Jul 2017 01:08)      INTERVAL HPI/OVERNIGHT EVENTS:    Pt was hypothermic overnight w/ minimum T 93. T trending up. Also was hypertensive w/ SBP in the 170s. Pt continues to endorse SOB. Denies fever, CP, cough, and abd pain.  Feels weak overall.    T(C): 36.2 (07-31-17 @ 13:20), Max: 36.5 (07-31-17 @ 06:06)  HR: 59 (07-31-17 @ 13:20) (59 - 82)  BP: 146/93 (07-31-17 @ 13:20) (145/76 - 175/92)  RR: 18 (07-31-17 @ 13:20) (18 - 25)  SpO2: 100% (07-31-17 @ 13:20) (93% - 100%)  Wt(kg): --Vital Signs Last 24 Hrs  T(C): 36.2 (31 Jul 2017 13:20), Max: 36.5 (31 Jul 2017 06:06)  T(F): 97.2 (31 Jul 2017 13:20), Max: 97.7 (31 Jul 2017 06:06)  HR: 59 (31 Jul 2017 13:20) (59 - 82)  BP: 146/93 (31 Jul 2017 13:20) (145/76 - 175/92)  BP(mean): 88 (30 Jul 2017 19:31) (88 - 131)  RR: 18 (31 Jul 2017 13:20) (18 - 25)  SpO2: 100% (31 Jul 2017 13:20) (93% - 100%)  Wt(kg): --    PHYSICAL EXAM:  GENERAL: NAD  HEAD:  Atraumatic, Normocephalic  EYES: EOMI, conjunctiva and sclera clear  NECK: Supple  NERVOUS SYSTEM:  Alert & Oriented X1-2  CHEST/LUNG: Clear to auscultation bilaterally; No rales, rhonchi, or wheezing. Poor respiratory effort.  HEART: Regular rate and rhythm; No murmurs, rubs, or gallops  ABDOMEN: Soft, Nontender, Nondistended; Bowel sounds present  EXTREMITIES:  2+ Peripheral Pulses, No clubbing, cyanosis, or edema  SKIN: No rashes or lesions    Consultant(s) Notes Reviewed:  [x ] YES  [ ] NO - Nutrition, cardiology  Care Discussed with Consultants/Other Providers [ x] YES  [ ] NO    LABS:                        13.6   10.51 )-----------( 209      ( 31 Jul 2017 02:40 )             44.4     07-31    141  |  105  |  40<H>  ----------------------------<  84  3.9   |  20<L>  |  1.57<H>    Ca    8.8      31 Jul 2017 02:40  Phos  4.1     07-31  Mg     2.4     07-31    TPro  5.5<L>  /  Alb  2.5<L>  /  TBili  0.6  /  DBili  x   /  AST  18  /  ALT  13  /  AlkPhos  100  07-30        CAPILLARY BLOOD GLUCOSE          ABG - ( 30 Jul 2017 15:50 )  pH: 7.45  /  pCO2: 30    /  pO2: 168   / HCO3: 23    / Base Excess: -2.9  /  SaO2: 99.8      Creatinine, Random Urine (07.30.17 @ 12:45)    Creatinine, Random Urine: 38.07: * * RANDOM URINARY CREATININE * *  REFERENCE RANGE    NORMAL = 15-30 MG/KG BODY WEIGHT/DAY mg/dL    Electrolytes, Urine (07.30.17 @ 12:45)    Sodium, Random Urine: 28: Reference range not established for this test meq/L    Potassium, Random Urine: 13.7: Reference range not established for this test meq/L    Chloride, Random Urine: 11: Reference range not established for this test mmol/L    Culture - Urine (07.29.17 @ 03:54)    Culture - Urine:   GNRID^Gram Neg Ermias To Be Identified  COLONY COUNT: > = 100,000 CFU/ML    Specimen Source: URINE CATHETER    Culture - Respiratory with Gram Stain (07.29.17 @ 08:38)    Gram Stain Sputum:   GPCCH^Gram Pos Cocci in Chains  QUANTITY OF BACTERIA SEEN: FEW (2+)  GPR^Gram Positive Rods  QUANTITY OF BACTERIA SEEN: RARE (1+)  WBC^White Blood Cells  QNTY CELLS IN GRAM STAIN: FEW (2+)    Culture - Respiratory:   NRF^Normal Respiratory Elizabet  QUANTITY OF GROWTH: MANY  STAU^Staphylococcus aureus  QUANTITY OF GROWTH: MODERATE    Specimen Source: ENDOTRACHEAL SPECIMEN    Culture - Blood (07.29.17 @ 03:59)    Culture - Blood:   NO ORGANISMS ISOLATED  NO ORGANISMS ISOLATED AT 48 HRS.    Specimen Source: BLOOD    Vancomycin Level, Random (07.31.17 @ 02:40)    Vancomycin Level, Random: 25.8: Therapeutic ranges have not been established for random  vancomycin. Interpret results in context of patient's  clinical condition and time sample was drawn relative to  peak and trough therapeutic ranges. Therapeutic ranges for  peak vancomycin are 225.8: 5-50 and for trough vancomycin 10-20  with 15-20 mcg/mL used for complicated infections. ug/mL    RADIOLOGY & ADDITIONAL TESTS:    Imaging Personally Reviewed:  [x] YES  [ ] NO    HEALTH ISSUES - PROBLEM Dx:  EKG abnormalities: EKG abnormalities  CKD (chronic kidney disease): CKD (chronic kidney disease)  Atrial fibrillation: Atrial fibrillation  Aspiration into airway: Aspiration into airway  Acute respiratory failure with hypercapnia: Acute respiratory failure with hypercapnia          CONTACT #: [id number]

## 2017-07-31 NOTE — DIETITIAN INITIAL EVALUATION ADULT. - NS AS NUTRI INTERV COLLABORAT
1)Advance to PO diet, if deemed appropriate & as based on swallow evaluation findings and SLP recommendations               2)Consider goal of care discussion re: long term nutritional goals                  3)Obtain daily weights

## 2017-08-01 LAB
-  AMIKACIN: SIGNIFICANT CHANGE UP
-  AMPICILLIN/SULBACTAM: SIGNIFICANT CHANGE UP
-  AMPICILLIN: SIGNIFICANT CHANGE UP
-  AZTREONAM: SIGNIFICANT CHANGE UP
-  CEFAZOLIN: SIGNIFICANT CHANGE UP
-  CEFAZOLIN: SIGNIFICANT CHANGE UP
-  CEFEPIME: SIGNIFICANT CHANGE UP
-  CEFOXITIN: SIGNIFICANT CHANGE UP
-  CEFTAZIDIME: SIGNIFICANT CHANGE UP
-  CEFTRIAXONE: SIGNIFICANT CHANGE UP
-  CIPROFLOXACIN: SIGNIFICANT CHANGE UP
-  CIPROFLOXACIN: SIGNIFICANT CHANGE UP
-  CLINDAMYCIN: SIGNIFICANT CHANGE UP
-  ERTAPENEM: SIGNIFICANT CHANGE UP
-  ERYTHROMYCIN: SIGNIFICANT CHANGE UP
-  GENTAMICIN: SIGNIFICANT CHANGE UP
-  GENTAMICIN: SIGNIFICANT CHANGE UP
-  LEVOFLOXACIN: SIGNIFICANT CHANGE UP
-  MEROPENEM: SIGNIFICANT CHANGE UP
-  MOXIFLOXACIN(AEROBIC): SIGNIFICANT CHANGE UP
-  NITROFURANTOIN: SIGNIFICANT CHANGE UP
-  OXACILLIN: SIGNIFICANT CHANGE UP
-  PENICILLIN: SIGNIFICANT CHANGE UP
-  PIPERACILLIN/TAZOBACTAM: SIGNIFICANT CHANGE UP
-  RIFAMPIN.: SIGNIFICANT CHANGE UP
-  TETRACYCLINE: SIGNIFICANT CHANGE UP
-  TOBRAMYCIN: SIGNIFICANT CHANGE UP
-  TRIMETHOPRIM/SULFAMETHOXAZOLE: SIGNIFICANT CHANGE UP
-  TRIMETHOPRIM/SULFAMETHOXAZOLE: SIGNIFICANT CHANGE UP
-  VANCOMYCIN: SIGNIFICANT CHANGE UP
ALBUMIN SERPL ELPH-MCNC: 3 G/DL — LOW (ref 3.3–5)
ALP SERPL-CCNC: 98 U/L — SIGNIFICANT CHANGE UP (ref 40–120)
ALT FLD-CCNC: 14 U/L — SIGNIFICANT CHANGE UP (ref 4–33)
AST SERPL-CCNC: 19 U/L — SIGNIFICANT CHANGE UP (ref 4–32)
BACTERIA SPT RESP CULT: SIGNIFICANT CHANGE UP
BACTERIA UR CULT: SIGNIFICANT CHANGE UP
BASOPHILS # BLD AUTO: 0.11 K/UL — SIGNIFICANT CHANGE UP (ref 0–0.2)
BASOPHILS NFR BLD AUTO: 1.3 % — SIGNIFICANT CHANGE UP (ref 0–2)
BILIRUB SERPL-MCNC: 0.8 MG/DL — SIGNIFICANT CHANGE UP (ref 0.2–1.2)
BUN SERPL-MCNC: 34 MG/DL — HIGH (ref 7–23)
CALCIUM SERPL-MCNC: 8.6 MG/DL — SIGNIFICANT CHANGE UP (ref 8.4–10.5)
CHLORIDE SERPL-SCNC: 107 MMOL/L — SIGNIFICANT CHANGE UP (ref 98–107)
CO2 SERPL-SCNC: 16 MMOL/L — LOW (ref 22–31)
CREAT SERPL-MCNC: 1.49 MG/DL — HIGH (ref 0.5–1.3)
EOSINOPHIL # BLD AUTO: 0.11 K/UL — SIGNIFICANT CHANGE UP (ref 0–0.5)
EOSINOPHIL NFR BLD AUTO: 1.3 % — SIGNIFICANT CHANGE UP (ref 0–6)
GLUCOSE SERPL-MCNC: 80 MG/DL — SIGNIFICANT CHANGE UP (ref 70–99)
GRAM STN SPT: SIGNIFICANT CHANGE UP
HCT VFR BLD CALC: 44.5 % — SIGNIFICANT CHANGE UP (ref 34.5–45)
HGB BLD-MCNC: 12.9 G/DL — SIGNIFICANT CHANGE UP (ref 11.5–15.5)
IMM GRANULOCYTES # BLD AUTO: 0.04 # — SIGNIFICANT CHANGE UP
IMM GRANULOCYTES NFR BLD AUTO: 0.5 % — SIGNIFICANT CHANGE UP (ref 0–1.5)
LYMPHOCYTES # BLD AUTO: 2.29 K/UL — SIGNIFICANT CHANGE UP (ref 1–3.3)
LYMPHOCYTES # BLD AUTO: 27.1 % — SIGNIFICANT CHANGE UP (ref 13–44)
MAGNESIUM SERPL-MCNC: 2.3 MG/DL — SIGNIFICANT CHANGE UP (ref 1.6–2.6)
MCHC RBC-ENTMCNC: 26.3 PG — LOW (ref 27–34)
MCHC RBC-ENTMCNC: 29 % — LOW (ref 32–36)
MCV RBC AUTO: 90.6 FL — SIGNIFICANT CHANGE UP (ref 80–100)
METHOD TYPE: SIGNIFICANT CHANGE UP
METHOD TYPE: SIGNIFICANT CHANGE UP
MONOCYTES # BLD AUTO: 1 K/UL — HIGH (ref 0–0.9)
MONOCYTES NFR BLD AUTO: 11.8 % — SIGNIFICANT CHANGE UP (ref 2–14)
NEUTROPHILS # BLD AUTO: 4.9 K/UL — SIGNIFICANT CHANGE UP (ref 1.8–7.4)
NEUTROPHILS NFR BLD AUTO: 58 % — SIGNIFICANT CHANGE UP (ref 43–77)
NRBC # FLD: 0 — SIGNIFICANT CHANGE UP
ORGANISM # SPEC MICROSCOPIC CNT: SIGNIFICANT CHANGE UP
PHOSPHATE SERPL-MCNC: 3.3 MG/DL — SIGNIFICANT CHANGE UP (ref 2.5–4.5)
PLATELET # BLD AUTO: 167 K/UL — SIGNIFICANT CHANGE UP (ref 150–400)
PMV BLD: 10.5 FL — SIGNIFICANT CHANGE UP (ref 7–13)
POTASSIUM SERPL-MCNC: 3.9 MMOL/L — SIGNIFICANT CHANGE UP (ref 3.5–5.3)
POTASSIUM SERPL-SCNC: 3.9 MMOL/L — SIGNIFICANT CHANGE UP (ref 3.5–5.3)
PROT SERPL-MCNC: 6.4 G/DL — SIGNIFICANT CHANGE UP (ref 6–8.3)
RBC # BLD: 4.91 M/UL — SIGNIFICANT CHANGE UP (ref 3.8–5.2)
RBC # FLD: 18.2 % — HIGH (ref 10.3–14.5)
SODIUM SERPL-SCNC: 141 MMOL/L — SIGNIFICANT CHANGE UP (ref 135–145)
VANCOMYCIN FLD-MCNC: 16.9 UG/ML — SIGNIFICANT CHANGE UP
WBC # BLD: 8.45 K/UL — SIGNIFICANT CHANGE UP (ref 3.8–10.5)
WBC # FLD AUTO: 8.45 K/UL — SIGNIFICANT CHANGE UP (ref 3.8–10.5)

## 2017-08-01 PROCEDURE — 99233 SBSQ HOSP IP/OBS HIGH 50: CPT | Mod: GC

## 2017-08-01 PROCEDURE — 93010 ELECTROCARDIOGRAM REPORT: CPT

## 2017-08-01 PROCEDURE — 74230 X-RAY XM SWLNG FUNCJ C+: CPT | Mod: 26

## 2017-08-01 RX ORDER — METOPROLOL TARTRATE 50 MG
5 TABLET ORAL EVERY 6 HOURS
Qty: 0 | Refills: 0 | Status: DISCONTINUED | OUTPATIENT
Start: 2017-08-01 | End: 2017-08-02

## 2017-08-01 RX ORDER — VANCOMYCIN HCL 1 G
750 VIAL (EA) INTRAVENOUS ONCE
Qty: 0 | Refills: 0 | Status: COMPLETED | OUTPATIENT
Start: 2017-08-01 | End: 2017-08-01

## 2017-08-01 RX ADMIN — PIPERACILLIN AND TAZOBACTAM 25 GRAM(S): 4; .5 INJECTION, POWDER, LYOPHILIZED, FOR SOLUTION INTRAVENOUS at 02:18

## 2017-08-01 RX ADMIN — Medication 5 MILLIGRAM(S): at 12:55

## 2017-08-01 RX ADMIN — HEPARIN SODIUM 5000 UNIT(S): 5000 INJECTION INTRAVENOUS; SUBCUTANEOUS at 12:54

## 2017-08-01 RX ADMIN — HEPARIN SODIUM 5000 UNIT(S): 5000 INJECTION INTRAVENOUS; SUBCUTANEOUS at 05:43

## 2017-08-01 RX ADMIN — Medication 5 MILLIGRAM(S): at 17:18

## 2017-08-01 RX ADMIN — Medication 3 MILLILITER(S): at 21:58

## 2017-08-01 RX ADMIN — Medication 25 MICROGRAM(S): at 05:43

## 2017-08-01 RX ADMIN — PIPERACILLIN AND TAZOBACTAM 25 GRAM(S): 4; .5 INJECTION, POWDER, LYOPHILIZED, FOR SOLUTION INTRAVENOUS at 15:54

## 2017-08-01 RX ADMIN — SODIUM CHLORIDE 75 MILLILITER(S): 9 INJECTION INTRAMUSCULAR; INTRAVENOUS; SUBCUTANEOUS at 13:02

## 2017-08-01 RX ADMIN — HEPARIN SODIUM 5000 UNIT(S): 5000 INJECTION INTRAVENOUS; SUBCUTANEOUS at 22:21

## 2017-08-01 RX ADMIN — Medication 3 MILLILITER(S): at 03:05

## 2017-08-01 RX ADMIN — Medication 3 MILLILITER(S): at 15:28

## 2017-08-01 RX ADMIN — SODIUM CHLORIDE 75 MILLILITER(S): 9 INJECTION INTRAMUSCULAR; INTRAVENOUS; SUBCUTANEOUS at 22:21

## 2017-08-01 RX ADMIN — Medication 150 MILLIGRAM(S): at 13:00

## 2017-08-01 RX ADMIN — SODIUM CHLORIDE 75 MILLILITER(S): 9 INJECTION INTRAMUSCULAR; INTRAVENOUS; SUBCUTANEOUS at 00:31

## 2017-08-01 RX ADMIN — SODIUM CHLORIDE 75 MILLILITER(S): 9 INJECTION INTRAMUSCULAR; INTRAVENOUS; SUBCUTANEOUS at 17:18

## 2017-08-01 NOTE — PROGRESS NOTE ADULT - PROBLEM SELECTOR PLAN 5
- aspiration precaution  - likely chronically aspirating, aspiration PNA as cause of acute shortness of breath prior to admission  - c/w Zosyn, Vanco

## 2017-08-01 NOTE — PROGRESS NOTE ADULT - ASSESSMENT
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

## 2017-08-01 NOTE — PROGRESS NOTE ADULT - SUBJECTIVE AND OBJECTIVE BOX
Patient is a 88y old  Female who presents with a chief complaint of resp failure (2017 01:08)      SUBJECTIVE / OVERNIGHT EVENTS:  Patient seen and examined at bedside. MAR was called by night float intern as pt was in Afib with RVR up to 120s. Pt was asymptomatic. EKG was done showing Afib with RVR. When MAR arrived, pt's HR had increased to 140s, irregular. Placed on Zoll, w/ /110. Planned to give diltiazem 5mg IV x 1 but HR decreased without intervention. Conduction abnormalities subsequently identified on the Zoll. Repeat EKG obtained which showed Aflutter. Pt remained asymptomatic with /110 and was transferred to telemetry. This AM pt endorsed nausea but declined antiemetics. Also c/o feeling cold. Denies fever, CP, cough, SOB, and abdominal pain.     MEDICATIONS  (STANDING):  piperacillin/tazobactam IVPB. 3.375 Gram(s) IV Intermittent every 12 hours  heparin  Injectable 5000 Unit(s) SubCutaneous every 8 hours  ALBUTerol/ipratropium for Nebulization 3 milliLiter(s) Nebulizer every 6 hours  levothyroxine Injectable 25 MICROGram(s) IV Push daily  sodium chloride 0.9%. 1000 milliLiter(s) (75 mL/Hr) IV Continuous <Continuous>  metoprolol Injectable 5 milliGRAM(s) IV Push every 6 hours  vancomycin  IVPB 750 milliGRAM(s) IV Intermittent once    MEDICATIONS  (PRN):        CAPILLARY BLOOD GLUCOSE      Vital Signs Last 24 Hrs  T(C): 36.8 (01 Aug 2017 10:52), Max: 37.1 (2017 22:11)  T(F): 98.3 (01 Aug 2017 10:52), Max: 98.8 (2017 22:11)  HR: 134 (01 Aug 2017 10:52) (58 - 134)  BP: 168/130 (01 Aug 2017 10:52) (137/109 - 168/130)  BP(mean): 110 (01 Aug 2017 07:20) (110 - 110)  RR: 20 (01 Aug 2017 07:20) (18 - 20)  SpO2: 97% (01 Aug 2017 07:20) (93% - 100%)  I&O's Summary    2017 07:01  -  01 Aug 2017 07:00  --------------------------------------------------------  IN: 850 mL / OUT: 0 mL / NET: 850 mL        PHYSICAL EXAM:  GENERAL: NAD  HEAD:  Atraumatic, Normocephalic  EYES: EOMI, conjunctiva and sclera clear  NECK: Supple  CHEST/LUNG: Clear to auscultation bilaterally; No wheeze; Poor inspiratory effort  HEART: Irregularly irregular; No murmurs, rubs, or gallops  ABDOMEN: Soft, Nontender, Nondistended; Bowel sounds present  EXTREMITIES:  No clubbing, cyanosis, or edema  NEUROLOGY: Alert, responding to questions  SKIN: No rashes or lesions    LABS:                        12.9   8.45  )-----------( 167      ( 01 Aug 2017 06:50 )             44.5     WBC Trend: 8.45<--, 10.51<--, 10.09<--  08    141  |  107  |  34<H>  ----------------------------<  80  3.9   |  16<L>  |  1.49<H>    Ca    8.6      01 Aug 2017 06:50  Phos  3.3     -  Mg     2.3         TPro  6.4  /  Alb  3.0<L>  /  TBili  0.8  /  DBili  x   /  AST  19  /  ALT  14  /  AlkPhos  98      Creatinine Trend: 1.49<--, 1.57<--, 1.54<--, 1.60<--, 1.57<--    Vancomycin Level, Random (17 @ 06:50)    Vancomycin Level, Random: 16.9: Therapeutic ranges have not been established for random  vancomycin. Interpret results in context of patient's  clinical condition and time sample was drawn relative to  peak and trough therapeutic ranges. Therapeutic ranges for  peak vancomycin are 216.9: 5-50 and for trough vancomycin 10-20  with 15-20 mcg/mL used for complicated infections. ug/mL    Culture - Respiratory with Gram Stain (17 @ 08:38)    -  Ciprofloxacin: S <=1 COREEN    -  Erythromycin: R >4 COREEN    -  Oxacillin: S 0.5 COREEN    -  Trimethoprim/Sulfamethoxazole: S <=0.5/9.5 COREEN    -  Rifampin: S <=1 COREEN    -  Vancomycin: S 1 COREEN    -  Clindamycin: R This isolate is presumed to be resistant on the basis of  detection of inducible Clindamycin resistance.  Clindamycin  still might be effective in some patients.    -  Gentamicin: S <=4 COREEN    -  Tetra/Doxy: S <=4 COREEN    -  Cefazolin: S <=4 COREEN    -  Moxifloxacin(Aerobic): S <=0.5 COREEN    -  Penicillin: R >8 COREEN    Culture - Respiratory:   QUANTITY OF GROWTH: MODERATE    Gram Stain Sputum:   GPCCH Gram Pos Cocci in Chains  QUANTITY OF BACTERIA SEEN: FEW (2+)  GPR^Gram Positive Rods  QUANTITY OF BACTERIA SEEN: RARE (1+)  WBC^White Blood Cells  QNTY CELLS IN GRAM STAIN: FEW (2+)    Specimen Source: ENDOTRACHEAL SPECIMEN    Organism Identification: Staphylococcus aureus  Normal Respiratory Elizabet    Organism: Normal Respiratory Elizabet  QUANTITY OF GROWTH: MODERATE    Organism: Staphylococcus aureus    Method Type: MICROSCAN POS COMBO 34    Culture - Urine (17 @ 03:54)    -  Cefepime: S <=4 COREEN    -  Ceftazidime: S 4 COREEN    -  Ertapenem: S <=1 COREEN    -  Gentamicin: S <=4 COREEN    -  Levofloxacin: S <=2 COREEN    Culture - Urine:   COLONY COUNT: > = 100,000 CFU/ML    -  Ampicillin/Sulbactam: S <=8/4 COREEN    -  Cefazolin: S <=8 COREEN    -  Meropenem: S <=1 COREEN    -  Tobramycin: S <=4 COREEN    -  Trimethoprim/Sulfamethoxazole: S <=2/38 COREEN    -  Ampicillin: R <=8 COREEN    -  Ceftriaxone: S <=1 COREEN    -  Piperacillin/Tazobactam: S <=16 COREEN    -  Amikacin: S <=16 COREEN    -  Aztreonam: R >16 COREEN    -  Cefoxitin: S <=8 COREEN    -  Ciprofloxacin: S <=1 COREEN    -  Nitrofurantoin: R >64 COREEN    Specimen Source: URINE CATHETER    Organism Identification: Proteus mirabilis    Organism: Proteus mirabilis    Method Type: MICROSCAN NEG URINE COMBO 61    RADIOLOGY & ADDITIONAL TESTS:    Imaging Personally Reviewed:  EC17  Sinus rhythm with 1st degree A-V block with Premature ventricular complexes or Fusion complexes  Biatrial enlargement  Marked ST abnormality, possible inferior subendocardial injury  Marked ST abnormality, possible anterolateral subendocardial injury  Abnormal ECG    Consultant(s) Notes Reviewed:      Care Discussed with Consultants/Other Providers:

## 2017-08-01 NOTE — SWALLOW VFSS/MBS ASSESSMENT ADULT - PHARYNGEAL PHASE COMMENTS
delayed initiation of the pharyngeal swallow, reduced laryngeal elevation, reduced tongue base retraction, adequate pharyngeal constriction delayed initiation of the pharyngeal swallow, reduced laryngeal elevation, reduced tongue base retraction, reduced pharyngeal constriction

## 2017-08-01 NOTE — CHART NOTE - NSCHARTNOTEFT_GEN_A_CORE
Medicine Night Float: Event Note    Event: Afib with RVR    We were called because pt was tachycardic. EKG showed afib w/RVR. Patient converted by herself, however, she continued on and off with hypertension and Afib.     Assessment/Plan:  Pt. with new onset Afib w/ RVR.   -pt transferred to tele floor for closer observation    T(C): 36.2 (08-01-17 @ 07:20), Max: 37.1 (07-31-17 @ 22:11)  T(F): 97.2 (08-01-17 @ 07:20)  HR: 74 (08-01-17 @ 07:20) (58 - 110)  BP: 165/102 (08-01-17 @ 07:20) (137/109 - 165/102)  BP(mean): 110 (08-01-17 @ 07:20)  RR: 20 (08-01-17 @ 07:20) (18 - 20)  SpO2: 97% (08-01-17 @ 07:20) (93% - 100%)    Cl Tapia  R1, Medicine, Night Float  Pager: [phone]

## 2017-08-01 NOTE — PROGRESS NOTE ADULT - PROBLEM SELECTOR PLAN 2
- maintenance fluids with NS @ 75 cc/hr  - pt failed cinesophagram, will need to discuss PEG tube w/ family but explain that it will only assist w/ feeding and will not stop aspiration, pt's dysphagia is longstanding and likely irreversible, and feeding is unlikely to meet pt's caloric requirements  - f/u w/ GI  - NPO for now - maintenance fluids with NS @ 75 cc/hr  - daughter HCP agreeable for PEG  - GI consult - house  - NG tube to be placed for TF and meds in meanwhile.

## 2017-08-01 NOTE — PROGRESS NOTE ADULT - PROBLEM SELECTOR PLAN 1
- patient with history of Afib not on AC due to history of ICH last year  - went into Afib w/ RVR this AM, started on metoprolol 5 IV q6h  - consult EP regarding PPM interrogation  - will discuss about A/C depending on patients ambulation status - r/o fall risk  - AAOx1 at baseline per daughter, however given MS changes (lethargy), CT head obtained in ED which showed no acute intracranial process  - continue holding AC as risks outweigh benefits at this time

## 2017-08-01 NOTE — SWALLOW VFSS/MBS ASSESSMENT ADULT - RECOMMENDED CONSISTENCY
1.) NPO with Consideration for Alternate Means of Nutrition. Discussion with patient/family regarding plan of care for nutritional goals of care with an understanding of the risk/benefits (oral vs nonoral means of nutrition).   2.) Aspiration Precautions  3.) Maintain Good Oral Hygiene Care

## 2017-08-01 NOTE — SWALLOW VFSS/MBS ASSESSMENT ADULT - COMMENTS
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

## 2017-08-01 NOTE — SWALLOW VFSS/MBS ASSESSMENT ADULT - DIAGNOSTIC IMPRESSIONS
Patient presents with Mild Oral Stage and Moderate to Severe Pharyngeal Stage Dysphagia. The Oral Stage is characterized by adequate oral containment, slow bolus manipulation, slow tongue motion with slow anterior to posterior transfer of the bolus with adequate oral clearance post swallow.  The Pharyngeal Stage is characterized by delayed initiation of the pharyngeal swallow (bolus head is at the pyriforms for Nectar Thick Liquids), reduced laryngeal elevation, reduced/poor tongue base retraction resulting in moderate to severe vallecular residue post primary swallow; and adequate pharyngeal constriction.  There is moderate to severe pharyngeal clearance deficit located primarily in the vallecular space post primary swallow for puree consistency.  There was Laryngeal Penetration during and after the swallow from pharyngeal residue/stasis entering into the laryngeal vestibule above the level of the vocal folds without retrieval leaving residue in the laryngeal vestibule. A liquid wash benefits to assist with partial pharyngeal clearance however, patient at risk from pharyngeal residue/stasis entering into the laryngeal vestibule without retrieval. Patient presents with Mild Oral Stage and Moderate to Severe Pharyngeal Stage Dysphagia. The Oral Stage is characterized by adequate oral containment, slow bolus manipulation, slow tongue motion with slow anterior to posterior transfer of the bolus with adequate oral clearance post swallow.  The Pharyngeal Stage is characterized by delayed initiation of the pharyngeal swallow (bolus head is at the pyriforms for Nectar Thick Liquids), reduced laryngeal elevation, reduced/poor tongue base retraction resulting in moderate to severe vallecular residue post primary swallow; and adequate pharyngeal constriction.  There is moderate to severe pharyngeal clearance deficit located primarily in the vallecular space post primary swallow for puree consistency.  There was Laryngeal Penetration during and after the swallow from pharyngeal residue/stasis entering into the laryngeal vestibule above the level of the vocal folds without retrieval leaving residue in the laryngeal vestibule. A liquid wash benefits to assist with partial pharyngeal clearance however, patient at risk from pharyngeal residue/stasis entering into the laryngeal vestibule without retrieval.    Of Note: Patient's shoulder girdle limited viewing of the trachea despite repositioning attempts. Patient presents with Mild Oral Stage and Moderate to Severe Pharyngeal Stage Dysphagia. The Oral Stage is characterized by adequate oral containment, slow bolus manipulation, slow tongue motion with slow anterior to posterior transfer of the bolus with adequate oral clearance post swallow.  The Pharyngeal Stage is characterized by delayed initiation of the pharyngeal swallow (bolus head is at the pyriforms for Nectar Thick Liquids), reduced laryngeal elevation, reduced/poor tongue base retraction resulting in moderate to severe vallecular residue post primary swallow; and adequate pharyngeal constriction.  There is moderate to severe pharyngeal clearance deficit located primarily in the vallecular space post primary swallow for puree consistency.  There was Laryngeal Penetration during and after the swallow from pharyngeal residue/stasis entering into the laryngeal vestibule above the level of the vocal folds without retrieval leaving residue in the laryngeal vestibule. A liquid wash benefits to assist with partial pharyngeal clearance. There was No Gross Aspiration observed; However, patient at continuous risk from pharyngeal residue/stasis entering into the laryngeal vestibule without retrieval.     Of Note: Patient's shoulder girdle limited viewing of the trachea despite repositioning attempts. Patient presents with Mild Oral Stage and Moderate to Severe Pharyngeal Stage Dysphagia. The Oral Stage is characterized by adequate oral containment, slow bolus manipulation, slow tongue motion with slow anterior to posterior transfer of the bolus with adequate oral clearance post swallow.  The Pharyngeal Stage is characterized by delayed initiation of the pharyngeal swallow (bolus head is at the pyriforms for Nectar Thick Liquids), reduced laryngeal elevation, reduced/poor tongue base retraction resulting in moderate to severe vallecular residue post primary swallow; and reduced pharyngeal constriction resulting in pharyngeal wall coating.  There is moderate to severe pharyngeal clearance deficit located primarily in the vallecular space and pharyngeal wall coating post primary swallow for puree consistency.  There was Laryngeal Penetration during and after the swallow from pharyngeal residue/stasis entering into the laryngeal vestibule above the level of the vocal folds without retrieval leaving residue in the laryngeal vestibule. A liquid wash benefits to assist with partial pharyngeal clearance. There was No Aspiration observed; However, patient at continuous risk for laryngeal penetration from pharyngeal residue/stasis entering into the laryngeal vestibule without retrieval.     Of Note: Patient's shoulder girdle limited viewing of the trachea despite repositioning attempts.

## 2017-08-01 NOTE — PROGRESS NOTE ADULT - PROBLEM SELECTOR PLAN 4
- patient had worsening hypercapnia despite BiPAP in the setting of COPD and likely chronic aspiration w/ superimposed PNA  - RVP negative, BCx showing NGTD  - required mechanical ventilation, was extubated and now on 2L NC  - monitor O2 requirements, wean off NC as tolerated  - c/w vanc and zosyn (day 4), monitor vanc trough  - monitor VS, trend CBC, assess for signs/symptoms of infection from aspiration PNA complicated by underlying COPD  - monitor O2 requirements, wean off NC as tolerated  - c/w vanc and zosyn (day 4), monitor vanc trough  - monitor VS, trend CBC, assess for signs/symptoms of infection

## 2017-08-01 NOTE — PROGRESS NOTE ADULT - PROBLEM SELECTOR PLAN 3
Not in acute distress at this time.  No indication for systemic steroid at this time.  Symptomatic treatment with PRN B-agonist. Will clarify if patient is on inhaled steroid/long duration B-agonist at home.

## 2017-08-01 NOTE — CHART NOTE - NSCHARTNOTEFT_GEN_A_CORE
MAR Note    Called by night float intern for Afib with RVR up to 120s.  Patient was asymptomatic, however has a listed intolerance to beta-blockers.  EKG was done showing Afib with RVR.    Upon arrival, patient was noted to have heart rate increased to 140s, irregular.  Placed on Zoll, /110, planned to give diltiazem 5mg IV x 1.  Patient's heart rate decreased without intervention, then showed conduction abnormalities on the Zoll.  Repeat EKG obtained which showed Aflutter.  Patient remained asymptomatic with /110.    Patient transferred to telemetry.    Plan discussed with night float intern, will communicate to primary team.   HEDY present at bedside.       MD ROSA MARIA Olsen - Darin

## 2017-08-02 DIAGNOSIS — Z71.89 OTHER SPECIFIED COUNSELING: ICD-10-CM

## 2017-08-02 LAB
APTT BLD: 30.3 SEC — SIGNIFICANT CHANGE UP (ref 27.5–37.4)
BUN SERPL-MCNC: 32 MG/DL — HIGH (ref 7–23)
CALCIUM SERPL-MCNC: 8.7 MG/DL — SIGNIFICANT CHANGE UP (ref 8.4–10.5)
CHLORIDE SERPL-SCNC: 109 MMOL/L — HIGH (ref 98–107)
CO2 SERPL-SCNC: 16 MMOL/L — LOW (ref 22–31)
CREAT SERPL-MCNC: 1.5 MG/DL — HIGH (ref 0.5–1.3)
GLUCOSE SERPL-MCNC: 96 MG/DL — SIGNIFICANT CHANGE UP (ref 70–99)
HCT VFR BLD CALC: 50.1 % — HIGH (ref 34.5–45)
HGB BLD-MCNC: 14.3 G/DL — SIGNIFICANT CHANGE UP (ref 11.5–15.5)
INR BLD: 1.24 — HIGH (ref 0.88–1.17)
MAGNESIUM SERPL-MCNC: 2.3 MG/DL — SIGNIFICANT CHANGE UP (ref 1.6–2.6)
MCHC RBC-ENTMCNC: 26.1 PG — LOW (ref 27–34)
MCHC RBC-ENTMCNC: 28.5 % — LOW (ref 32–36)
MCV RBC AUTO: 91.6 FL — SIGNIFICANT CHANGE UP (ref 80–100)
NRBC # FLD: 0 — SIGNIFICANT CHANGE UP
PHOSPHATE SERPL-MCNC: 3.5 MG/DL — SIGNIFICANT CHANGE UP (ref 2.5–4.5)
PLATELET # BLD AUTO: 171 K/UL — SIGNIFICANT CHANGE UP (ref 150–400)
PMV BLD: 10.8 FL — SIGNIFICANT CHANGE UP (ref 7–13)
POTASSIUM SERPL-MCNC: 4.1 MMOL/L — SIGNIFICANT CHANGE UP (ref 3.5–5.3)
POTASSIUM SERPL-SCNC: 4.1 MMOL/L — SIGNIFICANT CHANGE UP (ref 3.5–5.3)
PROTHROM AB SERPL-ACNC: 13.9 SEC — HIGH (ref 9.8–13.1)
RBC # BLD: 5.47 M/UL — HIGH (ref 3.8–5.2)
RBC # FLD: 18.7 % — HIGH (ref 10.3–14.5)
SODIUM SERPL-SCNC: 144 MMOL/L — SIGNIFICANT CHANGE UP (ref 135–145)
VANCOMYCIN FLD-MCNC: 20.5 UG/ML — SIGNIFICANT CHANGE UP
WBC # BLD: 7.81 K/UL — SIGNIFICANT CHANGE UP (ref 3.8–10.5)
WBC # FLD AUTO: 7.81 K/UL — SIGNIFICANT CHANGE UP (ref 3.8–10.5)

## 2017-08-02 PROCEDURE — 70450 CT HEAD/BRAIN W/O DYE: CPT | Mod: 26

## 2017-08-02 PROCEDURE — 99233 SBSQ HOSP IP/OBS HIGH 50: CPT | Mod: GC

## 2017-08-02 PROCEDURE — 99222 1ST HOSP IP/OBS MODERATE 55: CPT | Mod: GC

## 2017-08-02 PROCEDURE — 99497 ADVNCD CARE PLAN 30 MIN: CPT | Mod: 25

## 2017-08-02 PROCEDURE — 93280 PM DEVICE PROGR EVAL DUAL: CPT | Mod: 26

## 2017-08-02 RX ORDER — IPRATROPIUM BROMIDE 0.2 MG/ML
500 SOLUTION, NON-ORAL INHALATION EVERY 6 HOURS
Qty: 0 | Refills: 0 | Status: DISCONTINUED | OUTPATIENT
Start: 2017-08-02 | End: 2017-08-03

## 2017-08-02 RX ORDER — SODIUM CHLORIDE 9 MG/ML
1000 INJECTION, SOLUTION INTRAVENOUS
Qty: 0 | Refills: 0 | Status: DISCONTINUED | OUTPATIENT
Start: 2017-08-02 | End: 2017-08-03

## 2017-08-02 RX ORDER — METOPROLOL TARTRATE 50 MG
10 TABLET ORAL EVERY 6 HOURS
Qty: 0 | Refills: 0 | Status: DISCONTINUED | OUTPATIENT
Start: 2017-08-02 | End: 2017-08-02

## 2017-08-02 RX ORDER — METOPROLOL TARTRATE 50 MG
10 TABLET ORAL EVERY 6 HOURS
Qty: 0 | Refills: 0 | Status: DISCONTINUED | OUTPATIENT
Start: 2017-08-02 | End: 2017-08-03

## 2017-08-02 RX ORDER — IPRATROPIUM/ALBUTEROL SULFATE 18-103MCG
3 AEROSOL WITH ADAPTER (GRAM) INHALATION EVERY 6 HOURS
Qty: 0 | Refills: 0 | Status: DISCONTINUED | OUTPATIENT
Start: 2017-08-02 | End: 2017-08-03

## 2017-08-02 RX ORDER — ACETAMINOPHEN 500 MG
650 TABLET ORAL ONCE
Qty: 0 | Refills: 0 | Status: COMPLETED | OUTPATIENT
Start: 2017-08-02 | End: 2017-08-02

## 2017-08-02 RX ADMIN — Medication 120 MILLIGRAM(S): at 12:47

## 2017-08-02 RX ADMIN — Medication 3 MILLILITER(S): at 03:42

## 2017-08-02 RX ADMIN — Medication 3 MILLILITER(S): at 23:42

## 2017-08-02 RX ADMIN — Medication 5 MILLIGRAM(S): at 05:28

## 2017-08-02 RX ADMIN — HEPARIN SODIUM 5000 UNIT(S): 5000 INJECTION INTRAVENOUS; SUBCUTANEOUS at 22:54

## 2017-08-02 RX ADMIN — SODIUM CHLORIDE 75 MILLILITER(S): 9 INJECTION, SOLUTION INTRAVENOUS at 18:15

## 2017-08-02 RX ADMIN — HEPARIN SODIUM 5000 UNIT(S): 5000 INJECTION INTRAVENOUS; SUBCUTANEOUS at 05:28

## 2017-08-02 RX ADMIN — Medication 650 MILLIGRAM(S): at 20:30

## 2017-08-02 RX ADMIN — Medication 650 MILLIGRAM(S): at 21:32

## 2017-08-02 RX ADMIN — HEPARIN SODIUM 5000 UNIT(S): 5000 INJECTION INTRAVENOUS; SUBCUTANEOUS at 13:54

## 2017-08-02 RX ADMIN — Medication 120 MILLIGRAM(S): at 18:15

## 2017-08-02 RX ADMIN — PIPERACILLIN AND TAZOBACTAM 25 GRAM(S): 4; .5 INJECTION, POWDER, LYOPHILIZED, FOR SOLUTION INTRAVENOUS at 01:17

## 2017-08-02 RX ADMIN — PIPERACILLIN AND TAZOBACTAM 25 GRAM(S): 4; .5 INJECTION, POWDER, LYOPHILIZED, FOR SOLUTION INTRAVENOUS at 13:54

## 2017-08-02 RX ADMIN — Medication 25 MICROGRAM(S): at 05:32

## 2017-08-02 RX ADMIN — Medication 5 MILLIGRAM(S): at 01:17

## 2017-08-02 NOTE — PROVIDER CONTACT NOTE (OTHER) - SITUATION
Blood pressure of 175/92.
Temperature of 95.1 F.
pt tachypneic @36 breaths per min
Heart rate and blood pressure elevated. Heart rhythm irregular upon auscultation.

## 2017-08-02 NOTE — PROGRESS NOTE ADULT - PROBLEM SELECTOR PLAN 3
Not in acute distress at this time.  No indication for systemic steroid at this time.  Symptomatic treatment with PRN B-agonist. Will clarify if patient is on inhaled steroid/long duration B-agonist at home. - maintenance fluids with NS @ 75 cc/hr  - daughter HCP agreeable for PEG  - plan for GI to place PEG tube  - NG tube to be placed for TF and meds in meanwhile; attempt to place NGT last night unsuccessful, will reattempt

## 2017-08-02 NOTE — PROGRESS NOTE ADULT - PROBLEM SELECTOR PLAN 9
Subq heparin  SCDs HCP and patient wants to discuss comfort/palliative care management.  Palliative care consult requrested. Discussed about goals of care with marquez by bedside for 30 minutes. HCP and patient wants to discuss comfort/palliative care management.  Palliative care consult requrested.

## 2017-08-02 NOTE — CHART NOTE - NSCHARTNOTEFT_GEN_A_CORE
ELECTROPHYSIOLOGY    Device Interrogation Performed                                  Date/Time:      08/02/2017   @ 2:30pm  :        [employer]                                Model:       Advisa DR   dual chamber pacemaker                        Mode:                  DDD                                     Rate:  50/120     Atrial Lead:  P wave amplitude:       1.4                   mv                                                       Currently admitted with pneumonia  Impedance                    532                   Ohms  Threshold                  not done                        V @                ms      Ventricular Lead: RV  R wave amplitude            8.5               mv  Impedance                     418                  Ohms  Threshold                    not done                     V @                 ms                                    Battery Status:                     Good                        Underlying Rhythm:      AFib/AFlutter with  bpm      Total  16.4%       Total AP  4%           Events/Observation:  Between 12/01/2016 and 05/30/2017 patient was in AFib 5.4% of the time. Between 06/01/2017 and 08/02/207 she has been in Afib 68% of the time. Ventricular rate while in AFib is 50-158bpm. She is not on Coumadin due to prior bleeding in the brain one year ago.      Impression/Plan:  Normal PPM / ICD function.   Normal sensing and pacing via iterative testing. Good battery status. Excellent threshold capture.  No reprogramming.

## 2017-08-02 NOTE — PROGRESS NOTE ADULT - PROBLEM SELECTOR PLAN 7
- patient with history of CKD III  - SCr stable, appears at baseline from prior hospitalizations  - avoid nephrotoxins, renally dose meds Unable to tolerate PO intake at this time.

## 2017-08-02 NOTE — PROGRESS NOTE ADULT - PROBLEM SELECTOR PROBLEM 4
Acute respiratory failure with hypercapnia Chronic obstructive pulmonary disease, unspecified COPD type

## 2017-08-02 NOTE — PROGRESS NOTE ADULT - SUBJECTIVE AND OBJECTIVE BOX
Patient is a 88y old  Female who presents with a chief complaint of resp failure (29 Jul 2017 01:08)      SUBJECTIVE / OVERNIGHT EVENTS:  Patient seen and examined at bedside. NGT placement attempted last night but was unsuccessful. Had 2 episodes of a-fib w/ RVR overnight, pt was asymptomatic. Resolved spontaneously. This AM, pt denies fever, chills, CP, SOB, palpitations, abdominal pain, and nausea.    MEDICATIONS  (STANDING):  piperacillin/tazobactam IVPB. 3.375 Gram(s) IV Intermittent every 12 hours  heparin  Injectable 5000 Unit(s) SubCutaneous every 8 hours  ALBUTerol/ipratropium for Nebulization 3 milliLiter(s) Nebulizer every 6 hours  levothyroxine Injectable 25 MICROGram(s) IV Push daily  sodium chloride 0.9%. 1000 milliLiter(s) (75 mL/Hr) IV Continuous <Continuous>  metoprolol Injectable 5 milliGRAM(s) IV Push every 6 hours    MEDICATIONS  (PRN):    Vital Signs Last 24 Hrs  T(C): 36.7 (02 Aug 2017 05:34), Max: 36.8 (01 Aug 2017 10:52)  T(F): 98.1 (02 Aug 2017 05:34), Max: 98.3 (01 Aug 2017 10:52)  HR: 130 (02 Aug 2017 05:34) (53 - 134)  BP: 155/95 (02 Aug 2017 05:34) (127/102 - 168/130)  BP(mean): --  RR: 19 (02 Aug 2017 05:34) (19 - 19)  SpO2: 97% (02 Aug 2017 05:34) (95% - 100%)  CAPILLARY BLOOD GLUCOSE        I&O's Summary      PHYSICAL EXAM:  GENERAL: NAD, well-developed  HEAD:  Atraumatic, Normocephalic  EYES: EOMI, conjunctiva and sclera clear  NECK: Supple  CHEST/LUNG: Clear to auscultation bilaterally; No wheeze; Poor inspiratory effort  HEART: Irregularly irregular; No murmurs, rubs, or gallops  ABDOMEN: Soft, Nontender, Nondistended; Bowel sounds present  EXTREMITIES:  No clubbing, cyanosis, or edema; pulses palpated bilaterally; UE and LE cool to touch bilaterally  NEUROLOGY: Alert, responding to questions  SKIN: No rashes or lesions    LABS:                        14.3   7.81  )-----------( 171      ( 02 Aug 2017 06:15 )             50.1     WBC Trend: 7.81<--, 8.45<--, 10.51<--  08-02    144  |  109<H>  |  32<H>  ----------------------------<  96  4.1   |  16<L>  |  1.50<H>    Ca    8.7      02 Aug 2017 06:15  Phos  3.5     08-02  Mg     2.3     08-02    TPro  6.4  /  Alb  3.0<L>  /  TBili  0.8  /  DBili  x   /  AST  19  /  ALT  14  /  AlkPhos  98  08-01    Creatinine Trend: 1.50<--, 1.49<--, 1.57<--, 1.54<--, 1.60<--, 1.57<--  PT/INR - ( 02 Aug 2017 06:30 )   PT: 13.9 SEC;   INR: 1.24          PTT - ( 02 Aug 2017 06:30 )  PTT:30.3 SEC    Vancomycin Level, Random (08.02.17 @ 06:15)    Vancomycin Level, Random: 20.5: Therapeutic ranges have not been established for random  vancomycin. Interpret results in context of patient's  clinical condition and time sample was drawn relative to  peak and trough therapeutic ranges. Therapeutic ranges for  peak vancomycin are 220.5: 5-50 and for trough vancomycin 10-20  with 15-20 mcg/mL used for complicated infections. ug/mL              RADIOLOGY & ADDITIONAL TESTS:    Imaging Personally Reviewed:  X-ray cinesophagram:  Laryngeal penetration during and after the swallow of nectar thick   liquids from pharyngeal residue/stasis entering into the very laryngeal   vestibule above the level of the vocal cords without retrieval.    For further information and recommendations, please refer to the speech   pathologist final report which is available for review in the electronic   medical record.     Consultant(s) Notes Reviewed:      Care Discussed with Consultants/Other Providers:    CONTACT #: [id number] Patient is a 88y old  Female who presents with a chief complaint of resp failure (29 Jul 2017 01:08)      SUBJECTIVE / OVERNIGHT EVENTS:  Patient seen and examined at bedside. Multiple NGT placement attempted last night and this AM but was unsuccessful. Had 2 episodes of a-fib w/ RVR overnight, pt was asymptomatic. Resolved spontaneously. This AM, pt denies fever, chills, CP, SOB, palpitations, abdominal pain, and nausea.  Granddaughter by the bedside - had concerns about discomfort with NG tube and PEG - wants to talk to HCP and patient about 'not having her suffer.'    Also had concerns about sluggish pupil response to L pupil. No other neurological deficits noted.    MEDICATIONS  (STANDING):  piperacillin/tazobactam IVPB. 3.375 Gram(s) IV Intermittent every 12 hours  heparin  Injectable 5000 Unit(s) SubCutaneous every 8 hours  ALBUTerol/ipratropium for Nebulization 3 milliLiter(s) Nebulizer every 6 hours  levothyroxine Injectable 25 MICROGram(s) IV Push daily  sodium chloride 0.9%. 1000 milliLiter(s) (75 mL/Hr) IV Continuous <Continuous>  metoprolol Injectable 5 milliGRAM(s) IV Push every 6 hours    MEDICATIONS  (PRN):    Vital Signs Last 24 Hrs  T(C): 36.7 (02 Aug 2017 05:34), Max: 36.8 (01 Aug 2017 10:52)  T(F): 98.1 (02 Aug 2017 05:34), Max: 98.3 (01 Aug 2017 10:52)  HR: 130 (02 Aug 2017 05:34) (53 - 134)  BP: 155/95 (02 Aug 2017 05:34) (127/102 - 168/130)  BP(mean): --  RR: 19 (02 Aug 2017 05:34) (19 - 19)  SpO2: 97% (02 Aug 2017 05:34) (95% - 100%)  CAPILLARY BLOOD GLUCOSE        I&O's Summary      PHYSICAL EXAM:  GENERAL: NAD, well-developed  HEAD:  Atraumatic, Normocephalic  EYES: EOMI, conjunctiva and sclera clear. Sluggish pupils.  NECK: Supple  CHEST/LUNG: Clear to auscultation bilaterally; No wheeze; Poor inspiratory effort  HEART: Irregularly irregular; No murmurs, rubs, or gallops  ABDOMEN: Soft, Nontender, Nondistended; Bowel sounds present  EXTREMITIES:  No clubbing, cyanosis, or edema; pulses palpated bilaterally; UE and LE cool to touch bilaterally  NEUROLOGY: Alert, responding to questions  SKIN: No rashes or lesions    LABS:                        14.3   7.81  )-----------( 171      ( 02 Aug 2017 06:15 )             50.1     WBC Trend: 7.81<--, 8.45<--, 10.51<--  08-02    144  |  109<H>  |  32<H>  ----------------------------<  96  4.1   |  16<L>  |  1.50<H>    Ca    8.7      02 Aug 2017 06:15  Phos  3.5     08-02  Mg     2.3     08-02    TPro  6.4  /  Alb  3.0<L>  /  TBili  0.8  /  DBili  x   /  AST  19  /  ALT  14  /  AlkPhos  98  08-01    Creatinine Trend: 1.50<--, 1.49<--, 1.57<--, 1.54<--, 1.60<--, 1.57<--  PT/INR - ( 02 Aug 2017 06:30 )   PT: 13.9 SEC;   INR: 1.24          PTT - ( 02 Aug 2017 06:30 )  PTT:30.3 SEC    Vancomycin Level, Random (08.02.17 @ 06:15)    Vancomycin Level, Random: 20.5: Therapeutic ranges have not been established for random  vancomycin. Interpret results in context of patient's  clinical condition and time sample was drawn relative to  peak and trough therapeutic ranges. Therapeutic ranges for  peak vancomycin are 220.5: 5-50 and for trough vancomycin 10-20  with 15-20 mcg/mL used for complicated infections. ug/mL              RADIOLOGY & ADDITIONAL TESTS:    Imaging Personally Reviewed:  X-ray cinesophagram:  Laryngeal penetration during and after the swallow of nectar thick   liquids from pharyngeal residue/stasis entering into the very laryngeal   vestibule above the level of the vocal cords without retrieval.    For further information and recommendations, please refer to the speech   pathologist final report which is available for review in the electronic   medical record.     < from: CT Head No Cont (08.02.17 @ 13:35) >  EXAM:  CT BRAIN        PROCEDURE DATE:  Aug  2 2017         INTERPRETATION:  HISTORY: Unequal pupils, dementia.    Technique: CT of the head was performed.    Multiple contiguous axial images were acquired from the skullbase to the   vertex withoutthe administration of intravenous contrast.  Coronal and   sagittal reformations were made.    COMPARISON: Prior head CT dated  7/29/2017.    FINDINGS:  Redemonstration of prominent ventricles and sulci with associated volume   loss which is unchanged from prior study. Redemonstration of   low-attenuation area in the right parietal cortex consistent with a   chronic infarct. There is extensive chronic microvascular disease   visualized. There is no intraparenchymal hematoma, mass effect or midline   shift. No abnormal extra-axial fluid collections are present.     The calvarium is intact. Air-fluid level in the left sphenoid sinus.     IMPRESSION:  1.  No evidence of acute hemorrhage, mass effect, or midline shift.  2.  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  3.  Air-fluid level in the left sphenoid sinus.                CLARITA VALLES M.D., RADIOLOGY RESIDENT  This document has been electronically signed.  PORTIA SHANNON M.D., ATTENDING RADIOLOGIST  This document has been electronically signed. Aug  2 2017  3:10PM    < end of copied text >    Consultant(s) Notes Reviewed:  GI    Care Discussed with Consultants/Other Providers:    CONTACT #: [id number]

## 2017-08-02 NOTE — PROGRESS NOTE ADULT - ASSESSMENT
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

## 2017-08-02 NOTE — CONSULT NOTE ADULT - SUBJECTIVE AND OBJECTIVE BOX
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  GI consulted for dysphagia and placement of Peg tube.       Allergies:  beta blockers (Hypotension)  latex (Flushing (Skin))  No Known Drug Allergies      Home Medications:    Hospital Medications:  piperacillin/tazobactam IVPB. 3.375 Gram(s) IV Intermittent every 12 hours  heparin  Injectable 5000 Unit(s) SubCutaneous every 8 hours  ALBUTerol/ipratropium for Nebulization 3 milliLiter(s) Nebulizer every 6 hours  levothyroxine Injectable 25 MICROGram(s) IV Push daily  sodium chloride 0.9%. 1000 milliLiter(s) IV Continuous <Continuous>  metoprolol Injectable 5 milliGRAM(s) IV Push every 6 hours      PMHX/PSHX:  Symptomatic bradycardia  Obese  Pruritus  Anxiety  Atrial fibrillation and flutter  HLD (hyperlipidemia)  CKD (chronic kidney disease)  High cholesterol  COPD (chronic obstructive pulmonary disease)  Arthritis  Benign essential HTN  Senile dementia with depression  Status post bilateral total hip replacement  Status post placement of implantable loop recorder  History of back surgery  H/O back injury  Hip problem      Family history:  No pertinent family history in first degree relatives  No significant family history      Social History:     ROS:     General:  No wt loss, fevers, chills, night sweats, fatigue,   Eyes:  Good vision, no reported pain  ENT:  No sore throat, pain, runny nose, dysphagia  CV:  No pain, palpitations, hypo/hypertension  Resp:  No dyspnea, cough, tachypnea, wheezing  GI:  See HPI  :  No pain, bleeding, incontinence, nocturia  Muscle:  No pain, weakness  Neuro:  No weakness, tingling, memory problems  Psych:  No fatigue, insomnia, mood problems, depression  Endocrine:  No polyuria, polydipsia, cold/heat intolerance  Heme:  No petechiae, ecchymosis, easy bruisability  Skin:  No rash, edema      PHYSICAL EXAM:     GENERAL:  Appears stated age, well-groomed, well-nourished, no distress  HEENT:  NC/AT,  conjunctivae clear and pink,  no JVD  CHEST:  Full & symmetric excursion, no increased effort, breath sounds clear  HEART:  Regular rhythm, S1, S2, no murmur/rub/S3/S4, no abdominal bruit, no edema  ABDOMEN:  Soft, non-tender, non-distended, normoactive bowel sounds,  no masses ,  EXTREMITIES:  no cyanosis,clubbing or edema  SKIN:  No rash/erythema/ecchymoses/petechiae/wounds/abscess/warm/dry  NEURO:  Alert, oriented    Vital Signs:  Vital Signs Last 24 Hrs  T(C): 36.7 (02 Aug 2017 05:34), Max: 36.8 (01 Aug 2017 10:52)  T(F): 98.1 (02 Aug 2017 05:34), Max: 98.3 (01 Aug 2017 10:52)  HR: 130 (02 Aug 2017 05:34) (53 - 134)  BP: 155/95 (02 Aug 2017 05:34) (127/102 - 168/130)  BP(mean): --  RR: 19 (02 Aug 2017 05:34) (19 - 19)  SpO2: 97% (02 Aug 2017 05:34) (95% - 100%)  Daily     Daily     LABS:                        14.3   7.81  )-----------( 171      ( 02 Aug 2017 06:15 )             50.1     08-02    144  |  109<H>  |  32<H>  ----------------------------<  96  4.1   |  16<L>  |  1.50<H>    Ca    8.7      02 Aug 2017 06:15  Phos  3.5     08-02  Mg     2.3     08-02    TPro  6.4  /  Alb  3.0<L>  /  TBili  0.8  /  DBili  x   /  AST  19  /  ALT  14  /  AlkPhos  98  08-01    LIVER FUNCTIONS - ( 01 Aug 2017 06:50 )  Alb: 3.0 g/dL / Pro: 6.4 g/dL / ALK PHOS: 98 u/L / ALT: 14 u/L / AST: 19 u/L / GGT: x           PT/INR - ( 02 Aug 2017 06:30 )   PT: 13.9 SEC;   INR: 1.24          PTT - ( 02 Aug 2017 06:30 )  PTT:30.3 SEC        Imaging:  MBS:   IMPRESSION:       Laryngeal penetration during and after the swallow of nectar thick   liquids from pharyngeal residue/stasis entering into the very laryngeal   vestibule above the level of the vocal cords without retrieval.    For further information and recommendations, please refer to the speech   pathologist final report which is available for review in the electronic   medical record. Chief Complaint:  Patient is a 88y old  Female who presents with a chief complaint of resp failure (29 Jul 2017 01:08)      HPI:  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), COPD, CKD III, recent admission in June for UGI bleed who presented with worsening SOB, intubated for hypercapnic respiratory failure in the setting aspiration PNA from dysphagia.  GI consulted for dysphagia and placement of Peg tube.   Denies abd pain, nausea, vomiting, diarrhea, constipation.     Allergies:  beta blockers (Hypotension)  latex (Flushing (Skin))  No Known Drug Allergies      Home Medications:    Hospital Medications:  piperacillin/tazobactam IVPB. 3.375 Gram(s) IV Intermittent every 12 hours  heparin  Injectable 5000 Unit(s) SubCutaneous every 8 hours  ALBUTerol/ipratropium for Nebulization 3 milliLiter(s) Nebulizer every 6 hours  levothyroxine Injectable 25 MICROGram(s) IV Push daily  sodium chloride 0.9%. 1000 milliLiter(s) IV Continuous <Continuous>  metoprolol Injectable 5 milliGRAM(s) IV Push every 6 hours      PMHX/PSHX:  Symptomatic bradycardia  Obese  Pruritus  Anxiety  Atrial fibrillation and flutter  HLD (hyperlipidemia)  CKD (chronic kidney disease)  High cholesterol  COPD (chronic obstructive pulmonary disease)  Arthritis  Benign essential HTN  Senile dementia with depression  Status post bilateral total hip replacement  Status post placement of implantable loop recorder  History of back surgery  H/O back injury  Hip problem      Family history:  No pertinent family history in first degree relatives  No significant family history      Social History:   Denies smoking, alcohol and drug use.     ROS:     General:  No wt loss, fevers, chills, night sweats, fatigue,   Eyes:  Good vision, no reported pain  ENT:  No sore throat, pain, runny nose, dysphagia  CV:  No pain, palpitations, hypo/hypertension  Resp:  +dyspnea, and tachypnea, No cough, wheezing  GI:  See HPI  :  No pain, bleeding, incontinence, nocturia  Muscle:  No pain, weakness  Neuro:  No weakness, tingling, memory problems  Psych:  No fatigue, insomnia, mood problems, depression  Endocrine:  No polyuria, polydipsia, cold/heat intolerance  Heme:  No petechiae, ecchymosis, easy bruisability  Skin:  No rash, edema      PHYSICAL EXAM:     GENERAL:  Appears stated age, well-groomed, well-nourished, no distress  HEENT:  NC/AT,  conjunctivae clear and pink,  no JVD  CHEST:  Full & symmetric excursion, no increased effort, breath sounds clear, on 2L NC  HEART:  Regular rhythm, S1, S2, no murmur/rub/S3/S4, no abdominal bruit, no edema  ABDOMEN:  Soft, non-tender, non-distended, normoactive bowel sounds,  no masses  EXTREMITIES:  no cyanosis,clubbing or edema  SKIN:  No rash/erythema/ecchymoses/petechiae/wounds/abscess/warm/dry  NEURO:  Alert, but not oriented to time place and person    Vital Signs:  Vital Signs Last 24 Hrs  T(C): 36.7 (02 Aug 2017 05:34), Max: 36.8 (01 Aug 2017 10:52)  T(F): 98.1 (02 Aug 2017 05:34), Max: 98.3 (01 Aug 2017 10:52)  HR: 130 (02 Aug 2017 05:34) (53 - 134)  BP: 155/95 (02 Aug 2017 05:34) (127/102 - 168/130)  BP(mean): --  RR: 19 (02 Aug 2017 05:34) (19 - 19)  SpO2: 97% (02 Aug 2017 05:34) (95% - 100%)  Daily     Daily     LABS:                        14.3   7.81  )-----------( 171      ( 02 Aug 2017 06:15 )             50.1     08-02    144  |  109<H>  |  32<H>  ----------------------------<  96  4.1   |  16<L>  |  1.50<H>    Ca    8.7      02 Aug 2017 06:15  Phos  3.5     08-02  Mg     2.3     08-02    TPro  6.4  /  Alb  3.0<L>  /  TBili  0.8  /  DBili  x   /  AST  19  /  ALT  14  /  AlkPhos  98  08-01    LIVER FUNCTIONS - ( 01 Aug 2017 06:50 )  Alb: 3.0 g/dL / Pro: 6.4 g/dL / ALK PHOS: 98 u/L / ALT: 14 u/L / AST: 19 u/L / GGT: x           PT/INR - ( 02 Aug 2017 06:30 )   PT: 13.9 SEC;   INR: 1.24          PTT - ( 02 Aug 2017 06:30 )  PTT:30.3 SEC        Imaging:  MBS:   IMPRESSION:     Laryngeal penetration during and after the swallow of nectar thick liquids from pharyngeal residue/stasis entering into the very laryngeal vestibule above the level of the vocal cords without retrieval.    For further information and recommendations, please refer to the speech pathologist final report which is available for review in the electronic medical record.

## 2017-08-02 NOTE — PROGRESS NOTE ADULT - PROBLEM SELECTOR PLAN 2
- maintenance fluids with NS @ 75 cc/hr  - daughter HCP agreeable for PEG  - plan for GI to place PEG tube, likely on 8/3  - NG tube to be placed for TF and meds in meanwhile; attempt to place NGT last night unsuccessful, will reattempt Concerned about acute CVA - CT head negative for new ICH.  Likely high risk due to Afib not on A/C due to ICH risk.  Will continue to monitor.

## 2017-08-02 NOTE — PROVIDER CONTACT NOTE (OTHER) - BACKGROUND
Patient admitted for shortness of breath.
pt admitted for SOB & hypercapnic respiratory failure
Patient admitted for shortness of breath.
Patient admitted for shortness of breath.

## 2017-08-02 NOTE — CONSULT NOTE ADULT - ATTENDING COMMENTS
Pt. seen and examined. Requested to insert PEG due to OP dysphagia as per recent s/s eval and abnl MBS with rec. for alternate means of non po nutrition. Pt. in NAD but appears somewhat tachypneic at rest on 2L nasal O2 with RR 22. Abdomen is soft NT/ND and without mass or HSmegly. Will discuss PEG with family and will review R/B/A. If stable from a respiratory standpoint for EGD and sedation will then schedule for PEG. In interim can consider keofeed tube insertion for nasoenteral feeds.
Well known to me. 88 year old woman with AF on no anticoagulation due to ICH , ppm for tachy-avi syndrome now in MICU and intubated for acute hypercarbic respiratory failure in setting of likely chronic aspiration.  -continue management per MICU team, patient currently on pressors so no AVN blockers at this time  -no need for further cardiac workup at this time, please call with questions

## 2017-08-02 NOTE — PROGRESS NOTE ADULT - PROBLEM SELECTOR PLAN 1
- patient with history of Afib not on AC due to history of ICH last year  - went into Afib w/ RVR x 2 overnight, c/w metoprolol 5 IV q6h for rate control  - consult EP regarding PPM interrogation  - will discuss about A/C depending on patients ambulation status - r/o fall risk  - AAOx1 at baseline per daughter, however given MS changes (lethargy), CT head obtained in ED which showed no acute intracranial process  - continue holding AC as risks outweigh benefits at this time - patient with history of Afib not on AC due to history of ICH last year  - increase lopressor to 10mg IV q6hr  - consult EP regarding PPM interrogation  - poor candidate for A/C due to recent ICH while on A/C.  - AAOx1 at baseline per daughter, however given MS changes (lethargy), CT head obtained in ED which showed no acute intracranial process  - continue holding AC as risks outweigh benefits at this time

## 2017-08-02 NOTE — PROGRESS NOTE ADULT - PROBLEM SELECTOR PLAN 5
- aspiration precaution  - likely chronically aspirating, aspiration PNA as cause of acute shortness of breath prior to admission  - c/w Zosyn, Vanco - aspiration precaution  - likely chronically aspirating, aspiration PNA as cause of acute shortness of breath prior to admission  - c/w Kristen Rosas (day 5)  - monitor O2 requirements, saturating well off NC  - monitor VS, trend CBC, assess for signs/symptoms of infection

## 2017-08-02 NOTE — PROGRESS NOTE ADULT - PROBLEM SELECTOR PLAN 4
from aspiration PNA complicated by underlying COPD  - monitor O2 requirements, saturating well off NC  - c/w vanc and zosyn (day 5), monitor vanc trough  - monitor VS, trend CBC, assess for signs/symptoms of infection Not in acute distress at this time.  No indication for systemic steroid at this time.  Symptomatic treatment with PRN B-agonist. Will clarify if patient is on inhaled steroid/long duration B-agonist at home.

## 2017-08-02 NOTE — PROVIDER CONTACT NOTE (OTHER) - REASON
tachypneic breathing
Blood pressure of 175/92.
Heart rate and blood pressure elevated. Heart rhythm irregular upon auscultation.
Temperature of 95.1 F.

## 2017-08-02 NOTE — PROVIDER CONTACT NOTE (OTHER) - ASSESSMENT
Blood pressure of 175/92.
Temperature of 95.1 F.
Heart rate and blood pressure elevated. Heart rhythm irregular upon auscultation.
labored breathing noted, accessory muscles used, RR 36/min on NC 2L, O2 sat: 97%, pt is  sleeping

## 2017-08-03 DIAGNOSIS — R41.82 ALTERED MENTAL STATUS, UNSPECIFIED: ICD-10-CM

## 2017-08-03 DIAGNOSIS — R53.2 FUNCTIONAL QUADRIPLEGIA: ICD-10-CM

## 2017-08-03 LAB
BACTERIA BLD CULT: SIGNIFICANT CHANGE UP
BASE EXCESS BLDA CALC-SCNC: -16.7 MMOL/L — SIGNIFICANT CHANGE UP
BASE EXCESS BLDA CALC-SCNC: SIGNIFICANT CHANGE UP MMOL/L
BUN SERPL-MCNC: 44 MG/DL — HIGH (ref 7–23)
CALCIUM SERPL-MCNC: 9.2 MG/DL — SIGNIFICANT CHANGE UP (ref 8.4–10.5)
CHLORIDE BLDA-SCNC: 116 MMOL/L — HIGH (ref 96–108)
CHLORIDE BLDA-SCNC: SIGNIFICANT CHANGE UP MMOL/L (ref 96–108)
CHLORIDE SERPL-SCNC: 108 MMOL/L — HIGH (ref 98–107)
CO2 SERPL-SCNC: 9 MMOL/L — CRITICAL LOW (ref 22–31)
CREAT BLDA-MCNC: 2.46 MG/DL — HIGH (ref 0.5–1.3)
CREAT BLDA-MCNC: SIGNIFICANT CHANGE UP MG/DL (ref 0.5–1.3)
CREAT SERPL-MCNC: 2.31 MG/DL — HIGH (ref 0.5–1.3)
GLUCOSE BLDA-MCNC: 99 MG/DL — SIGNIFICANT CHANGE UP (ref 70–99)
GLUCOSE BLDA-MCNC: SIGNIFICANT CHANGE UP MG/DL (ref 70–99)
GLUCOSE SERPL-MCNC: 104 MG/DL — HIGH (ref 70–99)
HCO3 BLDA-SCNC: 11 MMOL/L — LOW (ref 22–26)
HCO3 BLDA-SCNC: SIGNIFICANT CHANGE UP MMOL/L (ref 22–26)
HCT VFR BLD CALC: 46.7 % — HIGH (ref 34.5–45)
HCT VFR BLDA CALC: 40 % — SIGNIFICANT CHANGE UP (ref 34.5–46.5)
HCT VFR BLDA CALC: SIGNIFICANT CHANGE UP % (ref 34.5–46.5)
HGB BLD-MCNC: 13 G/DL — SIGNIFICANT CHANGE UP (ref 11.5–15.5)
HGB BLDA-MCNC: 13 G/DL — SIGNIFICANT CHANGE UP (ref 11.5–15.5)
HGB BLDA-MCNC: SIGNIFICANT CHANGE UP G/DL (ref 11.5–15.5)
LACTATE BLDA-SCNC: 9.4 MMOL/L — CRITICAL HIGH (ref 0.5–2)
LACTATE BLDA-SCNC: SIGNIFICANT CHANGE UP MMOL/L (ref 0.5–2)
MAGNESIUM SERPL-MCNC: 2.3 MG/DL — SIGNIFICANT CHANGE UP (ref 1.6–2.6)
MCHC RBC-ENTMCNC: 26.1 PG — LOW (ref 27–34)
MCHC RBC-ENTMCNC: 27.8 % — LOW (ref 32–36)
MCV RBC AUTO: 93.8 FL — SIGNIFICANT CHANGE UP (ref 80–100)
NRBC # FLD: 0.05 — SIGNIFICANT CHANGE UP
PCO2 BLDA: 40 MMHG — SIGNIFICANT CHANGE UP (ref 32–48)
PCO2 BLDA: SIGNIFICANT CHANGE UP MMHG (ref 32–48)
PH BLDA: 7.08 PH — CRITICAL LOW (ref 7.35–7.45)
PH BLDA: SIGNIFICANT CHANGE UP PH (ref 7.35–7.45)
PHOSPHATE SERPL-MCNC: 6.1 MG/DL — HIGH (ref 2.5–4.5)
PLATELET # BLD AUTO: 150 K/UL — SIGNIFICANT CHANGE UP (ref 150–400)
PMV BLD: 11.6 FL — SIGNIFICANT CHANGE UP (ref 7–13)
PO2 BLDA: 49 MMHG — CRITICAL LOW (ref 83–108)
PO2 BLDA: SIGNIFICANT CHANGE UP MMHG (ref 83–108)
POTASSIUM BLDA-SCNC: 4.5 MMOL/L — SIGNIFICANT CHANGE UP (ref 3.4–4.5)
POTASSIUM BLDA-SCNC: SIGNIFICANT CHANGE UP MMOL/L (ref 3.4–4.5)
POTASSIUM SERPL-MCNC: 4.6 MMOL/L — SIGNIFICANT CHANGE UP (ref 3.5–5.3)
POTASSIUM SERPL-SCNC: 4.6 MMOL/L — SIGNIFICANT CHANGE UP (ref 3.5–5.3)
RBC # BLD: 4.98 M/UL — SIGNIFICANT CHANGE UP (ref 3.8–5.2)
RBC # FLD: 18.6 % — HIGH (ref 10.3–14.5)
SAO2 % BLDA: 63.2 % — LOW (ref 95–99)
SAO2 % BLDA: SIGNIFICANT CHANGE UP % (ref 95–99)
SODIUM BLDA-SCNC: 143 MMOL/L — SIGNIFICANT CHANGE UP (ref 136–146)
SODIUM BLDA-SCNC: SIGNIFICANT CHANGE UP MMOL/L (ref 136–146)
SODIUM SERPL-SCNC: 146 MMOL/L — HIGH (ref 135–145)
VANCOMYCIN TROUGH SERPL-MCNC: 19.5 UG/ML — SIGNIFICANT CHANGE UP (ref 10–20)
WBC # BLD: 22.66 K/UL — HIGH (ref 3.8–10.5)
WBC # FLD AUTO: 22.66 K/UL — HIGH (ref 3.8–10.5)

## 2017-08-03 PROCEDURE — 93280 PM DEVICE PROGR EVAL DUAL: CPT | Mod: 26

## 2017-08-03 PROCEDURE — 99291 CRITICAL CARE FIRST HOUR: CPT | Mod: 25

## 2017-08-03 PROCEDURE — 36556 INSERT NON-TUNNEL CV CATH: CPT | Mod: GC

## 2017-08-03 PROCEDURE — 99223 1ST HOSP IP/OBS HIGH 75: CPT | Mod: GC

## 2017-08-03 PROCEDURE — 99232 SBSQ HOSP IP/OBS MODERATE 35: CPT | Mod: GC

## 2017-08-03 PROCEDURE — 71010: CPT | Mod: 26

## 2017-08-03 RX ORDER — NITROGLYCERIN 6.5 MG
1 CAPSULE, EXTENDED RELEASE ORAL ONCE
Qty: 0 | Refills: 0 | Status: COMPLETED | OUTPATIENT
Start: 2017-08-03 | End: 2017-08-03

## 2017-08-03 RX ORDER — NOREPINEPHRINE BITARTRATE/D5W 8 MG/250ML
1 PLASTIC BAG, INJECTION (ML) INTRAVENOUS
Qty: 32 | Refills: 0 | Status: DISCONTINUED | OUTPATIENT
Start: 2017-08-03 | End: 2017-08-03

## 2017-08-03 RX ORDER — PHENYLEPHRINE HYDROCHLORIDE 10 MG/ML
0.4 INJECTION INTRAVENOUS
Qty: 160 | Refills: 0 | Status: DISCONTINUED | OUTPATIENT
Start: 2017-08-03 | End: 2017-08-03

## 2017-08-03 RX ORDER — PROPOFOL 10 MG/ML
50 INJECTION, EMULSION INTRAVENOUS
Qty: 1000 | Refills: 0 | Status: DISCONTINUED | OUTPATIENT
Start: 2017-08-03 | End: 2017-08-03

## 2017-08-03 RX ORDER — SODIUM CHLORIDE 9 MG/ML
1000 INJECTION INTRAMUSCULAR; INTRAVENOUS; SUBCUTANEOUS ONCE
Qty: 0 | Refills: 0 | Status: COMPLETED | OUTPATIENT
Start: 2017-08-03 | End: 2017-08-03

## 2017-08-03 RX ADMIN — Medication 1 INCH(S): at 09:30

## 2017-08-03 RX ADMIN — Medication 62.91 MICROGRAM(S)/KG/MIN: at 14:01

## 2017-08-03 RX ADMIN — PIPERACILLIN AND TAZOBACTAM 25 GRAM(S): 4; .5 INJECTION, POWDER, LYOPHILIZED, FOR SOLUTION INTRAVENOUS at 01:54

## 2017-08-03 RX ADMIN — PROPOFOL 20.13 MICROGRAM(S)/KG/MIN: 10 INJECTION, EMULSION INTRAVENOUS at 14:01

## 2017-08-03 RX ADMIN — HEPARIN SODIUM 5000 UNIT(S): 5000 INJECTION INTRAVENOUS; SUBCUTANEOUS at 07:03

## 2017-08-03 RX ADMIN — Medication 5 MILLIGRAM(S): at 09:30

## 2017-08-03 RX ADMIN — PHENYLEPHRINE HYDROCHLORIDE 5.03 MICROGRAM(S)/KG/MIN: 10 INJECTION INTRAVENOUS at 11:34

## 2017-08-03 RX ADMIN — Medication 120 MILLIGRAM(S): at 00:11

## 2017-08-03 RX ADMIN — SODIUM CHLORIDE 4000 MILLILITER(S): 9 INJECTION INTRAMUSCULAR; INTRAVENOUS; SUBCUTANEOUS at 10:44

## 2017-08-03 NOTE — DISCHARGE NOTE FOR THE EXPIRED PATIENT - HOSPITAL COURSE
89 yo F w/PMH of dementia, b/l hip replacements, Afib (not on AC secondary to previous ICH), symptomatic bradycardia (pacemaker implanted 2016), COPD, CKD III, recent admission in  for UGI bleed who presented with worsening SOB per daughter. Per the daughter, the patient had been having progressively worsening SOB, so EMS called. Patient with SOB with exertion at baseline per daughter. She is a former smoker, having quit "many years ago." Unclear history of COPD, patient not on O2 or BiPAP at home.     In the ED, VBG revealed a pH of 7.29, pCO2 of 53. Pro-Bnp [id number]. Lactate 2.9. CXR showing reticular opacities in bilateral lung bases, unchanged from prior, may represent pulmonary fibrosis. Given DuoNebs x 2, Xopenex x 1, Solumedrol 125 x 1 and 2g MgSO4. B-lines noted on POCUS per ED, given 40mg IVP of Lasix. Patient started on BiPAP on arrival to ED, repeat VBG showed worsening acidosis with pH 7.27 and pCO2 of 58. Patient with worsening lethargy, intubated for hypercapnic respiratory failure likely due to COPD, possible aspiration pneumonia.     Patient was intubated and transferred to the MICU. CT head showed No acute intracranial hemorrhage, mass effect, or CT evidence of a large acute of transcortical infarct. POCUS revealed bilateral LL consolidations, bilateral lateral and anterior B-lines. Trace R plef. on Echo septal hypokinesis, AR, MR, TR. LVH/RVH and pulmonary HTN. UA was positive, urine culture positive for >100,000 gram positive rods. Patient was started on vancomycin and zosyn for PNA and UTI. Pt passed CPAP trials on  and was extubated. Pt was transferred to the floor on NC. While on the floor pt was hypothermic, with RRT called for Afib with RVR up to 120-140s, which resolved w/o intervention. Pt had been receiving TF via NGT, which was pulled out by patient, with multiple attempts at reinsertion unsuccessfully, with patient now refusing. GI was consulted for PEG tube placement, despite concerns by family about GOC. Plan for PEG Fri/Mon per note. This AM pt was found unresponsive on AM rounds, did not wake to sternal rub. RRT called. GOC Discussions with family this AM w/ pt still FULL CODE with instructions to intubate. Pt intubated for airway protection. Pt hypotensive to 50s/teens, started on norepi.  Etiology of event not immediately clear, perhaps aspiration event. Pt was transported to the MICU. Pt continued to be hypotensive despite max dose norepinepherine. Emergent central line was placed in the [facility], confirmed via CXR. Pt's pressors were changed to phenylepherine from norepinepherine 2/2 persistent tachycardia. Extensive goals of care discussions were held with the family, and the decision was made to make the patient DNR and to cap pressors. The patient subsequently became increasingly bradycardic, and finally went into PEA arrest followed by asystole. At this point the family asked that the ETT be removed and mechanical ventilator turned off.    Patient had no spontaneous respirations, heart sounds, response to any stimulus including noxious stimuli. Patient's pupil were fixed and dilated at 8mm and with no response to light, no corneal reflexes, no gag reflex, and no oculocephalic reflex. Patient was pronounced at 14:17. Patient's family was contacted and did not desire autopsy,  services were offered, and  arrangements were discussed.    Juan Villarreal MD  [facility] PGY-2  [phone]

## 2017-08-03 NOTE — ED PROCEDURE NOTE - CPROC ED POST RADIOGRAPHY1
central line located in the superior vena cava/post-procedure radiography performed/central line located in the

## 2017-08-03 NOTE — PROGRESS NOTE ADULT - PROBLEM SELECTOR PLAN 3
Concerned about acute CVA - CT head negative for new ICH.  Likely high risk due to Afib not on A/C due to ICH risk.  Will continue to monitor.

## 2017-08-03 NOTE — PROGRESS NOTE ADULT - SUBJECTIVE AND OBJECTIVE BOX
Patient is a 88y old  Female who presents with a chief complaint of resp failure (03 Aug 2017 14:25)      SUBJECTIVE / OVERNIGHT EVENTS:  Patient seen and examined at bedside. Pt was in pain, tylenol 650 suppository was given. Pt was tachypneic to 36 w/ labored breathing and use of accessory muscles, O2 sat was 97%, no interventions done. This AM, pt was found to be breathing heavily, using accessory muscles of respirations, and was unresponsive to sternal rub, pain, or cold water. RRT was called. CXR, CBC, CMP, comprehensive ABG, and Farnaz were ordered stat. BP was initially in the 110s. Pt's daughter was called and was compliant w/ intubation. Pt was intubated for airway protection. IO access was obtained. NS and propofol were given, levophed was hung but not started. BP dropped to 50s/teens so levophed was started. MICU was consulted and pt was transferred to MICU.    MEDICATIONS  (STANDING):  heparin  Injectable 5000 Unit(s) SubCutaneous every 8 hours  levothyroxine Injectable 25 MICROGram(s) IV Push daily  phentolamine Injectable 5 milliGRAM(s) SubCutaneous once  phenylephrine    Infusion 0.4 MICROgram(s)/kG/Min (5.033 mL/Hr) IV Continuous <Continuous>  norepinephrine Infusion 1 MICROgram(s)/kG/Min (62.906 mL/Hr) IV Continuous <Continuous>  propofol Infusion 50 MICROgram(s)/kG/Min (20.13 mL/Hr) IV Continuous <Continuous>  nitroglycerin    2% Ointment 1 Inch(s) Transdermal once    MEDICATIONS  (PRN):      CAPILLARY BLOOD GLUCOSE      Vital Signs Last 24 Hrs  T(C): 35.6 (03 Aug 2017 12:00), Max: 36.7 (02 Aug 2017 20:25)  T(F): 96 (03 Aug 2017 12:00), Max: 98.1 (02 Aug 2017 20:25)  HR: 0 (03 Aug 2017 14:17) (0 - 151)  BP: 75/45 (03 Aug 2017 13:00) (38/19 - 163/128)  BP(mean): 53 (03 Aug 2017 13:00) (14 - 138)  RR: 0 (03 Aug 2017 14:17) (0 - 38)  SpO2: 74% (03 Aug 2017 12:00) (74% - 100%)  I&O's Summary    03 Aug 2017 07:01  -  03 Aug 2017 15:07  --------------------------------------------------------  IN: 1549 mL / OUT: 5 mL / NET: 1544 mL        PHYSICAL EXAM:  GENERAL: Breathing labored, unresponsive to sternal rub  HEAD:  Atraumatic, Normocephalic  EYES: EOMI, pupils dilated and sluggish, L more pronounced than R  NECK: Supple  CHEST/LUNG: Using accessory muscles of respirations; Clear to auscultation bilaterally; No wheeze  HEART: Irregularly irregular; No murmurs, rubs, or gallops  ABDOMEN: Soft, Nontender, Nondistended; Bowel sounds present  EXTREMITIES:  No clubbing, cyanosis, or edema; Extremities cool to touch; Pulses thready  NEUROLOGY: Unresponsive to sternal rub  SKIN: No rashes or lesions    LABS:                        13.0   22.66 )-----------( 150      ( 03 Aug 2017 06:00 )             46.7     WBC Trend: 22.66<--, 7.81<--, 8.45<--  08-03    146<H>  |  108<H>  |  44<H>  ----------------------------<  104<H>  4.6   |  9<LL>  |  2.31<H>    Ca    9.2      03 Aug 2017 06:00  Phos  6.1     08-03  Mg     2.3     08-03      Creatinine Trend: 2.31<--, 1.50<--, 1.49<--, 1.57<--, 1.54<--, 1.60<--  PT/INR - ( 02 Aug 2017 06:30 )   PT: 13.9 SEC;   INR: 1.24          PTT - ( 02 Aug 2017 06:30 )  PTT:30.3 SEC            RADIOLOGY & ADDITIONAL TESTS:    Imaging Personally Reviewed:  CXR 8/3/17 at 8:07:  Reticular opacities at the right lung base, similar to prior exam.   Poor definition of part of right hemidiaphragm which may be due to   subsegmental atelectasis and/or small pleural effusion.  Enteric tube terminates at the level of the GE junction with its   side-port in the distal esophagus. Recommend the tube be advanced.    Consultant(s) Notes Reviewed:      Care Discussed with Consultants/Other Providers: MICU consulted, pt transferred to MICU    CONTACT #: [id number]

## 2017-08-03 NOTE — PROGRESS NOTE ADULT - PROBLEM SELECTOR PLAN 1
Patient was found unresponsive to sternal rub this AM w/ labored breathing  - ordered CXR, CBC, CMP, comprehensive ABG, and Farnaz  - obtained IO access  - gave NS and propofol  - spoke w/ pt's daughter who complied w/ having pt intubated, pt intubated for airway protection  - levophed started in light of decreasing BP  - MICU consulted, pt transferred to MICU

## 2017-08-03 NOTE — PROGRESS NOTE ADULT - ASSESSMENT
IMPRESSION:  -Dysphagia to all liquids, nectar thick and solids as assessed by speech and swallow and evidenced by MBS; this has been chronic (atleast 6 months) and complicated by aspiration, plan for alternate means of feeding, family interested in peg tube, refusing NG tube  -Hypercapnic respiratory failure in the setting aspiration PNA s/p intubation, now on 2L NC, dyspneic     PLAN:   -continue to monitor resp status, need to wean off oxygen  -plan for peg tube placement either Fri/Monday (currently pt is tachypneic, dyspneic and hypoxic, thus will hold off)  -spoke to daughter ([phone]) who is in agreement with placement of peg tube  -please get CBC, coags on Fri    GI team will continue to monitor  Thank you for the consult. IMPRESSION:  -Dysphagia to all liquids, nectar thick and solids as assessed by speech and swallow and evidenced by MBS; this has been chronic (atleast 6 months) and complicated by aspiration, plan for alternate means of feeding, family interested in peg tube, refusing NG tube  -Hypercapnic respiratory failure in the setting aspiration PNA s/p intubation *2    PLAN:   -medical management as per ICU team   -plan for peg tube placement next week based on patient symptoms     GI team will continue to monitor  Thank you for the consult.

## 2017-08-03 NOTE — ED PROCEDURE NOTE - CPROC ED INFUS LINE DETAIL1
The guidewire was recovered./The location was identified, and the area was draped and prepped./Ultrasound guidance was used during placement./The catheter was placed using sterile technique./All lumen(s) aspirated and flushed without difficulty.

## 2017-08-03 NOTE — PROGRESS NOTE ADULT - SUBJECTIVE AND OBJECTIVE BOX
Chief Complaint:  Patient is a 88y old  Female who presents with a chief complaint of resp failure (29 Jul 2017 01:08)      Interval Events:     Allergies:  beta blockers (Hypotension)  latex (Flushing (Skin))  No Known Drug Allergies      Hospital Medications:  piperacillin/tazobactam IVPB. 3.375 Gram(s) IV Intermittent every 12 hours  heparin  Injectable 5000 Unit(s) SubCutaneous every 8 hours  levothyroxine Injectable 25 MICROGram(s) IV Push daily  ALBUTerol/ipratropium for Nebulization 3 milliLiter(s) Nebulizer every 6 hours PRN  ipratropium    for Nebulization 500 MICROGram(s) Nebulizer every 6 hours PRN  metoprolol IVPB 10 milliGRAM(s) IV Intermittent every 6 hours  dextrose 5% + sodium chloride 0.9%. 1000 milliLiter(s) IV Continuous <Continuous>      PMHX/PSHX:  Symptomatic bradycardia  Obese  Pruritus  Anxiety  Atrial fibrillation and flutter  HLD (hyperlipidemia)  CKD (chronic kidney disease)  High cholesterol  COPD (chronic obstructive pulmonary disease)  Arthritis  Benign essential HTN  Senile dementia with depression  Status post bilateral total hip replacement  Status post placement of implantable loop recorder  History of back surgery  H/O back injury  Hip problem      Family history:  No pertinent family history in first degree relatives  No significant family history      ROS:     General:  No wt loss, fevers, chills, night sweats, fatigue,   Eyes:  Good vision, no reported pain  ENT:  No sore throat, pain, runny nose, dysphagia  CV:  No pain, palpitations, hypo/hypertension  Resp:  No dyspnea, cough, tachypnea, wheezing  GI:  See HPI  :  No pain, bleeding, incontinence, nocturia  Muscle:  No pain, weakness  Neuro:  No weakness, tingling, memory problems  Psych:  No fatigue, insomnia, mood problems, depression  Endocrine:  No polyuria, polydipsia, cold/heat intolerance  Heme:  No petechiae, ecchymosis, easy bruisability  Skin:  No rash, edema      PHYSICAL EXAM:     GENERAL:  Appears stated age, well-groomed, well-nourished, no distress  HEENT:  NC/AT,  conjunctivae clear, sclera -anicteric  CHEST:  Full & symmetric excursion, no increased effort, breath sounds clear  HEART:  Regular rhythm, S1, S2, no murmur/rub/S3/S4,  no edema  ABDOMEN:  Soft, non-tender, non-distended, normoactive bowel sounds,  no masses ,no hepato-splenomegaly,   EXTREMITIES:  no cyanosis,clubbing or edema  SKIN:  No rash/erythema/ecchymoses/petechiae/wounds/abscess/warm/dry  NEURO:  Alert, oriented    Vital Signs:  Vital Signs Last 24 Hrs  T(C): 36.7 (02 Aug 2017 20:25), Max: 36.7 (02 Aug 2017 20:25)  T(F): 98.1 (02 Aug 2017 20:25), Max: 98.1 (02 Aug 2017 20:25)  HR: 92 (03 Aug 2017 00:06) (92 - 144)  BP: 157/96 (03 Aug 2017 00:06) (124/82 - 157/96)  BP(mean): --  RR: 25 (03 Aug 2017 00:06) (18 - 25)  SpO2: 97% (03 Aug 2017 00:06) (96% - 97%)  Daily     Daily     LABS:                        13.0   x     )-----------( 150      ( 03 Aug 2017 06:00 )             46.7     08-02    144  |  109<H>  |  32<H>  ----------------------------<  96  4.1   |  16<L>  |  1.50<H>    Ca    8.7      02 Aug 2017 06:15  Phos  3.5     08-02  Mg     2.3     08-02        PT/INR - ( 02 Aug 2017 06:30 )   PT: 13.9 SEC;   INR: 1.24          PTT - ( 02 Aug 2017 06:30 )  PTT:30.3 SEC        Imaging: Chief Complaint:  Patient is a 88y old  Female who presents with a chief complaint of resp failure (29 Jul 2017 01:08)      Interval Events:   Patient seen and examined. She as tachypneic overnight and this am she was found to be unresponsive. She was transfered to the MICU for further care.     Allergies:  beta blockers (Hypotension)  latex (Flushing (Skin))  No Known Drug Allergies      Hospital Medications:  piperacillin/tazobactam IVPB. 3.375 Gram(s) IV Intermittent every 12 hours  heparin  Injectable 5000 Unit(s) SubCutaneous every 8 hours  levothyroxine Injectable 25 MICROGram(s) IV Push daily  ALBUTerol/ipratropium for Nebulization 3 milliLiter(s) Nebulizer every 6 hours PRN  ipratropium    for Nebulization 500 MICROGram(s) Nebulizer every 6 hours PRN  metoprolol IVPB 10 milliGRAM(s) IV Intermittent every 6 hours  dextrose 5% + sodium chloride 0.9%. 1000 milliLiter(s) IV Continuous <Continuous>      PMHX/PSHX:  Symptomatic bradycardia  Obese  Pruritus  Anxiety  Atrial fibrillation and flutter  HLD (hyperlipidemia)  CKD (chronic kidney disease)  High cholesterol  COPD (chronic obstructive pulmonary disease)  Arthritis  Benign essential HTN  Senile dementia with depression  Status post bilateral total hip replacement  Status post placement of implantable loop recorder  History of back surgery  H/O back injury  Hip problem      Family history:  No pertinent family history in first degree relatives  No significant family history      ROS:     General:  No wt loss, fevers, chills, night sweats, fatigue,   Eyes:  Good vision, no reported pain  ENT:  No sore throat, pain, runny nose, dysphagia  CV:  No pain, palpitations, hypo/hypertension  Resp:  No dyspnea, cough, tachypnea, wheezing  GI:  See HPI  :  No pain, bleeding, incontinence, nocturia  Muscle:  No pain, weakness  Neuro:  No weakness, tingling, memory problems  Psych:  No fatigue, insomnia, mood problems, depression  Endocrine:  No polyuria, polydipsia, cold/heat intolerance  Heme:  No petechiae, ecchymosis, easy bruisability  Skin:  No rash, edema      PHYSICAL EXAM:     GENERAL:  Appears stated age, well-groomed, well-nourished, no distress  HEENT:  NC/AT,  conjunctivae clear, sclera -anicteric  CHEST:  Full & symmetric excursion, no increased effort, breath sounds clear  HEART:  Regular rhythm, S1, S2, no murmur/rub/S3/S4,  no edema  ABDOMEN:  Soft, non-tender, non-distended, normoactive bowel sounds,  no masses ,no hepato-splenomegaly,   EXTREMITIES:  no cyanosis,clubbing or edema  SKIN:  No rash/erythema/ecchymoses/petechiae/wounds/abscess/warm/dry  NEURO:  Alert, oriented    Vital Signs:  Vital Signs Last 24 Hrs  T(C): 36.7 (02 Aug 2017 20:25), Max: 36.7 (02 Aug 2017 20:25)  T(F): 98.1 (02 Aug 2017 20:25), Max: 98.1 (02 Aug 2017 20:25)  HR: 92 (03 Aug 2017 00:06) (92 - 144)  BP: 157/96 (03 Aug 2017 00:06) (124/82 - 157/96)  BP(mean): --  RR: 25 (03 Aug 2017 00:06) (18 - 25)  SpO2: 97% (03 Aug 2017 00:06) (96% - 97%)  Daily     Daily     LABS:                        13.0   x     )-----------( 150      ( 03 Aug 2017 06:00 )             46.7     08-02    144  |  109<H>  |  32<H>  ----------------------------<  96  4.1   |  16<L>  |  1.50<H>    Ca    8.7      02 Aug 2017 06:15  Phos  3.5     08-02  Mg     2.3     08-02        PT/INR - ( 02 Aug 2017 06:30 )   PT: 13.9 SEC;   INR: 1.24          PTT - ( 02 Aug 2017 06:30 )  PTT:30.3 SEC        Imaging:

## 2017-08-03 NOTE — PROGRESS NOTE ADULT - PROBLEM SELECTOR PROBLEM 1
Acute respiratory failure with hypercapnia
Acute respiratory failure with hypercapnia
Atrial fibrillation
Atrial fibrillation
Altered mental status

## 2017-08-03 NOTE — PROGRESS NOTE ADULT - PROBLEM SELECTOR PLAN 6
- aspiration precaution  - likely chronically aspirating, aspiration PNA as cause of acute shortness of breath prior to admission  - c/w Kristen Rosas (day 5)  - monitor O2 requirements, saturating well off NC  - monitor VS, trend CBC, assess for signs/symptoms of infection

## 2017-08-03 NOTE — CHART NOTE - NSCHARTNOTEFT_GEN_A_CORE
Called daughter to inform her of MICU admission and informed of poor prognosis, increasing pressor requirements. Advised daughter to come to hospital as soon as possible. Daughter understands the gravity of the situation and will be coming.

## 2017-08-03 NOTE — CONSULT NOTE ADULT - SUBJECTIVE AND OBJECTIVE BOX
MICU ACCEPT NOTE    CHIEF COMPLAINT: Respiratory Distress    HPI:    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), COPD, CKD III, recent admission in June for UGI bleed who presented with worsening SOB per daughter. Per the daughter, the patient had been having progressively worsening SOB, so EMS called. Patient with SOB with exertion at baseline per daughter. She is a former smoker, having quit "many years ago." Unclear history of COPD, patient not on O2 or BiPAP at home.     In the ED, VBG revealed a pH of 7.29, pCO2 of 53. Pro-Bnp 97280. Lactate 2.9. CXR showing reticular opacities in bilateral lung bases, unchanged from prior, may represent pulmonary fibrosis. Given DuoNebs x 2, Xopenex x 1, Solumedrol 125 x 1 and 2g MgSO4. B-lines noted on POCUS per ED, given 40mg IVP of Lasix. Patient started on BiPAP on arrival to ED, repeat VBG showed worsening acidosis with pH 7.27 and pCO2 of 58. Patient with worsening lethargy, intubated for hypercapnic respiratory failure likely due to COPD, possible aspiration pneumonia.     Patient was intubated and transferred to the MICU. CT head showed No acute intracranial hemorrhage, mass effect, or CT evidence of a large acute of transcortical infarct. POCUS revealed bilateral LL consolidations, bilateral lateral and anterior B-lines. Trace R plef. on Echo septal hypokinesis, AR, MR, TR. LVH/RVH and pulmonary HTN. UA was positive, urine culture positive for >100,000 gram positive rods. Patient was started on vancomycin and zosyn for PNA and UTI. Pt passed CPAP trials on 7/30 and was extubated. Pt was transferred to the floor on NC. While on the floor pt was hypothermic, with RRT called for Afib with RVR up to 120-140s, which resolved w/o intervention. Pt had been receiving TF via NGT, which was pulled out by patient, with multiple attempts at reinsertion unsuccessfully, with patient now refusing. GI was consulted for PEG tube placement, despite concerns by family about GOC. Plan for PEG Fri/Mon per note. This AM pt developed worsening respiratory status, and was intubated for hypoxic respiratory failure.    PAST MEDICAL & SURGICAL HISTORY:  Symptomatic bradycardia  Obese  Pruritus  Anxiety  Atrial fibrillation and flutter  HLD (hyperlipidemia)  CKD (chronic kidney disease)  COPD (chronic obstructive pulmonary disease)  Arthritis  Benign essential HTN  Senile dementia with depression  Status post bilateral total hip replacement: several years ago  Status post placement of implantable loop recorder: 9/15  History of back surgery: x3, approimately 1980, 1985, 1991      FAMILY HISTORY:  No pertinent family history in first degree relatives      SOCIAL HISTORY:  Smoking: __ packs x ___ years  EtOH Use:  Marital Status:  Occupation:  Recent Travel:  Country of Birth:  Advance Directives:    Allergies    latex (Flushing (Skin))  No Known Drug Allergies    Intolerances    beta blockers (Hypotension)      HOME MEDICATIONS:    REVIEW OF SYSTEMS:  CONSTITUTIONAL: No weakness, fevers or chills  EYES/ENT: No visual changes;  No vertigo or throat pain   NECK: No pain or stiffness  RESPIRATORY: No cough, wheezing, hemoptysis; No shortness of breath  CARDIOVASCULAR: No chest pain or palpitations  GASTROINTESTINAL: No abdominal or epigastric pain. No nausea, vomiting, or hematemesis; No diarrhea or constipation. No melena or hematochezia.  GENITOURINARY: No dysuria, frequency or hematuria  NEUROLOGICAL: No numbness or weakness  SKIN: No itching, burning, rashes, or lesions   All other review of systems is negative unless indicated above.  [ ] All other systems negative  [x ] Unable to assess ROS because Intubated/sedated     OBJECTIVE:  ICU Vital Signs Last 24 Hrs  T(C): 36.7 (02 Aug 2017 20:25), Max: 36.7 (02 Aug 2017 20:25)  T(F): 98.1 (02 Aug 2017 20:25), Max: 98.1 (02 Aug 2017 20:25)  HR: 92 (03 Aug 2017 00:06) (92 - 144)  BP: 157/96 (03 Aug 2017 00:06) (124/82 - 157/96)  RR: 25 (03 Aug 2017 00:06) (18 - 25)  SpO2: 97% (03 Aug 2017 00:06) (96% - 97%)        CAPILLARY BLOOD GLUCOSE          PHYSICAL EXAM:  General: WN/WD NAD  HEENT: PERRLA, EOMI, moist mucous membranes  Neurology: A&Ox3, nonfocal, PARHAM x 4  Respiratory: CTA B/L, normal respiratory effort, no wheezes, crackles, rales  CV: RRR, S1S2, no murmurs, rubs or gallops  Abdominal: Soft, NT, ND +BS, Last BM  Extremities: No edema, + peripheral pulses  Incisions:   Tubes:    HOSPITAL MEDICATIONS:  MEDICATIONS  (STANDING):  piperacillin/tazobactam IVPB. 3.375 Gram(s) IV Intermittent every 12 hours  heparin  Injectable 5000 Unit(s) SubCutaneous every 8 hours  levothyroxine Injectable 25 MICROGram(s) IV Push daily  metoprolol IVPB 10 milliGRAM(s) IV Intermittent every 6 hours  dextrose 5% + sodium chloride 0.9%. 1000 milliLiter(s) (75 mL/Hr) IV Continuous <Continuous>    MEDICATIONS  (PRN):  ALBUTerol/ipratropium for Nebulization 3 milliLiter(s) Nebulizer every 6 hours PRN Shortness of Breath and/or Wheezing  ipratropium    for Nebulization 500 MICROGram(s) Nebulizer every 6 hours PRN Shortness of Breath and/or Wheezing      LABS:                        13.0   x     )-----------( 150      ( 03 Aug 2017 06:00 )             46.7     08-02    144  |  109<H>  |  32<H>  ----------------------------<  96  4.1   |  16<L>  |  1.50<H>    Ca    8.7      02 Aug 2017 06:15  Phos  3.5     08-02  Mg     2.3     08-02      PT/INR - ( 02 Aug 2017 06:30 )   PT: 13.9 SEC;   INR: 1.24          PTT - ( 02 Aug 2017 06:30 )  PTT:30.3 SEC          MICROBIOLOGY:         RADIOLOGY:  [ ] Reviewed and interpreted by me    EKG: MICU ACCEPT NOTE    CHIEF COMPLAINT: Respiratory Distress    HPI:    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), COPD, CKD III, recent admission in June for UGI bleed who presented with worsening SOB per daughter. Per the daughter, the patient had been having progressively worsening SOB, so EMS called. Patient with SOB with exertion at baseline per daughter. She is a former smoker, having quit "many years ago." Unclear history of COPD, patient not on O2 or BiPAP at home.     In the ED, VBG revealed a pH of 7.29, pCO2 of 53. Pro-Bnp 73878. Lactate 2.9. CXR showing reticular opacities in bilateral lung bases, unchanged from prior, may represent pulmonary fibrosis. Given DuoNebs x 2, Xopenex x 1, Solumedrol 125 x 1 and 2g MgSO4. B-lines noted on POCUS per ED, given 40mg IVP of Lasix. Patient started on BiPAP on arrival to ED, repeat VBG showed worsening acidosis with pH 7.27 and pCO2 of 58. Patient with worsening lethargy, intubated for hypercapnic respiratory failure likely due to COPD, possible aspiration pneumonia.     Patient was intubated and transferred to the MICU. CT head showed No acute intracranial hemorrhage, mass effect, or CT evidence of a large acute of transcortical infarct. POCUS revealed bilateral LL consolidations, bilateral lateral and anterior B-lines. Trace R plef. on Echo septal hypokinesis, AR, MR, TR. LVH/RVH and pulmonary HTN. UA was positive, urine culture positive for >100,000 gram positive rods. Patient was started on vancomycin and zosyn for PNA and UTI. Pt passed CPAP trials on 7/30 and was extubated. Pt was transferred to the floor on NC. While on the floor pt was hypothermic, with RRT called for Afib with RVR up to 120-140s, which resolved w/o intervention. Pt had been receiving TF via NGT, which was pulled out by patient, with multiple attempts at reinsertion unsuccessfully, with patient now refusing. GI was consulted for PEG tube placement, despite concerns by family about GOC. Plan for PEG Fri/Mon per note. This AM pt developed worsening respiratory status, and was intubated for hypoxic respiratory failure.    PAST MEDICAL & SURGICAL HISTORY:  Symptomatic bradycardia  Obese  Pruritus  Anxiety  Atrial fibrillation and flutter  HLD (hyperlipidemia)  CKD (chronic kidney disease)  COPD (chronic obstructive pulmonary disease)  Arthritis  Benign essential HTN  Senile dementia with depression  Status post bilateral total hip replacement: several years ago  Status post placement of implantable loop recorder: 9/15  History of back surgery: x3, approimately 1980, 1985, 1991      FAMILY HISTORY:  No pertinent family history in first degree relatives      SOCIAL HISTORY:  Smoking: former smoker  EtOH Use: na  Marital Status:   Occupation: not working  Recent Travel: none  Country of Birth:   Advance Directives: full code    Allergies    latex (Flushing (Skin))  No Known Drug Allergies    Intolerances    beta blockers (Hypotension)      HOME MEDICATIONS:    REVIEW OF SYSTEMS:  CONSTITUTIONAL: No weakness, fevers or chills  EYES/ENT: No visual changes;  No vertigo or throat pain   NECK: No pain or stiffness  RESPIRATORY: No cough, wheezing, hemoptysis; No shortness of breath  CARDIOVASCULAR: No chest pain or palpitations  GASTROINTESTINAL: No abdominal or epigastric pain. No nausea, vomiting, or hematemesis; No diarrhea or constipation. No melena or hematochezia.  GENITOURINARY: No dysuria, frequency or hematuria  NEUROLOGICAL: No numbness or weakness  SKIN: No itching, burning, rashes, or lesions   All other review of systems is negative unless indicated above.  [ ] All other systems negative  [x ] Unable to assess ROS because Intubated/sedated     OBJECTIVE:  ICU Vital Signs Last 24 Hrs  T(C): 36.7 (02 Aug 2017 20:25), Max: 36.7 (02 Aug 2017 20:25)  T(F): 98.1 (02 Aug 2017 20:25), Max: 98.1 (02 Aug 2017 20:25)  HR: 92 (03 Aug 2017 00:06) (92 - 144)  BP: 157/96 (03 Aug 2017 00:06) (124/82 - 157/96)  RR: 25 (03 Aug 2017 00:06) (18 - 25)  SpO2: 97% (03 Aug 2017 00:06) (96% - 97%)        CAPILLARY BLOOD GLUCOSE          PHYSICAL EXAM:  General: Cachectic, unresponsive  HEENT: PERRLA, EOMI, dry mucous membranes with blood at the nares and oropharynx  Neurology: A&Ox0, unresponsive, intubated, sedated  Respiratory: Ronchi B/L, intubated  CV: Tachycardia, irregular rhythm, S1S2, no murmurs, rubs or gallops  Abdominal: Soft, NT, ND +BS, Last BM yest  Extremities: No edema, very weak peripheral pulses  Incisions: n/a  Tubes: [facility] MEDICATIONS:  MEDICATIONS  (STANDING):  piperacillin/tazobactam IVPB. 3.375 Gram(s) IV Intermittent every 12 hours  heparin  Injectable 5000 Unit(s) SubCutaneous every 8 hours  levothyroxine Injectable 25 MICROGram(s) IV Push daily  metoprolol IVPB 10 milliGRAM(s) IV Intermittent every 6 hours  dextrose 5% + sodium chloride 0.9%. 1000 milliLiter(s) (75 mL/Hr) IV Continuous <Continuous>    MEDICATIONS  (PRN):  ALBUTerol/ipratropium for Nebulization 3 milliLiter(s) Nebulizer every 6 hours PRN Shortness of Breath and/or Wheezing  ipratropium    for Nebulization 500 MICROGram(s) Nebulizer every 6 hours PRN Shortness of Breath and/or Wheezing      LABS:                        13.0   x     )-----------( 150      ( 03 Aug 2017 06:00 )             46.7     08-02    144  |  109<H>  |  32<H>  ----------------------------<  96  4.1   |  16<L>  |  1.50<H>    Ca    8.7      02 Aug 2017 06:15  Phos  3.5     08-02  Mg     2.3     08-02      PT/INR - ( 02 Aug 2017 06:30 )   PT: 13.9 SEC;   INR: 1.24          PTT - ( 02 Aug 2017 06:30 )  PTT:30.3 SEC          MICROBIOLOGY:         RADIOLOGY:  [ ] Reviewed and interpreted by me    EKG: afib with RVR MICU ACCEPT NOTE    CHIEF COMPLAINT: Respiratory Distress    HPI:    87 yo F w/PMH of dementia, b/l hip replacements, Afib (not on AC secondary to previous ICH), symptomatic bradycardia (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), COPD, CKD III, recent admission in June for UGI bleed who presented with worsening SOB per daughter. Per the daughter, the patient had been having progressively worsening SOB, so EMS called. Patient with SOB with exertion at baseline per daughter. She is a former smoker, having quit "many years ago." Unclear history of COPD, patient not on O2 or BiPAP at home.     In the ED, VBG revealed a pH of 7.29, pCO2 of 53. Pro-Bnp 86400. Lactate 2.9. CXR showing reticular opacities in bilateral lung bases, unchanged from prior, may represent pulmonary fibrosis. Given DuoNebs x 2, Xopenex x 1, Solumedrol 125 x 1 and 2g MgSO4. B-lines noted on POCUS per ED, given 40mg IVP of Lasix. Patient started on BiPAP on arrival to ED, repeat VBG showed worsening acidosis with pH 7.27 and pCO2 of 58. Patient with worsening lethargy, intubated for hypercapnic respiratory failure likely due to COPD, possible aspiration pneumonia.     Patient was intubated and transferred to the MICU. CT head showed No acute intracranial hemorrhage, mass effect, or CT evidence of a large acute of transcortical infarct. POCUS revealed bilateral LL consolidations, bilateral lateral and anterior B-lines. Trace R plef. on Echo septal hypokinesis, AR, MR, TR. LVH/RVH and pulmonary HTN. UA was positive, urine culture positive for >100,000 gram positive rods. Patient was started on vancomycin and zosyn for PNA and UTI. Pt passed CPAP trials on 7/30 and was extubated. Pt was transferred to the floor on NC. While on the floor pt was hypothermic, with RRT called for Afib with RVR up to 120-140s, which resolved w/o intervention. Pt had been receiving TF via NGT, which was pulled out by patient, with multiple attempts at reinsertion unsuccessfully, with patient now refusing. GI was consulted for PEG tube placement, despite concerns by family about GOC. Plan for PEG Fri/Mon per note. This AM pt was found unresponsive on AM rounds, did not wake to sternal rub. RRT called. GOC Discussions with family this AM w/ pt still FULL CODE with instructions to intubate. Pt intubated for airway protection. Pt hypotensive to 50s/teens, started on norepi. POCUS showed cardiac  Etiology of event not immediately clear, perhaps aspiration event. Unclear whether family was feeding patient despite NPO.    PAST MEDICAL & SURGICAL HISTORY:  Symptomatic bradycardia  Obese  Pruritus  Anxiety  Atrial fibrillation and flutter  HLD (hyperlipidemia)  CKD (chronic kidney disease)  COPD (chronic obstructive pulmonary disease)  Arthritis  Benign essential HTN  Senile dementia with depression  Status post bilateral total hip replacement: several years ago  Status post placement of implantable loop recorder: 9/15  History of back surgery: x3, approimately 1980, 1985, 1991      FAMILY HISTORY:  No pertinent family history in first degree relatives      SOCIAL HISTORY:  Smoking: former smoker  EtOH Use: na  Marital Status:   Occupation: not working  Recent Travel: none  Country of Birth:   Advance Directives: full code    Allergies    latex (Flushing (Skin))  No Known Drug Allergies    Intolerances    beta blockers (Hypotension)      HOME MEDICATIONS:    REVIEW OF SYSTEMS:  CONSTITUTIONAL: No weakness, fevers or chills  EYES/ENT: No visual changes;  No vertigo or throat pain   NECK: No pain or stiffness  RESPIRATORY: No cough, wheezing, hemoptysis; No shortness of breath  CARDIOVASCULAR: No chest pain or palpitations  GASTROINTESTINAL: No abdominal or epigastric pain. No nausea, vomiting, or hematemesis; No diarrhea or constipation. No melena or hematochezia.  GENITOURINARY: No dysuria, frequency or hematuria  NEUROLOGICAL: No numbness or weakness  SKIN: No itching, burning, rashes, or lesions   All other review of systems is negative unless indicated above.  [ ] All other systems negative  [x ] Unable to assess ROS because Intubated/sedated     OBJECTIVE:  ICU Vital Signs Last 24 Hrs  T(C): 36.7 (02 Aug 2017 20:25), Max: 36.7 (02 Aug 2017 20:25)  T(F): 98.1 (02 Aug 2017 20:25), Max: 98.1 (02 Aug 2017 20:25)  HR: 92 (03 Aug 2017 00:06) (92 - 144)  BP: 157/96 (03 Aug 2017 00:06) (124/82 - 157/96)  RR: 25 (03 Aug 2017 00:06) (18 - 25)  SpO2: 97% (03 Aug 2017 00:06) (96% - 97%)        CAPILLARY BLOOD GLUCOSE          PHYSICAL EXAM:  General: Cachectic, unresponsive  HEENT: PERRLA, EOMI, dry mucous membranes with blood at the nares and oropharynx  Neurology: A&Ox0, unresponsive, intubated, sedated  Respiratory: Ronchi B/L, intubated  CV: Tachycardia, irregular rhythm, S1S2, no murmurs, rubs or gallops  Abdominal: Soft, NT, ND +BS, Last BM yest  Extremities: No edema, very weak peripheral pulses  Incisions: n/a  Tubes: [facility] MEDICATIONS:  MEDICATIONS  (STANDING):  piperacillin/tazobactam IVPB. 3.375 Gram(s) IV Intermittent every 12 hours  heparin  Injectable 5000 Unit(s) SubCutaneous every 8 hours  levothyroxine Injectable 25 MICROGram(s) IV Push daily  metoprolol IVPB 10 milliGRAM(s) IV Intermittent every 6 hours  dextrose 5% + sodium chloride 0.9%. 1000 milliLiter(s) (75 mL/Hr) IV Continuous <Continuous>    MEDICATIONS  (PRN):  ALBUTerol/ipratropium for Nebulization 3 milliLiter(s) Nebulizer every 6 hours PRN Shortness of Breath and/or Wheezing  ipratropium    for Nebulization 500 MICROGram(s) Nebulizer every 6 hours PRN Shortness of Breath and/or Wheezing      LABS:                        13.0   x     )-----------( 150      ( 03 Aug 2017 06:00 )             46.7     08-02    144  |  109<H>  |  32<H>  ----------------------------<  96  4.1   |  16<L>  |  1.50<H>    Ca    8.7      02 Aug 2017 06:15  Phos  3.5     08-02  Mg     2.3     08-02      PT/INR - ( 02 Aug 2017 06:30 )   PT: 13.9 SEC;   INR: 1.24          PTT - ( 02 Aug 2017 06:30 )  PTT:30.3 SEC          MICROBIOLOGY:         RADIOLOGY:  [ ] Reviewed and interpreted by me    EKG: afib with RVR

## 2017-08-03 NOTE — CONSULT NOTE ADULT - ASSESSMENT
87 yo F w/PMH of dementia, b/l hip replacements, Afib (not on AC secondary to previous ICH), symptomatic bradycardia (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), COPD, CKD III, recent admission in June for UGI bleed who presented with worsening SOB, now requiring BiPAP in the setting of hypercapnia
87 yo female with acute respiratory failure actively dying
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), COPD, CKD III, recent admission in June for UGI bleed who presented with worsening SOB, initially intubated for hypercapnic respiratory failure in the setting of likely chronic aspiration; improved and extubated. Also has UTI with positive UA and urine culture. Now reintubated for airway protecting after found unresponsive in bed this AM with new hypotension requiring pressor support.    #Neuro  - intubated, sedated, unresponsive this AM; consider reimaging head  - h/o Dementia. AAOx1 at baseline     #Pulm  Acute respiratory failure with hypercapnia previously  - Intubated now for airway protection after being found unresponsive  - Check ABG post intubation  - POCUS showed B-lines, consolidations, LV dysfunction    #CV  in Atrial fibrillation, in 150s on arrival to ICU  - patient with history of Afib not on AC due to history of ICH last year  - continue to hold AC as risks outweigh benefits at this time.     #ID  possible aspiration PNA. U/A positive, UCx positive for gram negative anna marie >100,000   - c/w Zosyn Q12    #GI  - NPO until PEG Friday  - failed s/s study    #Renal  CKD (chronic kidney disease) stage III    - patient with history of CKD III  - Follow sCr    #  -UTI w/ positive UA  - urine cx positive for gram negative rods >100,000  - c/w zosyn    # Endo:   - c/w home levothyroxine 50 mcg daily    #Heme  -No issues    #DVT PPx - heparin SQ, SCDs
This is a 88yoF w/ PMhx dementia, b/l hip replacements, afib (no AC h/o ICH), PPM for symptomatic bradycardia, COPD, CKD p/w worsening SOB failing to respond to BIPAP requiring intubation in ED.  Cardiology consulted for IFTIKHAR in the setting tachycardia.
IMPRESSION:  -Dysphagia to all liquids, nectar thick and solids as assessed by speech and swallow and evidenced by MBS; this has been chronic (atleast 6 months) and complicated by aspiration, plan for alternate means of feeding, family interested in peg tube, refusing NG tube  -Hypercapnic respiratory failure in the setting aspiration PNA s/p intubation, now on 2L NC, dyspneic     PLAN:   -continue to monitor resp status, need to wean off oxygen  -plan for peg tube placement either Fri/Monday (currently pt is tachypneic, dyspneic and hypoxic, thus will hold off)  -spoke to daughter ([phone]) who is in agreement with placement of peg tube  -please get CBC, coags on Fri    GI team will continue to monitor  Thank you for the consult.

## 2017-08-03 NOTE — PROGRESS NOTE ADULT - PROBLEM SELECTOR PLAN 4
- maintenance fluids with NS @ 75 cc/hr  - daughter HCP agreeable for PEG  - plan for GI to place PEG tube  - NG tube to be placed for TF and meds in meanwhile; attempt to place NGT last night unsuccessful, will reattempt

## 2017-08-03 NOTE — CONSULT NOTE ADULT - SUBJECTIVE AND OBJECTIVE BOX
HPI:  Patient is an 87 yo F w/PMH of dementia, b/l hip replacements, Afib (not on AC secondary to previous ICH), symptomatic bradycardia (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), COPD, CKD III, recent admission in June for UGI bleed who presented with worsening SOB per daughter. Per the daughter, the patient has been having progressively worsening SOB today, so EMS called. Patient with SOB with exertion at baseline per daughter. She is a former smoker, having quit "many years ago." Unclear history of COPD, patient not on O2 or BiPAP at home.     In the ED, VBG revealed a pH of 7.29, pCO2 of 53. Pro-Bnp 76035. Lactate 2.9. CXR showing reticular opacities in bilateral lung bases, unchanged from prior, may represent pulmonary fibrosis. Given DuoNebs x 2, Xopenex x 1, Solumedrol 125 x 1 and 2g MgSO4. B-lines noted on POCUS per ED, given 40mg IVP of Lasix. Patient started on BiPAP on arrival to ED, repeat VBG showed worsening acidosis with pH 7.27 and pCO2 of 58. Patient with worsening lethargy, intubated for hypercapnic respiratory failure. (29 Jul 2017 00:41)    Patient found unresponsive this am- now intubated on pressors  family at bedside    PERTINENT PMH REVIEWED:  [ ] YES [ x] NO           SOCIAL HISTORY:  Significant other/partner:  [ ] YES  [x ] NO            Children:  [x ] YES  [ ] NO                   Scientology/Spirituality:  Subtance hx:  [ ] YES   [x ] NO           Tobacco hx:  [x ] YES  [ ] NO             Alcohol hx: [ ] YES  [x ] NO        Home Opiod hx:  [ ] YES  [x ] NO   Living Situation: [x ] Home  [ ] Long term care  [ ] Rehab    REFERRALS:   [ ] Chaplaincy  [ ] Hospice  [ ] Child Life  [ ] Social Work  [ ] Case management [ ] Holistic Therapy     FAMILY HISTORY:  No pertinent family history in first degree relatives    [x ] Family history non contributory     BASELINE ADLs (prior to admission):  Independent [ ] moderately [ ] fully   Dependent   [ ] moderately [x ] fully    ADVANCE DIRECTIVES:  [ ] YES [x ] NO   DNR [ ] YES [ ] NO                      MOLST  [ ] YES [ ] NO    Living Will  [ ] YES [ ] NO    Health Care Proxy [ ] YES  [ ] NO      [ ] Surrogate  [ ] HCP  [ ] Guardian:                  see emr                                                Phone#:    Allergies    latex (Flushing (Skin))  No Known Drug Allergies    Intolerances    beta blockers (Hypotension)      MEDICATIONS  (STANDING):  heparin  Injectable 5000 Unit(s) SubCutaneous every 8 hours  levothyroxine Injectable 25 MICROGram(s) IV Push daily  dextrose 5% + sodium chloride 0.9%. 1000 milliLiter(s) (75 mL/Hr) IV Continuous <Continuous>  phentolamine Injectable 5 milliGRAM(s) SubCutaneous once  phenylephrine    Infusion 0.4 MICROgram(s)/kG/Min (5.033 mL/Hr) IV Continuous <Continuous>    MEDICATIONS  (PRN):      PRESENT SYMPTOMS:  Source: [ ] Patient   [x ] Family   [ x] Team     Pain: [ ] YES [x ] NO       Dyspnea: [ ] YES [ ] NO   Anxiety: [ ] YES [ ] NO  Fatigue: [ ] YES [ ] NO   Nausea: [ ] YES [ ] NO  Loss of appetite: [ ] YES [ ] NO   Constipation: [ ] YES [ ] NO     Other Symptoms:  [ ] All other review of systems negative   [x ] Unable to obtain due to poor mentation     Karnofsky Performance Score/Palliative Performance Status Version 2:   10      %  Protein Calorie Malutrition:  [ ] Mild   [ ] Moderate   [ ] Severe     Vital Signs Last 24 Hrs  T(C): 35.6 (03 Aug 2017 12:00), Max: 36.7 (02 Aug 2017 20:25)  T(F): 96 (03 Aug 2017 12:00), Max: 98.1 (02 Aug 2017 20:25)  HR: 144 (03 Aug 2017 12:00) (92 - 151)  BP: 38/19 (03 Aug 2017 12:00) (38/19 - 163/128)  BP(mean): 22 (03 Aug 2017 12:00) (14 - 138)  RR: 27 (03 Aug 2017 12:00) (18 - 38)  SpO2: 74% (03 Aug 2017 12:00) (74% - 100%)    Physical Exam:    General: [ ] Alert,  A&O x     [ ] lethargic   [ ] Agitated   [ ] Cachexia +intubated  HEENT: [ ] Normal   [ ] Dry mouth   [x ] ET Tube    [ ] Trach   Lungs: [ ] Clear [ ] Rhonchi  [ ] Crackles [ ] Wheezing [ ] Tachypnea  [ ] Audible excessive secretions   Cardiovaxscular:  [ ] Regular rate and rhythm  [ ] Irregular [x ] Tachycardia   [ ] Bradycardia   Abdomen: [x ] Soft  [x ] Distended  [ ]  [ ] +BS  [ ] Non tender [ ] Tender  [ ]PEG   [ ] NGT   Last BM:     Genitourinary: [ ] Normal [ ] Incontinent   [ ] Oliguria/Anuria   [x ] Guerrero  Musculoskeletal:  [ ] Normal   [ ] Generalized weakness  [ x] Bedbound   Neurological: [ ] No focal deficits  [ xx] Cognitive impairment     Skin: x ] Normal   [ ] Pressure ulcers     LABS:                        13.0   22.66 )-----------( 150      ( 03 Aug 2017 06:00 )             46.7     08-03    146<H>  |  108<H>  |  44<H>  ----------------------------<  104<H>  4.6   |  9<LL>  |  2.31<H>    Ca    9.2      03 Aug 2017 06:00  Phos  6.1     08-03  Mg     2.3     08-03      PT/INR - ( 02 Aug 2017 06:30 )   PT: 13.9 SEC;   INR: 1.24          PTT - ( 02 Aug 2017 06:30 )  PTT:30.3 SEC    I&O's Summary      RADIOLOGY & ADDITIONAL STUDIES:

## 2017-08-03 NOTE — PROGRESS NOTE ADULT - PROBLEM SELECTOR PLAN 2
- patient with history of Afib not on AC due to history of ICH last year  - increase lopressor to 10mg IV q6hr  - consult EP regarding PPM interrogation  - poor candidate for A/C due to recent ICH while on A/C.  - AAOx1 at baseline per daughter, however given MS changes (lethargy), CT head obtained in ED which showed no acute intracranial process  - continue holding AC as risks outweigh benefits at this time

## 2017-08-03 NOTE — PROGRESS NOTE ADULT - PROBLEM SELECTOR PLAN 10
Subq heparin
Discussed about goals of care with marquez by bedside for 30 minutes. HCP and patient wants to discuss comfort/palliative care management.  Palliative care consult requrested.
Subq heparin

## 2017-08-03 NOTE — CONSULT NOTE ADULT - PROBLEM SELECTOR RECOMMENDATION 4
family at bedside- patient actively dying at this time  family made aware  emotional and spiritual support provided   called at request of family

## 2017-08-29 ENCOUNTER — APPOINTMENT (OUTPATIENT)
Dept: ELECTROPHYSIOLOGY | Facility: CLINIC | Age: 82
End: 2017-08-29

## 2018-01-23 NOTE — DIETITIAN INITIAL EVALUATION ADULT. - FEEDING SKILL
1/23/2018      RE: Amadou Lewis  [address] [place] [address]       Assessment and Plan:  1. ESRD:  secondary to polycystic kidney disease and diabetic nephropathy s/p LDKT on 10/10/2013. Complicate by repeated bouts of rejection. Creatinine stable, currently serum creatinine is ~ 5.2 mg/dL. We will continue with checking BMP every 4 weeks. No significant uremic symptoms.   2. Immunosuppression:  Mycophenolic acid and Cyclosporine with acceptable levels.   3. Hypertension: Somewhat controlled, He thinks that is elevated in clinic but better at home, however he has not been checking it at home. Did not tolerate metoprolol.   4. Anemia secondary to renal disease: Hgb is stable, at baseline. Monitor  5. Secondary renal hyperparathyroidism: most recent PTH was 790 in 8/2017. He is more compliant with Sensipar. Will recheck next month   6. Nausea and vomiting: improved   7. Diabetes mellitus type II management per PCP  8. H/o CAD- s/p PCI with DESx2, added small dose statin (didn't tolerate BB)  9. Acidosis: wanted to use the tablets again   10. PCKD: MRI/MRA from the [facility] didn't reveal aneurysms     Assessment and plan was discussed with patient and he voiced his understanding and agreement.      Reason for Visit:  Mr. Lewis is here for follow up on kidney transplant rejection    HPI:   Amadou Lewis is a 54 year old male with ESKD from polycystic kidney disease and diabetic nephropathy and is status post LDKT on 10/10/2013 with immediate graft function. 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 Subsequently had GI issues that was difficult to maintain his immunosuppression with and eventually had intractable rejection   Since was seen last has been stable. N/V stable/ improved only in the morning and not interfering with medications. We discussed uremic symptoms and compliance with medications.   Does not  endorse major uremic symptoms and would like to keep medical management of CKD for now. We discussed completing the work up so he can be activated on the list.            Transplant Hx:       Tx: LDKT  Date: 10/10/2013       Present Maintenance IS: Cyclosporine and Mycophenolic acid       Baseline Creatinine:  3-3.5 mg/dL       Recent DSA: No         Biopsy: Yes: 1/21/2016, 2/15/2016, 09/28/2016        Home BP: Not checked.      ROS:   A comprehensive review of systems was obtained and negative, except as noted in the HPI or PMH.    Active Medical Problems:  Patient Active Problem List   Diagnosis     Type II diabetes mellitus with renal manifestations (H)     Diabetes mellitus with background retinopathy (H)     Polycystic kidney     NONSPECIFIC MEDICAL HISTORY     Coronary artery disease     Retinopathy     Hyperlipidemia LDL goal <70     Premature ventricular contractions (PVCs) (VPCs)     Kidney replaced by transplant     Immunosuppressed status (H)     Kidney transplant rejection     Hyperglycemia     Hypertension     Anemia in chronic renal disease     Care after organ transplant     Esophageal ulcer       Personal Hx:  Social History     Social History     Marital status: Single     Spouse name: N/A     Number of children: 2     Years of education: 14     Occupational History     [employer]     Social History Main Topics     Smoking status: Never Smoker     Smokeless tobacco: Never Used     Alcohol use No     Drug use: No     Sexual activity: Yes     Partners: Female     Other Topics Concern     Not on file     Social History Narrative       Allergies:  Allergies   Allergen Reactions     No Known Allergies        Medications:  Current Outpatient Prescriptions   Medication     cloNIDine (CATAPRES-TTS1) 0.1 MG/24HR WK patch     MYFORTIC (BRAND) 180 MG EC TABLET     cholecalciferol (VITAMIN  -D) 1000 UNITS capsule     sodium bicarbonate 650 MG tablet     NIFEdipine ER osmotic (PROCARDIA XL) 30 MG 24 hr  "tablet     cinacalcet (SENSIPAR) 30 MG tablet     atorvastatin (LIPITOR) 10 MG tablet     cycloSPORINE modified (GENERIC EQUIVALENT) 25 MG capsule     cloNIDine (CATAPRES) 0.1 MG tablet     insulin aspart (NOVOLOG PEN) 100 UNIT/ML soln     insulin glargine (LANTUS) 100 UNIT/ML PEN     blood glucose monitoring (NO BRAND SPECIFIED) test strip     HYDROcodone-acetaminophen (NORCO) 5-325 MG per tablet     ondansetron (ZOFRAN) 4 MG tablet     No current facility-administered medications for this visit.          Vitals:  BP (!) 189/95  Pulse 87  Ht 1.702 m (5' 7\")  Wt 79.7 kg (175 lb 9.6 oz)  SpO2 98%  BMI 27.5 kg/m2    Exam:   GENERAL APPEARANCE: alert and no distress, chronically ill looking  HENT: mouth without ulcers or lesions  LYMPHATICS: no cervical or supraclavicular nodes  RESP: lungs clear to auscultation - no rales, rhonchi or wheezes  CV: regular rhythm, normal rate, no rub, no murmur  EDEMA: no LE edema bilaterally, AVF in the left forearm with palpable thrill   ABDOMEN: protruded, soft, slightly distended, surgical incision well healed, nontender, bowel sounds normal  MS: extremities normal - no gross deformities noted, no evidence of inflammation in joints, no muscle tenderness  SKIN: no rash    Results: reviewed    Deyvi Dick MD      " supervision

## 2019-01-01 NOTE — PROGRESS NOTE ADULT - PROBLEM SELECTOR PROBLEM 5
First name:                       MR # [id number]  Date of Birth: [birth year]	Time of Birth:     Birth Weight:      Admission Date and Time:  19 @ 03:06         Gestational Age: 32.2      Source of admission [ __ ] Inborn     [ __ ]Transport from    [facility]: Neonatologist requested by Dr Mendoza to attend a RC/S under general anesthesia of a 39 y/o  mom at 32.2 weeks GA secondary to PTL and transverse lie.  She had + PNC, is O pos, HBSAg neg, HIV neg, RPR NR, Rubella Imm, GBS pos, hx of Von Willebrand Disease (she was on heparin until a couple of hrs PTD), hx of seizures(Tx with Keppra, switched to Topamax and then D/C'd), hx of depression on Zoloft. 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  L&D:  Mom was admitted to [facility] from [facility] aprox 11 days ago for PROM.  She received a full course of betamethasone 1 week ago.   She was treated with Ampicillin X48 hrs,  Azithromycin X1, and then po amoxicillin.  SROM aprox 10days ago.  Baby born via C/S, breech, floppy, transferred to radiant warmer, dried, stimulated, with improvement in tone and active cry.  Then baby became apneic and with decreased HR for which CPAP +5 was given with good response.  Then Baby's HR decreased again for which PPV was given.  Baby's HR improved and then CPAP +5 was continued. APGAR Scores of 8&9. Baby was transferred to NICU for further evaluation and management.       Social History: No history of alcohol/tobacco exposure obtained  FHx: non-contributory to the condition being treated or details of FH documented here  ROS: unable to obtain ()     Interval Events:    **************************************************************************************************  Age:6d    LOS:6d    Vital Signs:  T(C): 37.3 (02 @ 06:00), Max: 37.3 ( @ 00:00)  HR: 154 ( @ 06:00) (140 - 168)  BP: 73/43 ( @ 21:00) (73/43 - 73/43)  RR: 50 ( @ 06:00) (44 - 56)  SpO2: 100% ( @ 06:00) (97% - 100%)      LABS:                                15.0   13.0 )-----------( 412             [ @ 03:58]                  43.8  S 57.0%  B 0%  [place] 0%  Myelo 0%  Promyelo 0%  Blasts 0%  Lymph 31.0%  Mono 8.0%  Eos 3.0%  Baso 0.0%  Retic 0%    141  |106  | 6.0    ------------------<96   Ca 10.1 Mg N/A  Ph N/A   [ @ 08:17]  5.4   | 20.0 | 0.50      145  |110  | 9.0    ------------------<99   Ca 10.4 Mg N/A  Ph N/A   [ @ 05:17]  5.2   | 22.0 | 0.56      Bili T/D  [ @ 05:16] - 2.1/0.4, Bili T/D  [ @ 04:17] - 7.5/0.4, Bili T/D  [ @ 06:36] - 8.3/0.3  Meds: hepatitis B IntraMuscular Vaccine - Peds 0.5 milliLiter(s) once    RESPIRATORY SUPPORT:  [ _ ] Mechanical Ventilation:   [ _ ] Nasal Cannula: _ __ _ Liters, FiO2: ___ %  [ x ]RA    **************************************************************************************************		    PHYSICAL EXAM:  General:	Awake and active;   Head:		AFOF  Eyes:		Normally set bilaterally  Ears:		Patent bilaterally, no deformities  Nose/Mouth:	Nares patent, palate intact  Neck:		No masses, intact clavicles  Chest/Lungs:      Breath sounds equal to auscultation. No retractions  CV:		No murmurs appreciated, normal pulses bilaterally  Abdomen:          Soft nontender nondistended, no masses, bowel sounds present  :		Normal for gestational age  Back:		Intact skin, no sacral dimples or tags  Anus:		Grossly patent  Extremities:	FROM, no hip clicks  Skin:		Pink, moist membranes; mild jaundice; no lesions  Neuro exam:	Appropriate tone, activity    DISCHARGE PLANNING (date and status):  Hep B Vacc : deferred b/o LBW  CCHD:	Passed 2/15/19		  : PTD					  Hearing: PTD   screen: sent x 1 pre-TPN, 2nd 2/15/19	  Circumcision: n/a  Hip  rec: recommend at 44-46 wk PMA b/o breech  	  Synagis: N/A			  Other Immunizations (with dates):      Neurodevelop eval?	PTD  CPR class done?  Ongoing  	  PVS at DC?  TVS at DC?	  FE at DC?	    PMD:          Name:  ______________ _             Contact information:  ______________ _  Pharmacy: Name:  ______________ _              Contact information:  ______________ _    Follow-up appointments (list):  PMD  NDE  [facility]    Time spent on the total subsequent encounter with >50% of the visit spent on counseling and/or coordination of care:[ _ ] 15 min[ _ ] 25 min[ x_ ] 35 min  [ _ ] Discharge time spent >30 min   [ __ ] Car seat oxymetry reviewed. Atrial fibrillation UTI (urinary tract infection)

## 2019-05-23 NOTE — DIETITIAN INITIAL EVALUATION ADULT. - NS AS NUTRI INTERV MEALS SNACK
Patient is the caller-    Patient is stating that she has a cold, annoying cough, and sinus stuff. She is looking for something to help her sleep at night from the coughing. Patient also states that she is wheezing.    Patient states she heard about this lidocan lozenge that helped stop the coughing but really doesn't know anything about it or if it would help.    Patient is at work til 2:30pm    Ph#[phone]   1. Suggest: Speech Bedside Swallow Evaluation as ordered; MBS if needed (to determine appropriate PO diet/fluid consistency);             2. Suggest: Change PO diet consistency/fluid consistency per SLP recommendation; If “no liquids” restriction to continue consider adding IV hydration;             3. Monitor PO diet tolerance;                4. Monitor labs, weights, hydration status;/Other (specify)/Texture-modified diet

## 2019-07-21 NOTE — PROGRESS NOTE ADULT - ATTENDING COMMENTS
Agree with above, seen and examined with resident. Critically ill requiring frequent bedside visits. Weaning trials. Antibiotics for pneumonia.
Above events noted. Currently in ICU intubated and on ventilator. Await evaluation as to goals of care and family wishes.  Continue supportive care as per ICU staff.
Diagnostic Uncertainty
Patient seen and examined.  Agree with resident note.  88F with acute hypercarbic respiratory failure from aspiration PNA from dysphagia complicated by COPD, Afib - not on A/C due to ICH hx, dementia, HTN, CKD stage 3.  Treat for aspiration PNA.  F/U MBS for dysphagia - will discuss w/ patient and family about PEG if required. Would benefit from A/C for CVA risk stratification for Afib - however uncertain about ambulation and safety at this time.  Will continue to address.
Patient seen and examined.  Agree with resident note.  88F with acute hypercarbic respiratory failure from aspiration PNA from dysphagia complicated by COPD, Afib with RVR - not on A/C due to ICH hx, dementia, HTN, CKD stage 3.  Tele monitoring - B-blocker for rate control. IV and transition to PO/PEG. Treat for aspiration PNA.  Agreeable for PEG - NG tube until PEG placed.
Patient seen and examined.  Agree with resident note.  88F with acute hypercarbic respiratory failure from aspiration PNA from dysphagia complicated by COPD, Afib with RVR - not on A/C due to ICH hx, dementia, HTN, CKD stage 3.  Tele monitoring - B-blocker for rate control. IV and transition to PO/PEG. Treat for aspiration PNA.  Agreeable for PEG - however having difficulty establishing NG tube access for B-Blocker.  Had discussion with family about goals of care - may be leaning towards palliation.  Will continue to monitor.
Ms. Delgadillo was evaluated on rounds in the MICU.  Her assessment is recorded in the MICU residents note.  Plan  1- Intubated due to hypercarbic and hypoxic respiratory failure.  Gas exchange adequate on MV.  2- H/O Afib now rate controlled..  PPM placed previously.  3- H/O CKD and Lasix being dosed to provide adequate U/O.  4- IV abx  5- DVT prophylaxis.

## 2021-05-31 NOTE — ED PROVIDER NOTE - OBJECTIVE STATEMENT
Pt is 87 y/o female with Pmhx of COPD, CKD, dementia, a-fib (not on ac due to hx of intracranial bleed) here for eval of bloody stools x 2 days. As per  pt's daughter pt was noted to have loose stools for the past 2-3 days, yesterday noted to have some blood mixed with the stool, today am bright red blood was noted by another daughter while she was changing the pt, also the stool was noted to be black. Otherwise pt has been feeling well - family reports good appetite, there is no fever, chills, n/v/d reported. pt is on baby asa, no other blood thinners use, no hx of NSAIds use. Daughter doesn't recall if pt had endoscopy or colonoscopy ever. PCP - non-[place]. At baseline mentation. (-) cp, SOB, syncope.
ambulatory

## 2022-08-22 NOTE — ED ADULT NURSE NOTE - MODE OF DISCHARGE
[address]   Progress Note and Procedure Note      [address] [place] RECORD NUMBER:  [id number]  AGE: 79 y.o. GENDER: male  : 1954  EPISODE DATE:  2022    Subjective:     Chief Complaint   Patient presents with    Wound Check     Left 2nd toe         HISTORY of PRESENT ILLNESS HPI     Darlyn Jackson is a 79 y.o. male who presents today for wound/ulcer evaluation. History of Wound Context the patient is here for follow-up on left foot nonhealing wound at second toe amputation site with surrounding redness, serosanguineous drainage. The patient also had redness and swelling to the third toe. He denied fever or chills, denied diarrhea, no other complaints. 2022 BUN 40, creatinine 1.91 with GFR of 43 that was 56 on 2022. Tissue culture was obtained on 2022 that grew MRSA and Bacteroides, was started on clindamycin that he stopped taking due to diarrhea. He was prescribed Zyvox and Flagyl 2 weeks ago  He is taking oral Flagyl but could not afford Zyvox. Bactrim was prescribed last week but has not taken it. Left foot CT on 8/15/2022 showed soft tissue ulcer along the dorsum of the forefoot with gas densities, third metatarsal, third proximal phalanx and second distal metatarsal osteomyelitis cannot be excluded. 2022 C-reactive protein 12.7, sedimentation rate 34  C-reactive protein was 36.7 and sedimentation rate was 97 on 2022, blood cultures were negative. Patient had angiogram done 2022 with no intervention.   Wound/Ulcer Pain Timing/Severity: none  The patient had MRSA bacteremia was suspected osteomyelitis to the second toe status post second toe amputation 3/13/2022  S/P bilateral great toe amputation    Ulcer Identification:  Ulcer Type: arterial    Contributing Factors: arterial insufficiency and malnutrition    The patient had AICD in place    PAST MEDICAL HISTORY        Diagnosis Date    Atrial fibrillation ([facility] CAD (coronary artery disease)     Cardiomyopathy (HCC)     Cellulitis     CHF (congestive heart failure) (HCC)     Diabetes mellitus ([facility]     Foot infection     Heart failure (HCC)     Hyperlipidemia     Hypertension     MRSA (methicillin resistant staph aureus) culture positive     Osteomyelitis (HCC)     PVD (peripheral vascular disease) ([facility]     Wound, open, foot     left foot       PAST SURGICAL HISTORY    Past Surgical History:   Procedure Laterality Date    CARDIAC SURGERY  2010    CABG X3    COLONOSCOPY      DEBRIDEMENT Right 11/19/2015    DEBRIDEMENT WOUND FOOT RIGHT W/ APPLICATION BILAT INTEG RAT GRAFT    ENDOSCOPY, COLON, DIAGNOSTIC      EYE SURGERY Bilateral     cataracts    HERNIA REPAIR      umbilical    KNEE ARTHROSCOPY Right     OTHER SURGICAL HISTORY Right 12 29 15    wound care graft procedure foot,appl of integra    PACEMAKER PLACEMENT  2011    WITH DEFIBRILLATOR    TOE AMPUTATION Right     R great    TOE AMPUTATION Left 3/13/2022    TOE AMPUTATION--left 2nd digit performed by Tabitha Hernandez DPM at [address] [place] [address] Left 7/13/2022    RIGHT COMMON FEMORAL ACCESS UNDER ULTRASOUND GUIDANCE DISTAL AORTOGRAPHY WITH PELVIC RUN OFF PLACEMENT OF CATHETER INTO LEFT COMMON FEMORAL ARTERY, PROFUNDA FEMORAL AND SFA  ARTERIOGRAPHY PLACEMENT OF CATHETER INTO LEFT SFA, WITH SFA POPLITEAL AND TIBIAL TIBIOPERONEAL ARTERIOGRAPHY INTO LEFT FOOT performed by Blade Song MD at [address] [place] [address]    Family History   Problem Relation Age of Onset    Cancer Father         pancreatic cancer    Other Brother         MI    Osteoarthritis Mother     Other Maternal Grandfather         MI       SOCIAL HISTORY    Social History     Tobacco Use    Smoking status: Never    Smokeless tobacco: Never   Vaping Use    Vaping Use: Never used   Substance Use Topics    Alcohol use: Not Currently     Comment: SOCIAL on weekends    Drug use: No       ALLERGIES    Allergies   Allergen Reactions Doxycycline Other (See Comments)     Skin peeling    Pcn [Penicillins] Rash    Penicillin G Rash       MEDICATIONS    Current Outpatient Medications on File Prior to Encounter   Medication Sig Dispense Refill    sulfamethoxazole-trimethoprim (BACTRIM DS) 800-160 MG per tablet Take 1 tablet by mouth 2 times daily for 10 days 20 tablet 0    metroNIDAZOLE (FLAGYL) 250 MG tablet Take 250 mg by mouth in the morning and 250 mg at noon and 250 mg before bedtime. sulfamethoxazole-trimethoprim (BACTRIM DS) 800-160 MG per tablet Take 1 tablet by mouth in the morning and 1 tablet before bedtime. Do all this for 14 days. 28 tablet 0    sodium hypochlorite (DAKINS) 0.125 % SOLN external solution Apply to packing strip and pack wound daily 1000 mL 1    aspirin 81 MG EC tablet Take 1 tablet by mouth daily 30 tablet 3    nitroGLYCERIN (NITROSTAT) 0.4 MG SL tablet up to max of 3 total doses. If no relief after 1 dose, call 911. (Patient not taking: Reported on 7/25/2022) 25 tablet 3    glipiZIDE (GLUCOTROL) 5 MG tablet Take 1 tablet by mouth every morning (before breakfast) 60 tablet 3    benazepril (LOTENSIN) 5 MG tablet Take 1 tablet by mouth daily 30 tablet 3    furosemide (LASIX) 80 MG tablet Take 1 tablet by mouth daily 60 tablet 3    warfarin (COUMADIN) 2 MG tablet Take 4 mg by mouth daily Per [facility]      atorvastatin (LIPITOR) 40 MG tablet Take 40 mg by mouth daily. No current facility-administered medications on file prior to encounter. REVIEW OF SYSTEMS  Review of Systems    Pertinent items are noted in HPI.     Objective:      BP (!) 164/82   Pulse 70   Temp (!) 96 °F (35.6 °C) (Tympanic)   Resp 16   Ht 5' 10\" (1.778 m)   Wt 179 lb (81.2 kg)   BMI 25.68 kg/m²     Wt Readings from Last 3 Encounters:   08/22/22 179 lb (81.2 kg)   08/15/22 179 lb (81.2 kg)   08/08/22 179 lb (81.2 kg)       PHYSICAL EXAM  Physical Exam  Constitutional:       General: He is not in acute distress. HENT:      Right Ear: External ear normal.      Left Ear: External ear normal.   Eyes:      General: No scleral icterus. Conjunctiva/sclera: Conjunctivae normal.   Pulmonary:      Effort: Pulmonary effort is normal. No respiratory distress. Abdominal:      General: There is no distension. Palpations: Abdomen is soft. Musculoskeletal:      Cervical back: Neck supple. No rigidity. Skin:     General: Skin is warm. Coloration: Skin is not jaundiced. Neurological:      Mental Status: He is alert. Assessment:        Problem List Items Addressed This Visit       Ulcer of left foot, with necrosis of bone ([address] - Primary    Relevant Orders    Initiate Outpatient Wound Care Protocol        Procedure Note  Indications:  Based on my examination of this patient's wound(s)/ulcer(s) today, debridement is required to promote healing and evaluate the wound base. Performed by: Sherrie Andrea MD    Consent obtained:  Yes    Time out taken:  Yes    Pain Control: Anesthetic  Anesthetic: 4% Lidocaine Liquid Topical       Debridement: Excisional Debridement    Using curette the wound(s)/ulcer(s) was/were debrided down through and including the removal of subcutaneous tissue and muscle/fascia. Devitalized Tissue Debrided:  fibrin, biofilm, and slough    Pre Debridement Measurements:  Are located in the [place]  Documentation Flow Sheet    Diabetic/Pressure/Non Pressure Ulcers only:  Ulcer: N/A     Wound/Ulcer #: 1    Post Debridement Measurements:  Wound/Ulcer Descriptions are Pre Debridement except measurements:    Wound 06/30/22 Toe (Comment  which one) Anterior; Left wound #1 second toe (Active)   Wound Image   07/28/22 0840   Wound Etiology Diabetic Cort[place] 3 08/22/22 6697   Dressing Status New drainage noted; Old drainage noted 08/22/22 0821   Wound Cleansed Soap and water 08/22/22 0821   Dressing/Treatment Other (comment) 08/15/22 0956   Offloading for Diabetic Foot Ulcers Offloading ordered;Post op shoe 08/22/22 0821   Wound Length (cm) 0.8 cm 08/22/22 0821   Wound Width (cm) 0.5 cm 08/22/22 0821   Wound Depth (cm) 2.8 cm 08/22/22 0821   Wound Surface Area (cm^2) 0.4 cm^2 08/22/22 0821   Change in Wound Size % (l*w) -185.71 08/22/22 0821   Wound Volume (cm^3) 1.12 cm^3 08/22/22 0821   Wound Healing % -2567 08/22/22 0821   Post-Procedure Length (cm) 0.8 cm 08/22/22 0821   Post-Procedure Width (cm) 0.5 cm 08/22/22 0821   Post-Procedure Depth (cm) 2.8 cm 08/22/22 0821   Post-Procedure Surface Area (cm^2) 0.4 cm^2 08/22/22 0821   Post-Procedure Volume (cm^3) 1.12 cm^3 08/22/22 0821   Wound Assessment Pink/red;Fibrin 08/22/22 0821   Drainage Amount Moderate 08/22/22 0821   Drainage Description Serosanguinous 08/22/22 0821   Odor None 08/22/22 0821   Keturah-wound Assessment Blanchable erythema 08/22/22 0821   Margins Defined edges 08/22/22 0821   Number of days: 52       Wound 08/22/22 Toe (Comment  which one) Left;Plantar #2 left 2nd toe plantar (Active)   Wound Image   08/22/22 8691   Dressing Status New drainage noted; Old drainage noted 08/22/22 0821   Wound Cleansed Soap and water 08/22/22 0821   Offloading for Diabetic Foot Ulcers Offloading ordered;Post op shoe 08/22/22 0821   Wound Length (cm) 1.4 cm 08/22/22 0821   Wound Width (cm) 2 cm 08/22/22 0821   Wound Depth (cm) 0.4 cm 08/22/22 0821   Wound Surface Area (cm^2) 2.8 cm^2 08/22/22 0821   Wound Volume (cm^3) 1.12 cm^3 08/22/22 0821   Post-Procedure Length (cm) 1.4 cm 08/22/22 0821   Post-Procedure Width (cm) 2 cm 08/22/22 7388   Post-Procedure Depth (cm) 0.4 cm 08/22/22 0821   Post-Procedure Surface Area (cm^2) 2.8 cm^2 08/22/22 0821   Post-Procedure Volume (cm^3) 1.12 cm^3 08/22/22 0821   Wound Assessment Slough;Eschar moist 08/22/22 0821   Drainage Amount Moderate 08/22/22 0821   Drainage Description Serosanguinous 08/22/22 0821   Odor None 08/22/22 0821   Keturah-wound Assessment Blanchable erythema 08/22/22 0821   Margins Undefined edges 08/22/22 0821   Number of days: 0          Total Surface Area Debrided:  0.4 sq cm     Estimated Blood Loss:  Minimal    Hemostasis Achieved:  by pressure    Procedural Pain:  0  / 10     Post Procedural Pain:  0 / 10     Response to treatment:  Well tolerated by patient. Plan:   The patient had an appointment with vascular surgery later today. He was advised to go to the ER after his appointment for IV antibiotics and podiatry evaluation. He will likely need surgical debridement/bone biopsy and possible TMA    Written patient dismissal instructions given to patient and signed by patient or POA. Discharge Instructions         [address] [facility] and [address] [facility]                                 Visit  Discharge Instructions / Physician Orders  [facility]     SUPPLIES ORDERED THRU:    NONE                 DATE LAST SUPPLIED     Wound Location:  Left foot 2nd toe and lateral foot     Cleanse with: Liquid antibacterial soap and water, rinse well      Dressing Orders:   Left foot Primary dressing   Dakins soaked plain 1/4\" packing     Secondary dressing  Dry Dressing       secure with           x 30days   weave dry gauze between toes 3,4 and 5  Frequency:  Change daily   get labs done on Friday   Additional Orders: Increase protein to diet (meat, cheese, eggs, fish, peanut butter, nuts and beans)  Multivitamin daily   start Bactrim Ds 1 tablet 2 x per day (may crush and put in apple sauce) for 2 weeks  Start Flagyl-do not drink alcohol with this medicine it will make you sick Start 8/8/22     OFFLOADING [x] YES  TYPE:                  [] NA  surgical shoe  Weekly wound care visits until determined otherwise.      Antibiotic therapy-wound care related YES [x] NO [] NA[]     MY CHART []     Smart Device  []                         Your next appointment with the [address] [place] [address] is in 2 week with Dr. Elier Brewster 1 week with Dr. Roxanne Jensen                                                                                              (Please note your next appointment above and if you are unable to keep, kindly give a 24 hour notice. Thank you.)  If more than 15 min late we cannot guarantee you will be seen due to clinician schedule  Per Policy, Excessive cancellation will call for dismissal from program.  If you experience any of the following, please call the [facility] during business hours:  [phone]     * Increase in Pain  * Temperature over 101  * Increase in drainage from your wound  * Drainage with a foul odor  * Bleeding  * Increase in swelling  * Need for compression bandage changes due to slippage, breakthrough drainage. If you need medical attention outside of the business hours of the [facility] please contact your PCP or go to the nearest emergency room. The information contained in the After Visit Summary has been reviewed with me, the patient and/or responsible adult, by my health care provider(s). I had the opportunity to ask questions regarding this information.  I have elected to receive;      []After Visit Summary  [x]Comprehensive Discharge Instruction        Patient signature______________________________________Date:________        Electronically signed by Toma Dsouza MD on 8/22/2022 at 8:58 AM Stretcher

## 2022-08-31 NOTE — ED PROVIDER NOTE - PROGRESS NOTE DETAILS
patient KATHERINE [place] - phone number for pt's daughter Maritza - [phone]. JW Pt signed out to me in stable condition.  H/H WNL.  Gross melena on exam.  Pt admitted to internal medicine for endoscopy, no studies in the past. Pt Tx for GIB.  Discussed with hospitalist.  MAR text paged. JW Pt signed out to me in stable condition.  H/H WNL.  No chest pain, SOB, Sx of ACS.  EKG at baseline. Gross melena on exam.  Pt admitted to internal medicine for endoscopy, no studies in the past. Pt Tx for GIB.  Discussed with hospitalist.  MAR text paged.

## 2022-10-06 NOTE — DISCHARGE NOTE ADULT - REASON FOR ADMISSION
Released to [employer] and message from provider/results was viewed by patient. [address] for clinda sent to pharmacy. ray melena- H/H stable- asa stopped.

## 2022-12-05 NOTE — PROGRESS NOTE ADULT - PROBLEM SELECTOR PLAN 6
error - patient had positive UA, UCx growing gram negative rods  - c/w zosyn (day 5) - patient with history of CKD III  - SCr stable, appears at baseline from prior hospitalizations  - avoid nephrotoxins, renally dose meds

## 2023-01-01 NOTE — H&P ADULT - NSHPSOCIALHISTORY_GEN_ALL_CORE
Social History:    Lives with: Lives with daughter and grand daughter who take care of her.    Substance Use (street drugs): (X) never used  (  ) other:  Tobacco Usage:  (X) never smoked   (   ) former smoker   (   ) current smoker  (     ) pack year  (        ) last cigarette date  Alcohol Usage: No alcohol use     (     ) Advanced Directives: (     ) None    (      ) DNR    (     ) DNI    (X) Full code
1

## 2023-07-01 NOTE — DISCHARGE NOTE ADULT - LAUNCH MEDICATION RECONCILIATION
Problem: PHYSICAL THERAPY ADULT  Goal: Performs mobility at highest level of function for planned discharge setting. See evaluation for individualized goals. Description: Treatment/Interventions: Functional transfer training, Therapeutic exercise, Endurance training, Patient/family training, Equipment eval/education, Gait training, Bed mobility, Spoke to nursing, LE strengthening/ROM          See flowsheet documentation for full assessment, interventions and recommendations. 7/1/2023 1317 by Meredith Elena PT  Note: Prognosis: Good  Problem List: Decreased strength, Decreased endurance, Impaired balance, Decreased mobility, Decreased cognition, Decreased safety awareness  Assessment: Pt is 80 y.o. female seen for PT evaluation on 7/1/2023 [facility] w/ Acute CHF (congestive heart failure) ([address] [facility]). PT was consulted to assess pt's functional mobility and d/c needs. Order placed for PT eval and tx. PTA, pt resides with nikorina in [facility], [address] [place] [address] to 2nd floor bedroom/bathroom, ambulates with walker, I with ADLs; pt questionable historian, CM to follow up. At time of eval, pt requiring min A for bed mobility and transfers, mod A For short gait trial from EOB to recliner with use of RW. Upon evaluation, pt presenting with impaired functional mobility d/t decreased strength, decreased endurance, impaired balance, decreased mobility, decreased cognition, decreased safety awareness and activity intolerance. Pertinent PMHx and current co-morbidities affecting pt's physical performance at time of assessment include: acute CHF, stage 3a CKD, A fib, dementia, lumbar radiculopathy, vascular dementia, RLS, ambulatory dysfunction, anxiety, allergic bronchitis. Personal factors affecting pt at time of eval include: lives in 2 story house, ambulating w/ assistive device, inability to navigate community distances and impulsivity.  The following objective measures performed on IE also reveal limitations: Barthel Index: 30/100, Modified Christine: 4 (moderate/severe disability) and AM-PAC 6-Clicks: 93/29. Pt's clinical presentation is currently unstable/unpredictable seen in pt's presentation of advanced age, abnormal lab value(s), need for input for task focus and mobility technique, ongoing medical assessment and on telemetry monitoring. Overall, pt's rehab potential and prognosis to return to PLOF is good as impacted by objective findings, warranting pt to receive further skilled PT interventions to address identified impairments, activity limitation(s), and participation restriction(s). Pt to benefit from continued PT tx to address deficits as defined above and maximize level of functional independent mobility. From PT/mobility standpoint, recommendation at time of d/c would be post acute rehabilitation services pending progress in order to facilitate return to PLOF. Barriers to Discharge: Inaccessible home environment, Decreased caregiver support     PT Discharge Recommendation: Post acute rehabilitation services (vs HHPT)    See flowsheet documentation for full assessment. 7/1/2023 1317 by Helen Barrientos PT  Note: Prognosis: Good  Problem List: Decreased strength, Decreased endurance, Impaired balance, Decreased mobility, Decreased cognition, Decreased safety awareness  Assessment: Pt is 80 y.o. female seen for PT evaluation on 7/1/2023 s/p admit to [address] on 7/1/2023 w/ Acute CHF (congestive heart failure) ([address] [facility]). PT was consulted to assess pt's functional mobility and d/c needs. Order placed for PT eval and tx. PTA, pt resides with niece in [facility], [address] [place] [address] to 2nd floor bedroom/bathroom, ambulates with walker, I with ADLs; pt questionable historian, CM to follow up. At time of eval, pt requiring min A for bed mobility and transfers, mod A For short gait trial from EOB to recliner with use of RW.  Upon evaluation, pt presenting with impaired functional mobility d/t decreased strength, decreased endurance, impaired balance, decreased mobility, decreased cognition, decreased safety awareness and activity intolerance. Pertinent PMHx and current co-morbidities affecting pt's physical performance at time of assessment include: acute CHF, stage 3a CKD, A fib, dementia, lumbar radiculopathy, vascular dementia, RLS, ambulatory dysfunction, anxiety, allergic bronchitis. Personal factors affecting pt at time of eval include: lives in 2 story house, ambulating w/ assistive device, inability to navigate community distances and impulsivity. The following objective measures performed on IE also reveal limitations: Barthel Index: 30/100, Modified Emery: 4 (moderate/severe disability) and AM-PAC 6-Clicks: 78/04. Pt's clinical presentation is currently unstable/unpredictable seen in pt's presentation of advanced age, abnormal lab value(s), need for input for task focus and mobility technique, ongoing medical assessment and on telemetry monitoring. Overall, pt's rehab potential and prognosis to return to PLOF is good as impacted by objective findings, warranting pt to receive further skilled PT interventions to address identified impairments, activity limitation(s), and participation restriction(s). Pt to benefit from continued PT tx to address deficits as defined above and maximize level of functional independent mobility. From PT/mobility standpoint, recommendation at time of d/c would be post acute rehabilitation services pending progress in order to facilitate return to PLOF. Barriers to Discharge: Inaccessible home environment, Decreased caregiver support     PT Discharge Recommendation: Post acute rehabilitation services (vs HHPT)    See flowsheet documentation for full assessment. Large hard red raised area to R groin <<-----Click here for Discharge Medication Review
